# Patient Record
Sex: FEMALE | Race: OTHER | NOT HISPANIC OR LATINO
[De-identification: names, ages, dates, MRNs, and addresses within clinical notes are randomized per-mention and may not be internally consistent; named-entity substitution may affect disease eponyms.]

---

## 2018-03-13 PROBLEM — Z00.00 ENCOUNTER FOR PREVENTIVE HEALTH EXAMINATION: Status: ACTIVE | Noted: 2018-03-13

## 2018-03-22 ENCOUNTER — APPOINTMENT (OUTPATIENT)
Dept: PULMONOLOGY | Facility: CLINIC | Age: 33
End: 2018-03-22
Payer: COMMERCIAL

## 2018-03-22 ENCOUNTER — APPOINTMENT (OUTPATIENT)
Dept: HEART AND VASCULAR | Facility: CLINIC | Age: 33
End: 2018-03-22
Payer: COMMERCIAL

## 2018-03-22 VITALS — SYSTOLIC BLOOD PRESSURE: 158 MMHG | HEART RATE: 102 BPM | DIASTOLIC BLOOD PRESSURE: 100 MMHG

## 2018-03-22 VITALS
BODY MASS INDEX: 46.1 KG/M2 | HEIGHT: 64 IN | WEIGHT: 270 LBS | HEART RATE: 105 BPM | SYSTOLIC BLOOD PRESSURE: 160 MMHG | DIASTOLIC BLOOD PRESSURE: 100 MMHG

## 2018-03-22 DIAGNOSIS — N18.9 CHRONIC KIDNEY DISEASE, UNSPECIFIED: ICD-10-CM

## 2018-03-22 PROCEDURE — 94060 EVALUATION OF WHEEZING: CPT

## 2018-03-22 PROCEDURE — 99205 OFFICE O/P NEW HI 60 MIN: CPT | Mod: 25

## 2018-03-22 PROCEDURE — 93000 ELECTROCARDIOGRAM COMPLETE: CPT

## 2018-03-22 PROCEDURE — 99204 OFFICE O/P NEW MOD 45 MIN: CPT | Mod: 25

## 2018-03-22 PROCEDURE — 36415 COLL VENOUS BLD VENIPUNCTURE: CPT

## 2018-03-22 PROCEDURE — 94729 DIFFUSING CAPACITY: CPT

## 2018-03-22 PROCEDURE — 94727 GAS DIL/WSHOT DETER LNG VOL: CPT

## 2018-03-26 LAB
ALBUMIN SERPL ELPH-MCNC: 3.5 G/DL
ALP BLD-CCNC: 79 U/L
ALT SERPL-CCNC: 18 U/L
ANION GAP SERPL CALC-SCNC: 16 MMOL/L
APPEARANCE: CLEAR
AST SERPL-CCNC: 11 U/L
BACTERIA: NEGATIVE
BASOPHILS # BLD AUTO: 0.01 K/UL
BASOPHILS NFR BLD AUTO: 0.2 %
BILIRUB SERPL-MCNC: <0.2 MG/DL
BILIRUBIN URINE: NEGATIVE
BLOOD URINE: NEGATIVE
BUN SERPL-MCNC: 26 MG/DL
CALCIUM SERPL-MCNC: 8.6 MG/DL
CHLORIDE SERPL-SCNC: 105 MMOL/L
CO2 SERPL-SCNC: 18 MMOL/L
COLOR: YELLOW
CREAT SERPL-MCNC: 2.07 MG/DL
CREAT SPEC-SCNC: 80 MG/DL
CREAT SPEC-SCNC: 80 MG/DL
CREAT/PROT UR: 4.3 RATIO
EOSINOPHIL # BLD AUTO: 0.16 K/UL
EOSINOPHIL NFR BLD AUTO: 2.5 %
GLUCOSE QUALITATIVE U: NEGATIVE MG/DL
GLUCOSE SERPL-MCNC: 84 MG/DL
HCT VFR BLD CALC: 36.2 %
HGB BLD-MCNC: 12.4 G/DL
HYALINE CASTS: 0 /LPF
IMM GRANULOCYTES NFR BLD AUTO: 0.2 %
KETONES URINE: NEGATIVE
LEUKOCYTE ESTERASE URINE: NEGATIVE
LYMPHOCYTES # BLD AUTO: 1.16 K/UL
LYMPHOCYTES NFR BLD AUTO: 18.3 %
MAGNESIUM SERPL-MCNC: 1.9 MG/DL
MAN DIFF?: NORMAL
MCHC RBC-ENTMCNC: 29.3 PG
MCHC RBC-ENTMCNC: 34.3 GM/DL
MCV RBC AUTO: 85.6 FL
MICROALBUMIN 24H UR DL<=1MG/L-MCNC: 215.2 MG/DL
MICROALBUMIN/CREAT 24H UR-RTO: 2690 MG/G
MICROSCOPIC-UA: NORMAL
MONOCYTES # BLD AUTO: 0.63 K/UL
MONOCYTES NFR BLD AUTO: 9.9 %
NEUTROPHILS # BLD AUTO: 4.37 K/UL
NEUTROPHILS NFR BLD AUTO: 68.9 %
NITRITE URINE: NEGATIVE
PH URINE: 6
PLATELET # BLD AUTO: 279 K/UL
POTASSIUM SERPL-SCNC: 4.1 MMOL/L
PROT SERPL-MCNC: 6.6 G/DL
PROT UR-MCNC: 345 MG/DL
PROTEIN URINE: 300 MG/DL
RBC # BLD: 4.23 M/UL
RBC # FLD: 14.2 %
RED BLOOD CELLS URINE: 4 /HPF
SODIUM SERPL-SCNC: 139 MMOL/L
SPECIFIC GRAVITY URINE: 1.01
SQUAMOUS EPITHELIAL CELLS: 6 /HPF
T3FREE SERPL-MCNC: 3.15 PG/ML
T4 FREE SERPL-MCNC: 1.3 NG/DL
TSH SERPL-ACNC: 3.15 UIU/ML
UROBILINOGEN URINE: NEGATIVE MG/DL
WBC # FLD AUTO: 6.34 K/UL
WHITE BLOOD CELLS URINE: 10 /HPF

## 2018-03-27 ENCOUNTER — APPOINTMENT (OUTPATIENT)
Dept: NEPHROLOGY | Facility: CLINIC | Age: 33
End: 2018-03-27
Payer: COMMERCIAL

## 2018-03-27 ENCOUNTER — LABORATORY RESULT (OUTPATIENT)
Age: 33
End: 2018-03-27

## 2018-03-27 VITALS
DIASTOLIC BLOOD PRESSURE: 102 MMHG | OXYGEN SATURATION: 99 % | SYSTOLIC BLOOD PRESSURE: 150 MMHG | HEIGHT: 64 IN | BODY MASS INDEX: 46.1 KG/M2 | RESPIRATION RATE: 12 BRPM | WEIGHT: 270 LBS | HEART RATE: 90 BPM

## 2018-03-27 DIAGNOSIS — E03.9 HYPOTHYROIDISM, UNSPECIFIED: ICD-10-CM

## 2018-03-27 DIAGNOSIS — Z78.9 OTHER SPECIFIED HEALTH STATUS: ICD-10-CM

## 2018-03-27 DIAGNOSIS — Z82.49 FAMILY HISTORY OF ISCHEMIC HEART DISEASE AND OTHER DISEASES OF THE CIRCULATORY SYSTEM: ICD-10-CM

## 2018-03-27 PROCEDURE — 99244 OFF/OP CNSLTJ NEW/EST MOD 40: CPT

## 2018-03-28 LAB
25(OH)D3 SERPL-MCNC: 9 NG/ML
ALBUMIN SERPL ELPH-MCNC: 3.4 G/DL
ALP BLD-CCNC: 73 U/L
ALT SERPL-CCNC: 13 U/L
ANION GAP SERPL CALC-SCNC: 14 MMOL/L
APPEARANCE: CLEAR
APTT BLD: 26.2 SEC
AST SERPL-CCNC: 11 U/L
BACTERIA: NEGATIVE
BASOPHILS # BLD AUTO: 0.01 K/UL
BASOPHILS NFR BLD AUTO: 0.2 %
BILIRUB SERPL-MCNC: <0.2 MG/DL
BILIRUBIN URINE: NEGATIVE
BLOOD URINE: NEGATIVE
BUN SERPL-MCNC: 24 MG/DL
C3 SERPL-MCNC: 129 MG/DL
C4 SERPL-MCNC: 32 MG/DL
CALCIUM SERPL-MCNC: 8.9 MG/DL
CHLORIDE SERPL-SCNC: 106 MMOL/L
CO2 SERPL-SCNC: 19 MMOL/L
COLOR: YELLOW
CREAT SERPL-MCNC: 2.2 MG/DL
CREAT SPEC-SCNC: 74 MG/DL
CREAT/PROT UR: 4.2 RATIO
DSDNA AB SER-ACNC: <12 IU/ML
EOSINOPHIL # BLD AUTO: 0.17 K/UL
EOSINOPHIL NFR BLD AUTO: 2.8 %
GLUCOSE QUALITATIVE U: NEGATIVE MG/DL
GLUCOSE SERPL-MCNC: 87 MG/DL
HBV SURFACE AG SER QL: NONREACTIVE
HCT VFR BLD CALC: 34 %
HCV AB SER QL: NONREACTIVE
HCV S/CO RATIO: 0.06 S/CO
HGB BLD-MCNC: 11.2 G/DL
HYALINE CASTS: 2 /LPF
IGA 24H UR QL IFE: NORMAL
IMM GRANULOCYTES NFR BLD AUTO: 0.2 %
INR PPP: 0.92 RATIO
KETONES URINE: NEGATIVE
LEUKOCYTE ESTERASE URINE: NEGATIVE
LYMPHOCYTES # BLD AUTO: 1.53 K/UL
LYMPHOCYTES NFR BLD AUTO: 24.9 %
M PROTEIN SPEC IFE-MCNC: NORMAL
MAN DIFF?: NORMAL
MCHC RBC-ENTMCNC: 29 PG
MCHC RBC-ENTMCNC: 32.9 GM/DL
MCV RBC AUTO: 88.1 FL
MICROSCOPIC-UA: NORMAL
MONOCYTES # BLD AUTO: 0.4 K/UL
MONOCYTES NFR BLD AUTO: 6.5 %
MPO AB + PR3 PNL SER: NORMAL
NEUTROPHILS # BLD AUTO: 4.02 K/UL
NEUTROPHILS NFR BLD AUTO: 65.4 %
NITRITE URINE: NEGATIVE
PH URINE: 6
PHOSPHATE SERPL-MCNC: 3.2 MG/DL
PLATELET # BLD AUTO: 268 K/UL
POTASSIUM SERPL-SCNC: 4.1 MMOL/L
PROT SERPL-MCNC: 6 G/DL
PROT UR-MCNC: 313 MG/DL
PROTEIN URINE: 300 MG/DL
PT BLD: 10.4 SEC
RBC # BLD: 3.86 M/UL
RBC # FLD: 13.8 %
RED BLOOD CELLS URINE: 5 /HPF
SODIUM SERPL-SCNC: 139 MMOL/L
SPECIFIC GRAVITY URINE: 1.02
SQUAMOUS EPITHELIAL CELLS: 8 /HPF
URATE SERPL-MCNC: 5.8 MG/DL
UROBILINOGEN URINE: NEGATIVE MG/DL
WBC # FLD AUTO: 6.14 K/UL
WHITE BLOOD CELLS URINE: 14 /HPF

## 2018-03-30 LAB
ANA PAT FLD IF-IMP: ABNORMAL
ANA SER IF-ACNC: ABNORMAL

## 2018-04-04 ENCOUNTER — APPOINTMENT (OUTPATIENT)
Dept: NEPHROLOGY | Facility: CLINIC | Age: 33
End: 2018-04-04
Payer: COMMERCIAL

## 2018-04-04 ENCOUNTER — OUTPATIENT (OUTPATIENT)
Dept: OUTPATIENT SERVICES | Facility: HOSPITAL | Age: 33
LOS: 1 days | End: 2018-04-04
Payer: COMMERCIAL

## 2018-04-04 VITALS — SYSTOLIC BLOOD PRESSURE: 142 MMHG | DIASTOLIC BLOOD PRESSURE: 90 MMHG | HEART RATE: 88 BPM

## 2018-04-04 DIAGNOSIS — Z01.818 ENCOUNTER FOR OTHER PREPROCEDURAL EXAMINATION: ICD-10-CM

## 2018-04-04 LAB
BLD GP AB SCN SERPL QL: NEGATIVE — SIGNIFICANT CHANGE UP
PHOSPHOLIPASE A2 RECEPTOR ELISA: <2 RU/ML
PHOSPHOLIPASE A2 RECEPTOR IFA: NEGATIVE
RH IG SCN BLD-IMP: POSITIVE — SIGNIFICANT CHANGE UP

## 2018-04-04 PROCEDURE — 86900 BLOOD TYPING SEROLOGIC ABO: CPT

## 2018-04-04 PROCEDURE — 86901 BLOOD TYPING SEROLOGIC RH(D): CPT

## 2018-04-04 PROCEDURE — 86850 RBC ANTIBODY SCREEN: CPT

## 2018-04-04 PROCEDURE — 99215 OFFICE O/P EST HI 40 MIN: CPT

## 2018-04-05 ENCOUNTER — FORM ENCOUNTER (OUTPATIENT)
Age: 33
End: 2018-04-05

## 2018-04-06 ENCOUNTER — OUTPATIENT (OUTPATIENT)
Dept: OUTPATIENT SERVICES | Facility: HOSPITAL | Age: 33
LOS: 1 days | End: 2018-04-06
Payer: COMMERCIAL

## 2018-04-06 ENCOUNTER — TRANSCRIPTION ENCOUNTER (OUTPATIENT)
Age: 33
End: 2018-04-06

## 2018-04-06 ENCOUNTER — APPOINTMENT (OUTPATIENT)
Dept: ULTRASOUND IMAGING | Facility: HOSPITAL | Age: 33
End: 2018-04-06
Payer: COMMERCIAL

## 2018-04-06 PROCEDURE — 76770 US EXAM ABDO BACK WALL COMP: CPT

## 2018-04-06 PROCEDURE — 76770 US EXAM ABDO BACK WALL COMP: CPT | Mod: 26

## 2018-04-12 ENCOUNTER — RESULT REVIEW (OUTPATIENT)
Age: 33
End: 2018-04-12

## 2018-04-12 ENCOUNTER — OUTPATIENT (OUTPATIENT)
Dept: OUTPATIENT SERVICES | Facility: HOSPITAL | Age: 33
LOS: 1 days | Discharge: HOME | End: 2018-04-12
Payer: COMMERCIAL

## 2018-04-12 VITALS
WEIGHT: 260.15 LBS | DIASTOLIC BLOOD PRESSURE: 77 MMHG | HEART RATE: 102 BPM | TEMPERATURE: 97 F | SYSTOLIC BLOOD PRESSURE: 139 MMHG | RESPIRATION RATE: 16 BRPM | HEIGHT: 64 IN

## 2018-04-12 VITALS — SYSTOLIC BLOOD PRESSURE: 133 MMHG | HEART RATE: 72 BPM | DIASTOLIC BLOOD PRESSURE: 68 MMHG

## 2018-04-12 DIAGNOSIS — Z98.890 OTHER SPECIFIED POSTPROCEDURAL STATES: Chronic | ICD-10-CM

## 2018-04-12 DIAGNOSIS — Z33.2 ENCOUNTER FOR ELECTIVE TERMINATION OF PREGNANCY: Chronic | ICD-10-CM

## 2018-04-12 LAB
BLD GP AB SCN SERPL QL: NEGATIVE — SIGNIFICANT CHANGE UP
HCT VFR BLD CALC: 28.8 % — LOW (ref 34.5–45)
HGB BLD-MCNC: 9.5 G/DL — LOW (ref 11.5–15.5)
MCHC RBC-ENTMCNC: 28.3 PG — SIGNIFICANT CHANGE UP (ref 27–34)
MCHC RBC-ENTMCNC: 33 G/DL — SIGNIFICANT CHANGE UP (ref 32–36)
MCV RBC AUTO: 85.7 FL — SIGNIFICANT CHANGE UP (ref 80–100)
PLATELET # BLD AUTO: 229 K/UL — SIGNIFICANT CHANGE UP (ref 150–400)
RBC # BLD: 3.36 M/UL — LOW (ref 3.8–5.2)
RBC # FLD: 13.7 % — SIGNIFICANT CHANGE UP (ref 10.3–16.9)
RH IG SCN BLD-IMP: POSITIVE — SIGNIFICANT CHANGE UP
WBC # BLD: 11.7 K/UL — HIGH (ref 3.8–10.5)
WBC # FLD AUTO: 11.7 K/UL — HIGH (ref 3.8–10.5)

## 2018-04-12 PROCEDURE — 86850 RBC ANTIBODY SCREEN: CPT

## 2018-04-12 PROCEDURE — 59841 INDUCED ABORTION DILAT&EVAC: CPT

## 2018-04-12 PROCEDURE — 86900 BLOOD TYPING SEROLOGIC ABO: CPT

## 2018-04-12 PROCEDURE — 88305 TISSUE EXAM BY PATHOLOGIST: CPT

## 2018-04-12 PROCEDURE — 86901 BLOOD TYPING SEROLOGIC RH(D): CPT

## 2018-04-12 PROCEDURE — 85027 COMPLETE CBC AUTOMATED: CPT

## 2018-04-12 RX ORDER — OXYCODONE AND ACETAMINOPHEN 5; 325 MG/1; MG/1
2 TABLET ORAL EVERY 6 HOURS
Qty: 0 | Refills: 0 | Status: DISCONTINUED | OUTPATIENT
Start: 2018-04-12 | End: 2018-04-12

## 2018-04-12 RX ORDER — METOCLOPRAMIDE HCL 10 MG
10 TABLET ORAL EVERY 6 HOURS
Qty: 0 | Refills: 0 | Status: DISCONTINUED | OUTPATIENT
Start: 2018-04-12 | End: 2018-04-12

## 2018-04-12 RX ORDER — OXYCODONE AND ACETAMINOPHEN 5; 325 MG/1; MG/1
1 TABLET ORAL EVERY 4 HOURS
Qty: 0 | Refills: 0 | Status: DISCONTINUED | OUTPATIENT
Start: 2018-04-12 | End: 2018-04-12

## 2018-04-12 RX ORDER — ONDANSETRON 8 MG/1
4 TABLET, FILM COATED ORAL ONCE
Qty: 0 | Refills: 0 | Status: DISCONTINUED | OUTPATIENT
Start: 2018-04-12 | End: 2018-04-12

## 2018-04-12 RX ORDER — HYDROMORPHONE HYDROCHLORIDE 2 MG/ML
0.5 INJECTION INTRAMUSCULAR; INTRAVENOUS; SUBCUTANEOUS
Qty: 0 | Refills: 0 | Status: DISCONTINUED | OUTPATIENT
Start: 2018-04-12 | End: 2018-04-12

## 2018-04-12 RX ORDER — SODIUM CHLORIDE 9 MG/ML
1000 INJECTION, SOLUTION INTRAVENOUS
Qty: 0 | Refills: 0 | Status: DISCONTINUED | OUTPATIENT
Start: 2018-04-12 | End: 2018-04-12

## 2018-04-12 NOTE — H&P ADULT - HISTORY OF PRESENT ILLNESS
33 yo  at 13w presents for scheduled termination of pregnancy for maternal medical complications. Patient was newly diagnosed with kidney failure at the time she learned she was pregnant. Patient was referred to nephrology and will continue work up of renal disease after termination, which was recommended by nephrologist.     Obhx: VTOP D&C  C/s in 2016 for chronic HTN, failure to dilate at 37 wks  Gynhx: denies  PMH: HTN, asthma, newly diagnosed renal disease  PSH: C/S, cholecystectomy  Meds: Labetalol 300 BID, synthroid  NKDA  Social: no T/A/D

## 2018-04-12 NOTE — H&P ADULT - ASSESSMENT
33 yo  at 13w by LMP presents for scheduled termination of pregnancy.  -NPO  -IV hydration  -Analgesia prn  -Plan to discharge patient from PACU

## 2018-04-12 NOTE — H&P ADULT - NSHPPHYSICALEXAM_GEN_ALL_CORE
Vital Signs Last 24 Hrs  T(C): 36.2 (12 Apr 2018 09:21), Max: 36.2 (12 Apr 2018 09:21)  T(F): 97.2 (12 Apr 2018 09:21), Max: 97.2 (12 Apr 2018 09:21)  HR: 102 (12 Apr 2018 09:21) (102 - 102)  BP: 139/77 (12 Apr 2018 09:21) (139/77 - 139/77)  BP(mean): --  RR: 16 (12 Apr 2018 09:21) (16 - 16)  SpO2: --    Gen: NAD, AOx3  Chest no distress  Extremities: WWP, no edema

## 2018-04-12 NOTE — PACU DISCHARGE NOTE - COMMENTS
Discharge instructions given to patient who verbalized understanding. Denies pain, nausea and vomiting, scant bleeding noted on peripad after patient ambulated for 5 minutes. VSS, cleared by Anesthesiologist for discharge home.

## 2018-04-18 LAB — SURGICAL PATHOLOGY STUDY: SIGNIFICANT CHANGE UP

## 2018-04-19 ENCOUNTER — APPOINTMENT (OUTPATIENT)
Dept: NEPHROLOGY | Facility: CLINIC | Age: 33
End: 2018-04-19
Payer: COMMERCIAL

## 2018-04-19 ENCOUNTER — APPOINTMENT (OUTPATIENT)
Dept: PULMONOLOGY | Facility: CLINIC | Age: 33
End: 2018-04-19

## 2018-04-19 VITALS
WEIGHT: 260 LBS | SYSTOLIC BLOOD PRESSURE: 138 MMHG | DIASTOLIC BLOOD PRESSURE: 92 MMHG | HEART RATE: 84 BPM | BODY MASS INDEX: 44.63 KG/M2

## 2018-04-19 DIAGNOSIS — O16.9 UNSPECIFIED MATERNAL HYPERTENSION, UNSPECIFIED TRIMESTER: ICD-10-CM

## 2018-04-19 PROBLEM — I10 ESSENTIAL (PRIMARY) HYPERTENSION: Chronic | Status: ACTIVE | Noted: 2018-04-11

## 2018-04-19 PROCEDURE — 99214 OFFICE O/P EST MOD 30 MIN: CPT

## 2018-04-23 LAB
25(OH)D3 SERPL-MCNC: 8.9 NG/ML
ALBUMIN SERPL ELPH-MCNC: 2.9 G/DL
ALBUMIN SERPL ELPH-MCNC: 3.6 G/DL
ALP BLD-CCNC: 66 U/L
ALP BLD-CCNC: 71 U/L
ALT SERPL-CCNC: 15 U/L
ALT SERPL-CCNC: 20 U/L
ANION GAP SERPL CALC-SCNC: 13 MMOL/L
ANION GAP SERPL CALC-SCNC: 13 MMOL/L
APPEARANCE: ABNORMAL
APPEARANCE: CLEAR
APTT BLD: 27 SEC
AST SERPL-CCNC: 11 U/L
AST SERPL-CCNC: 14 U/L
BACTERIA: ABNORMAL
BACTERIA: NEGATIVE
BASOPHILS # BLD AUTO: 0.01 K/UL
BASOPHILS NFR BLD AUTO: 0.2 %
BILIRUB SERPL-MCNC: 0.2 MG/DL
BILIRUB SERPL-MCNC: <0.2 MG/DL
BILIRUBIN URINE: NEGATIVE
BILIRUBIN URINE: NEGATIVE
BLOOD URINE: ABNORMAL
BLOOD URINE: NEGATIVE
BUN SERPL-MCNC: 21 MG/DL
BUN SERPL-MCNC: 26 MG/DL
CALCIUM SERPL-MCNC: 8.5 MG/DL
CALCIUM SERPL-MCNC: 8.9 MG/DL
CHLORIDE SERPL-SCNC: 106 MMOL/L
CHLORIDE SERPL-SCNC: 106 MMOL/L
CO2 SERPL-SCNC: 20 MMOL/L
CO2 SERPL-SCNC: 21 MMOL/L
COLOR: YELLOW
COLOR: YELLOW
CREAT SERPL-MCNC: 1.86 MG/DL
CREAT SERPL-MCNC: 2.36 MG/DL
CREAT SPEC-SCNC: 77 MG/DL
CREAT SPEC-SCNC: 82 MG/DL
CREAT/PROT UR: 2.6 RATIO
CREAT/PROT UR: 3.4 RATIO
EOSINOPHIL # BLD AUTO: 0.36 K/UL
EOSINOPHIL NFR BLD AUTO: 6.8 %
GLUCOSE QUALITATIVE U: NEGATIVE MG/DL
GLUCOSE QUALITATIVE U: NEGATIVE MG/DL
GLUCOSE SERPL-MCNC: 81 MG/DL
GLUCOSE SERPL-MCNC: 95 MG/DL
HCT VFR BLD CALC: 27.8 %
HGB BLD-MCNC: 9.1 G/DL
HYALINE CASTS: 0 /LPF
HYALINE CASTS: 0 /LPF
IMM GRANULOCYTES NFR BLD AUTO: 0.2 %
INR PPP: 0.92 RATIO
KETONES URINE: NEGATIVE
KETONES URINE: NEGATIVE
LEUKOCYTE ESTERASE URINE: ABNORMAL
LEUKOCYTE ESTERASE URINE: NEGATIVE
LYMPHOCYTES # BLD AUTO: 1.07 K/UL
LYMPHOCYTES NFR BLD AUTO: 20.2 %
MAN DIFF?: NORMAL
MCHC RBC-ENTMCNC: 28.9 PG
MCHC RBC-ENTMCNC: 32.7 GM/DL
MCV RBC AUTO: 88.3 FL
MICROSCOPIC-UA: NORMAL
MICROSCOPIC-UA: NORMAL
MONOCYTES # BLD AUTO: 0.34 K/UL
MONOCYTES NFR BLD AUTO: 6.4 %
NEUTROPHILS # BLD AUTO: 3.52 K/UL
NEUTROPHILS NFR BLD AUTO: 66.2 %
NITRITE URINE: NEGATIVE
NITRITE URINE: NEGATIVE
PH URINE: 6
PH URINE: 6.5
PHOSPHATE SERPL-MCNC: 3.1 MG/DL
PLATELET # BLD AUTO: 301 K/UL
POTASSIUM SERPL-SCNC: 4.3 MMOL/L
POTASSIUM SERPL-SCNC: 4.9 MMOL/L
PROT SERPL-MCNC: 5.7 G/DL
PROT SERPL-MCNC: 6.1 G/DL
PROT UR-MCNC: 202 MG/DL
PROT UR-MCNC: 280 MG/DL
PROTEIN URINE: 300 MG/DL
PROTEIN URINE: 300 MG/DL
PT BLD: 10.4 SEC
RBC # BLD: 3.15 M/UL
RBC # FLD: 15.1 %
RED BLOOD CELLS URINE: 10 /HPF
RED BLOOD CELLS URINE: 2 /HPF
SODIUM SERPL-SCNC: 139 MMOL/L
SODIUM SERPL-SCNC: 140 MMOL/L
SPECIFIC GRAVITY URINE: 1.01
SPECIFIC GRAVITY URINE: 1.01
SQUAMOUS EPITHELIAL CELLS: 10 /HPF
SQUAMOUS EPITHELIAL CELLS: 7 /HPF
URATE SERPL-MCNC: 5.7 MG/DL
UROBILINOGEN URINE: NEGATIVE MG/DL
UROBILINOGEN URINE: NEGATIVE MG/DL
WBC # FLD AUTO: 5.31 K/UL
WHITE BLOOD CELLS URINE: 107 /HPF
WHITE BLOOD CELLS URINE: 14 /HPF

## 2018-04-24 ENCOUNTER — OTHER (OUTPATIENT)
Age: 33
End: 2018-04-24

## 2018-04-30 ENCOUNTER — FORM ENCOUNTER (OUTPATIENT)
Age: 33
End: 2018-04-30

## 2018-05-01 ENCOUNTER — OUTPATIENT (OUTPATIENT)
Dept: OUTPATIENT SERVICES | Facility: HOSPITAL | Age: 33
LOS: 1 days | End: 2018-05-01
Payer: COMMERCIAL

## 2018-05-01 ENCOUNTER — RESULT REVIEW (OUTPATIENT)
Age: 33
End: 2018-05-01

## 2018-05-01 ENCOUNTER — APPOINTMENT (OUTPATIENT)
Dept: CT IMAGING | Facility: HOSPITAL | Age: 33
End: 2018-05-01
Payer: COMMERCIAL

## 2018-05-01 ENCOUNTER — APPOINTMENT (OUTPATIENT)
Dept: INTERVENTIONAL RADIOLOGY/VASCULAR | Facility: HOSPITAL | Age: 33
End: 2018-05-01
Payer: COMMERCIAL

## 2018-05-01 DIAGNOSIS — Z98.890 OTHER SPECIFIED POSTPROCEDURAL STATES: Chronic | ICD-10-CM

## 2018-05-01 DIAGNOSIS — Z33.2 ENCOUNTER FOR ELECTIVE TERMINATION OF PREGNANCY: Chronic | ICD-10-CM

## 2018-05-01 DIAGNOSIS — D30.02 BENIGN NEOPLASM OF LEFT KIDNEY: ICD-10-CM

## 2018-05-01 DIAGNOSIS — N28.9 DISORDER OF KIDNEY AND URETER, UNSPECIFIED: ICD-10-CM

## 2018-05-01 LAB
ALBUMIN SERPL ELPH-MCNC: 4 G/DL
ALP BLD-CCNC: 82 U/L
ALT SERPL-CCNC: 25 U/L
ANION GAP SERPL CALC-SCNC: 15 MMOL/L
APPEARANCE: ABNORMAL
AST SERPL-CCNC: 16 U/L
BACTERIA: ABNORMAL
BILIRUB SERPL-MCNC: 0.2 MG/DL
BILIRUBIN URINE: NEGATIVE
BLOOD URINE: NEGATIVE
BUN SERPL-MCNC: 22 MG/DL
CALCIUM SERPL-MCNC: 8.7 MG/DL
CHLORIDE SERPL-SCNC: 108 MMOL/L
CO2 SERPL-SCNC: 19 MMOL/L
COLOR: YELLOW
CREAT SERPL-MCNC: 2.57 MG/DL
CREAT SPEC-SCNC: 127 MG/DL
CREAT/PROT UR: 2.2 RATIO
GLUCOSE QUALITATIVE U: NEGATIVE MG/DL
GLUCOSE SERPL-MCNC: 89 MG/DL
HYALINE CASTS: 5 /LPF
KETONES URINE: NEGATIVE
LEUKOCYTE ESTERASE URINE: ABNORMAL
MICROSCOPIC-UA: NORMAL
NITRITE URINE: NEGATIVE
PH URINE: 5.5
POTASSIUM SERPL-SCNC: 4.3 MMOL/L
PROT SERPL-MCNC: 6.6 G/DL
PROT UR-MCNC: 280 MG/DL
PROTEIN URINE: 300 MG/DL
RED BLOOD CELLS URINE: 0 /HPF
SODIUM SERPL-SCNC: 142 MMOL/L
SPECIFIC GRAVITY URINE: 1.01
SQUAMOUS EPITHELIAL CELLS: 6 /HPF
UROBILINOGEN URINE: NEGATIVE MG/DL
WHITE BLOOD CELLS URINE: 72 /HPF

## 2018-05-01 PROCEDURE — 88313 SPECIAL STAINS GROUP 2: CPT

## 2018-05-01 PROCEDURE — 88348 ELECTRON MICROSCOPY DX: CPT

## 2018-05-01 PROCEDURE — 76942 ECHO GUIDE FOR BIOPSY: CPT | Mod: 26

## 2018-05-01 PROCEDURE — 88346 IMFLUOR 1ST 1ANTB STAIN PX: CPT | Mod: 26

## 2018-05-01 PROCEDURE — 99153 MOD SED SAME PHYS/QHP EA: CPT

## 2018-05-01 PROCEDURE — 99152 MOD SED SAME PHYS/QHP 5/>YRS: CPT

## 2018-05-01 PROCEDURE — 76942 ECHO GUIDE FOR BIOPSY: CPT

## 2018-05-01 PROCEDURE — 88346 IMFLUOR 1ST 1ANTB STAIN PX: CPT

## 2018-05-01 PROCEDURE — 88312 SPECIAL STAINS GROUP 1: CPT | Mod: 26

## 2018-05-01 PROCEDURE — 88350 IMFLUOR EA ADDL 1ANTB STN PX: CPT | Mod: 26

## 2018-05-01 PROCEDURE — 50200 RENAL BIOPSY PERQ: CPT | Mod: LT

## 2018-05-01 PROCEDURE — 88305 TISSUE EXAM BY PATHOLOGIST: CPT | Mod: 26

## 2018-05-01 PROCEDURE — 50200 RENAL BIOPSY PERQ: CPT

## 2018-05-01 PROCEDURE — 88313 SPECIAL STAINS GROUP 2: CPT | Mod: 26

## 2018-05-01 PROCEDURE — 88350 IMFLUOR EA ADDL 1ANTB STN PX: CPT

## 2018-05-01 PROCEDURE — 88305 TISSUE EXAM BY PATHOLOGIST: CPT

## 2018-05-01 PROCEDURE — 88312 SPECIAL STAINS GROUP 1: CPT

## 2018-05-01 PROCEDURE — 88348 ELECTRON MICROSCOPY DX: CPT | Mod: 26

## 2018-05-18 ENCOUNTER — APPOINTMENT (OUTPATIENT)
Dept: NEPHROLOGY | Facility: CLINIC | Age: 33
End: 2018-05-18
Payer: COMMERCIAL

## 2018-05-18 VITALS
WEIGHT: 260 LBS | BODY MASS INDEX: 44.63 KG/M2 | HEART RATE: 84 BPM | SYSTOLIC BLOOD PRESSURE: 134 MMHG | DIASTOLIC BLOOD PRESSURE: 78 MMHG

## 2018-05-18 DIAGNOSIS — O10.919 UNSPECIFIED PRE-EXISTING HYPERTENSION COMPLICATING PREGNANCY, UNSPECIFIED TRIMESTER: ICD-10-CM

## 2018-05-18 PROCEDURE — 99215 OFFICE O/P EST HI 40 MIN: CPT

## 2018-05-18 RX ORDER — LABETALOL HYDROCHLORIDE 200 MG/1
200 TABLET, FILM COATED ORAL
Qty: 180 | Refills: 3 | Status: DISCONTINUED | COMMUNITY
Start: 2018-03-22 | End: 2018-05-18

## 2018-06-25 ENCOUNTER — APPOINTMENT (OUTPATIENT)
Dept: NEPHROLOGY | Facility: CLINIC | Age: 33
End: 2018-06-25

## 2018-10-17 ENCOUNTER — APPOINTMENT (OUTPATIENT)
Dept: BARIATRICS | Facility: CLINIC | Age: 33
End: 2018-10-17
Payer: COMMERCIAL

## 2018-10-17 VITALS
SYSTOLIC BLOOD PRESSURE: 142 MMHG | WEIGHT: 253 LBS | DIASTOLIC BLOOD PRESSURE: 85 MMHG | BODY MASS INDEX: 43.19 KG/M2 | TEMPERATURE: 98.3 F | HEART RATE: 96 BPM | HEIGHT: 64 IN | OXYGEN SATURATION: 97 %

## 2018-10-17 DIAGNOSIS — J45.909 UNSPECIFIED ASTHMA, UNCOMPLICATED: ICD-10-CM

## 2018-10-17 PROCEDURE — 99203 OFFICE O/P NEW LOW 30 MIN: CPT

## 2018-10-24 ENCOUNTER — APPOINTMENT (OUTPATIENT)
Dept: BARIATRICS | Facility: CLINIC | Age: 33
End: 2018-10-24

## 2018-10-29 ENCOUNTER — APPOINTMENT (OUTPATIENT)
Dept: BARIATRICS | Facility: CLINIC | Age: 33
End: 2018-10-29

## 2019-01-08 ENCOUNTER — APPOINTMENT (OUTPATIENT)
Dept: NEPHROLOGY | Facility: CLINIC | Age: 34
End: 2019-01-08
Payer: COMMERCIAL

## 2019-01-08 VITALS
WEIGHT: 259 LBS | SYSTOLIC BLOOD PRESSURE: 140 MMHG | DIASTOLIC BLOOD PRESSURE: 108 MMHG | HEART RATE: 92 BPM | BODY MASS INDEX: 44.46 KG/M2

## 2019-01-08 LAB
APPEARANCE: CLEAR
BACTERIA: NEGATIVE
BASOPHILS # BLD AUTO: 0.03 K/UL
BASOPHILS NFR BLD AUTO: 0.5 %
BILIRUBIN URINE: NEGATIVE
BLOOD URINE: NEGATIVE
COLOR: YELLOW
EOSINOPHIL # BLD AUTO: 0.57 K/UL
EOSINOPHIL NFR BLD AUTO: 10.1 %
GLUCOSE QUALITATIVE U: NEGATIVE MG/DL
HCT VFR BLD CALC: 38 %
HGB BLD-MCNC: 12.3 G/DL
HYALINE CASTS: 1 /LPF
IMM GRANULOCYTES NFR BLD AUTO: 0 %
KETONES URINE: NEGATIVE
LEUKOCYTE ESTERASE URINE: NEGATIVE
LYMPHOCYTES # BLD AUTO: 1.45 K/UL
LYMPHOCYTES NFR BLD AUTO: 25.7 %
MAN DIFF?: NORMAL
MCHC RBC-ENTMCNC: 28.8 PG
MCHC RBC-ENTMCNC: 32.4 GM/DL
MCV RBC AUTO: 89 FL
MICROSCOPIC-UA: NORMAL
MONOCYTES # BLD AUTO: 0.37 K/UL
MONOCYTES NFR BLD AUTO: 6.6 %
NEUTROPHILS # BLD AUTO: 3.22 K/UL
NEUTROPHILS NFR BLD AUTO: 57.1 %
NITRITE URINE: NEGATIVE
PH URINE: 6
PLATELET # BLD AUTO: 299 K/UL
PROTEIN URINE: 100 MG/DL
RBC # BLD: 4.27 M/UL
RBC # FLD: 14.1 %
RED BLOOD CELLS URINE: 3 /HPF
SPECIFIC GRAVITY URINE: 1.02
SQUAMOUS EPITHELIAL CELLS: 5 /HPF
UROBILINOGEN URINE: NEGATIVE MG/DL
WBC # FLD AUTO: 5.64 K/UL
WHITE BLOOD CELLS URINE: 3 /HPF

## 2019-01-08 PROCEDURE — 99214 OFFICE O/P EST MOD 30 MIN: CPT

## 2019-01-09 NOTE — PHYSICAL EXAM
[General Appearance - Alert] : alert [General Appearance - In No Acute Distress] : in no acute distress [Sclera] : the sclera and conjunctiva were normal [Jugular Venous Distention Increased] : there was no jugular-venous distention [Auscultation Breath Sounds / Voice Sounds] : lungs were clear to auscultation bilaterally [Heart Rate And Rhythm] : heart rate was normal and rhythm regular [Heart Sounds Gallop] : no gallops [Murmurs] : no murmurs [Heart Sounds Pericardial Friction Rub] : no pericardial rub [Edema] : there was no peripheral edema [Abdomen Soft] : soft [Abdomen Tenderness] : non-tender [No CVA Tenderness] : no ~M costovertebral angle tenderness [Involuntary Movements] : no involuntary movements were seen [] : no rash [No Focal Deficits] : no focal deficits [Oriented To Time, Place, And Person] : oriented to person, place, and time [Affect] : the affect was normal [FreeTextEntry1] : obese

## 2019-01-09 NOTE — ASSESSMENT
[FreeTextEntry1] : CKD 4  with severe obesity, HTN and secondary FSGS-- stable renal fxn as of Oct \par lytes acceptable \par BP still seems high though reports much better at home\par obesity - evaluated for bariatric surgery\par will check labs today -- eso chem, phos, PTH, vit D, urine protein/cr\par advised follow home BP -- and will touch base - consider add ca channel blocker - or increase ACEI if proteinuria increased and K and cr stable\par refill vit D based on level\par weight loss encouraged - aree with plan for bariatric sx \par f/u 1-2 mos-- emphasized importance close f/u \par \par

## 2019-01-09 NOTE — HISTORY OF PRESENT ILLNESS
[FreeTextEntry1] : f/u CKD, FSGS, HTN\par has been well \par had recent episode gross hematuria with some dysuria (few days ago) -- hasn’t recurred but has had frequency for a while\par BP at home has been good when chekcs -- 120/70s\par taking meds \par no labs since\par got evaluation for bariatric surgery\par PCP Dr West - at Dr Burrell

## 2019-01-10 ENCOUNTER — RX RENEWAL (OUTPATIENT)
Age: 34
End: 2019-01-10

## 2019-01-21 LAB
25(OH)D3 SERPL-MCNC: 22.2 NG/ML
ALBUMIN SERPL ELPH-MCNC: 4 G/DL
ALP BLD-CCNC: 78 U/L
ALT SERPL-CCNC: 20 U/L
ANION GAP SERPL CALC-SCNC: 11 MMOL/L
AST SERPL-CCNC: 13 U/L
BACTERIA UR CULT: NORMAL
BILIRUB SERPL-MCNC: 0.2 MG/DL
BUN SERPL-MCNC: 29 MG/DL
CALCIUM SERPL-MCNC: 9 MG/DL
CALCIUM SERPL-MCNC: 9 MG/DL
CHLORIDE SERPL-SCNC: 110 MMOL/L
CO2 SERPL-SCNC: 20 MMOL/L
CREAT SERPL-MCNC: 2.16 MG/DL
CREAT SPEC-SCNC: 93 MG/DL
CREAT/PROT UR: 1 RATIO
GLUCOSE SERPL-MCNC: 98 MG/DL
MAGNESIUM SERPL-MCNC: 2 MG/DL
PARATHYROID HORMONE INTACT: 185 PG/ML
PHOSPHATE SERPL-MCNC: 3.6 MG/DL
POTASSIUM SERPL-SCNC: 4.7 MMOL/L
PROT SERPL-MCNC: 6.9 G/DL
PROT UR-MCNC: 89 MG/DL
SODIUM SERPL-SCNC: 141 MMOL/L
URATE SERPL-MCNC: 7.3 MG/DL

## 2019-03-29 VITALS — WEIGHT: 251.25 LBS | BODY MASS INDEX: 42.89 KG/M2 | HEIGHT: 64 IN

## 2019-04-10 ENCOUNTER — RX RENEWAL (OUTPATIENT)
Age: 34
End: 2019-04-10

## 2019-04-11 ENCOUNTER — TRANSCRIPTION ENCOUNTER (OUTPATIENT)
Age: 34
End: 2019-04-11

## 2019-05-01 VITALS — HEIGHT: 64 IN | WEIGHT: 256 LBS | BODY MASS INDEX: 43.71 KG/M2

## 2019-05-23 VITALS — HEIGHT: 64 IN | WEIGHT: 256.5 LBS | BODY MASS INDEX: 43.79 KG/M2

## 2019-06-04 ENCOUNTER — APPOINTMENT (OUTPATIENT)
Dept: NEPHROLOGY | Facility: CLINIC | Age: 34
End: 2019-06-04

## 2019-06-27 VITALS — WEIGHT: 251.25 LBS | HEIGHT: 64 IN | BODY MASS INDEX: 42.89 KG/M2

## 2019-09-13 ENCOUNTER — APPOINTMENT (OUTPATIENT)
Dept: NEPHROLOGY | Facility: CLINIC | Age: 34
End: 2019-09-13
Payer: COMMERCIAL

## 2019-09-13 VITALS
SYSTOLIC BLOOD PRESSURE: 144 MMHG | BODY MASS INDEX: 44.97 KG/M2 | WEIGHT: 262 LBS | DIASTOLIC BLOOD PRESSURE: 98 MMHG | HEART RATE: 96 BPM

## 2019-09-13 DIAGNOSIS — E66.9 OBESITY, UNSPECIFIED: ICD-10-CM

## 2019-09-13 DIAGNOSIS — N17.9 ACUTE KIDNEY FAILURE, UNSPECIFIED: ICD-10-CM

## 2019-09-13 PROCEDURE — 99214 OFFICE O/P EST MOD 30 MIN: CPT

## 2019-09-13 RX ORDER — ERGOCALCIFEROL 1.25 MG/1
1.25 MG CAPSULE, LIQUID FILLED ORAL
Qty: 4 | Refills: 3 | Status: DISCONTINUED | COMMUNITY
Start: 2018-04-19 | End: 2019-09-13

## 2019-09-13 RX ORDER — PNV/FERROUS SULFATE/FOLIC ACID 27-<0.5MG
TABLET ORAL
Refills: 0 | Status: DISCONTINUED | COMMUNITY
End: 2019-09-13

## 2019-09-13 RX ORDER — BUDESONIDE AND FORMOTEROL FUMARATE DIHYDRATE 80; 4.5 UG/1; UG/1
80-4.5 AEROSOL RESPIRATORY (INHALATION)
Refills: 0 | Status: ACTIVE | COMMUNITY
Start: 2019-09-13

## 2019-09-13 RX ORDER — LABETALOL HYDROCHLORIDE 300 MG/1
300 TABLET, FILM COATED ORAL
Qty: 60 | Refills: 5 | Status: DISCONTINUED | COMMUNITY
Start: 2018-04-04 | End: 2019-09-13

## 2019-09-13 RX ORDER — LEVOTHYROXINE SODIUM 0.03 MG/1
25 TABLET ORAL DAILY
Refills: 0 | Status: DISCONTINUED | COMMUNITY
Start: 2018-03-16 | End: 2019-09-13

## 2019-09-13 NOTE — HISTORY OF PRESENT ILLNESS
[FreeTextEntry1] : f/u CKD, HTN, FSGS\par last here 1/2019\par BP meds changed by PCP since then because BP was high -- didn’t seem to help much but now much better BP since started new job! previous job was very stressful\par BP was abt 110 systolic at last visit with PCP she thinks  but generally about 130-140 systolic \par feels well - energy good\par no swelling\par scheduled for bariatric surgery this month (9/23) with Dr Childress-- sleeve gastrectomy \par initially lost weight but gained it back \par PCP Dr Hayes \par

## 2019-09-13 NOTE — PHYSICAL EXAM
[General Appearance - Alert] : alert [General Appearance - In No Acute Distress] : in no acute distress [Sclera] : the sclera and conjunctiva were normal [Jugular Venous Distention Increased] : there was no jugular-venous distention [Tachycardic ___] : the heart rate was tachycardic at [unfilled] bpm [Heart Sounds Gallop] : no gallops [Regular] : the rhythm was regular [Edema] : there was no peripheral edema [Abdomen Soft] : soft [Abdomen Tenderness] : non-tender [No CVA Tenderness] : no ~M costovertebral angle tenderness [Involuntary Movements] : no involuntary movements were seen [] : no rash [No Focal Deficits] : no focal deficits [Oriented To Time, Place, And Person] : oriented to person, place, and time [FreeTextEntry1] : obese

## 2019-09-13 NOTE — ASSESSMENT
[FreeTextEntry1] : CKD 3/4 with FSGS -- likely obesity and /or HTN related\par creat slightly up from baseline \par BP seems may be suboptimally controlled still - though reports has been better generally , tachycardia\par morbid obesity\par \par will try restart beta blocker - says tolerated labetalol well previously but willl use toprol XL with tachycardia and also less likely to interact with asthma\par check labs today - consider lower ACEI if creat higher (or hyperkalemia) \par overall if f/u chem stable seems stable from renal viewpoint for OR \par hopefully, significant weight loss will aid BP and renal parameters \par

## 2019-09-17 ENCOUNTER — FORM ENCOUNTER (OUTPATIENT)
Age: 34
End: 2019-09-17

## 2019-09-17 LAB
25(OH)D3 SERPL-MCNC: 14 NG/ML
ALBUMIN SERPL ELPH-MCNC: 4 G/DL
ALP BLD-CCNC: 81 U/L
ALT SERPL-CCNC: 18 U/L
ANION GAP SERPL CALC-SCNC: 11 MMOL/L
APPEARANCE: CLEAR
AST SERPL-CCNC: 15 U/L
BACTERIA: NEGATIVE
BILIRUB SERPL-MCNC: <0.2 MG/DL
BILIRUBIN URINE: NEGATIVE
BLOOD URINE: NEGATIVE
BUN SERPL-MCNC: 26 MG/DL
CALCIUM SERPL-MCNC: 9.1 MG/DL
CALCIUM SERPL-MCNC: 9.1 MG/DL
CHLORIDE SERPL-SCNC: 111 MMOL/L
CO2 SERPL-SCNC: 22 MMOL/L
COLOR: NORMAL
CREAT SERPL-MCNC: 2.98 MG/DL
CREAT SPEC-SCNC: 81 MG/DL
CREAT/PROT UR: 0.9 RATIO
GLUCOSE QUALITATIVE U: NEGATIVE
GLUCOSE SERPL-MCNC: 87 MG/DL
HYALINE CASTS: 0 /LPF
KETONES URINE: NEGATIVE
LEUKOCYTE ESTERASE URINE: NEGATIVE
MAGNESIUM SERPL-MCNC: 2 MG/DL
MICROSCOPIC-UA: NORMAL
NITRITE URINE: NEGATIVE
PARATHYROID HORMONE INTACT: 221 PG/ML
PH URINE: 6.5
PHOSPHATE SERPL-MCNC: 3 MG/DL
POTASSIUM SERPL-SCNC: 5.4 MMOL/L
PROT SERPL-MCNC: 6.6 G/DL
PROT UR-MCNC: 73 MG/DL
PROTEIN URINE: ABNORMAL
RED BLOOD CELLS URINE: 2 /HPF
SODIUM SERPL-SCNC: 143 MMOL/L
SPECIFIC GRAVITY URINE: 1.01
SQUAMOUS EPITHELIAL CELLS: 2 /HPF
URATE SERPL-MCNC: 7.4 MG/DL
UROBILINOGEN URINE: NORMAL
WHITE BLOOD CELLS URINE: 1 /HPF

## 2019-09-18 ENCOUNTER — APPOINTMENT (OUTPATIENT)
Dept: BARIATRICS | Facility: CLINIC | Age: 34
End: 2019-09-18
Payer: COMMERCIAL

## 2019-09-18 ENCOUNTER — APPOINTMENT (OUTPATIENT)
Dept: RADIOLOGY | Facility: HOSPITAL | Age: 34
End: 2019-09-18
Payer: COMMERCIAL

## 2019-09-18 ENCOUNTER — OUTPATIENT (OUTPATIENT)
Dept: OUTPATIENT SERVICES | Facility: HOSPITAL | Age: 34
LOS: 1 days | End: 2019-09-18
Payer: COMMERCIAL

## 2019-09-18 VITALS
BODY MASS INDEX: 44.41 KG/M2 | SYSTOLIC BLOOD PRESSURE: 139 MMHG | DIASTOLIC BLOOD PRESSURE: 95 MMHG | TEMPERATURE: 98.6 F | WEIGHT: 260.13 LBS | HEIGHT: 64 IN | HEART RATE: 109 BPM | OXYGEN SATURATION: 97 %

## 2019-09-18 DIAGNOSIS — Z33.2 ENCOUNTER FOR ELECTIVE TERMINATION OF PREGNANCY: Chronic | ICD-10-CM

## 2019-09-18 DIAGNOSIS — Z98.890 OTHER SPECIFIED POSTPROCEDURAL STATES: Chronic | ICD-10-CM

## 2019-09-18 PROCEDURE — 74241: CPT | Mod: 26

## 2019-09-18 PROCEDURE — 74241: CPT

## 2019-09-18 PROCEDURE — 99024 POSTOP FOLLOW-UP VISIT: CPT

## 2019-09-20 ENCOUNTER — OTHER (OUTPATIENT)
Age: 34
End: 2019-09-20

## 2019-09-20 VITALS
OXYGEN SATURATION: 98 % | RESPIRATION RATE: 20 BRPM | DIASTOLIC BLOOD PRESSURE: 86 MMHG | SYSTOLIC BLOOD PRESSURE: 152 MMHG | HEIGHT: 64 IN | TEMPERATURE: 99 F | WEIGHT: 262.13 LBS | HEART RATE: 98 BPM

## 2019-09-20 RX ORDER — INFLUENZA VIRUS VACCINE 15; 15; 15; 15 UG/.5ML; UG/.5ML; UG/.5ML; UG/.5ML
0.5 SUSPENSION INTRAMUSCULAR ONCE
Refills: 0 | Status: DISCONTINUED | OUTPATIENT
Start: 2019-09-23 | End: 2019-09-25

## 2019-09-20 RX ORDER — AMLODIPINE BESYLATE 2.5 MG/1
1 TABLET ORAL
Qty: 0 | Refills: 0 | DISCHARGE

## 2019-09-20 RX ORDER — LABETALOL HCL 100 MG
1 TABLET ORAL
Qty: 0 | Refills: 0 | DISCHARGE

## 2019-09-20 RX ORDER — OMEPRAZOLE 10 MG/1
1 CAPSULE, DELAYED RELEASE ORAL
Qty: 0 | Refills: 0 | DISCHARGE

## 2019-09-23 ENCOUNTER — RESULT REVIEW (OUTPATIENT)
Age: 34
End: 2019-09-23

## 2019-09-23 ENCOUNTER — INPATIENT (INPATIENT)
Facility: HOSPITAL | Age: 34
LOS: 1 days | Discharge: HOME | DRG: 620 | End: 2019-09-25
Attending: SURGERY | Admitting: SURGERY
Payer: COMMERCIAL

## 2019-09-23 ENCOUNTER — APPOINTMENT (OUTPATIENT)
Dept: BARIATRICS | Facility: HOSPITAL | Age: 34
End: 2019-09-23

## 2019-09-23 DIAGNOSIS — Z98.890 OTHER SPECIFIED POSTPROCEDURAL STATES: Chronic | ICD-10-CM

## 2019-09-23 DIAGNOSIS — Z98.891 HISTORY OF UTERINE SCAR FROM PREVIOUS SURGERY: Chronic | ICD-10-CM

## 2019-09-23 PROCEDURE — 43775 LAP SLEEVE GASTRECTOMY: CPT | Mod: GC

## 2019-09-23 RX ORDER — ONDANSETRON 8 MG/1
4 TABLET, FILM COATED ORAL ONCE
Refills: 0 | Status: DISCONTINUED | OUTPATIENT
Start: 2019-09-23 | End: 2019-09-25

## 2019-09-23 RX ORDER — ERGOCALCIFEROL 1.25 MG/1
1 CAPSULE ORAL
Qty: 0 | Refills: 0 | DISCHARGE

## 2019-09-23 RX ORDER — HYDROMORPHONE HYDROCHLORIDE 2 MG/ML
0.5 INJECTION INTRAMUSCULAR; INTRAVENOUS; SUBCUTANEOUS EVERY 6 HOURS
Refills: 0 | Status: DISCONTINUED | OUTPATIENT
Start: 2019-09-23 | End: 2019-09-24

## 2019-09-23 RX ORDER — ALBUTEROL 90 UG/1
2 AEROSOL, METERED ORAL
Refills: 0 | Status: DISCONTINUED | OUTPATIENT
Start: 2019-09-23 | End: 2019-09-25

## 2019-09-23 RX ORDER — PANTOPRAZOLE SODIUM 20 MG/1
40 TABLET, DELAYED RELEASE ORAL DAILY
Refills: 0 | Status: DISCONTINUED | OUTPATIENT
Start: 2019-09-23 | End: 2019-09-25

## 2019-09-23 RX ORDER — BUDESONIDE AND FORMOTEROL FUMARATE DIHYDRATE 160; 4.5 UG/1; UG/1
2 AEROSOL RESPIRATORY (INHALATION)
Refills: 0 | Status: DISCONTINUED | OUTPATIENT
Start: 2019-09-23 | End: 2019-09-25

## 2019-09-23 RX ORDER — HEPARIN SODIUM 5000 [USP'U]/ML
5000 INJECTION INTRAVENOUS; SUBCUTANEOUS ONCE
Refills: 0 | Status: COMPLETED | OUTPATIENT
Start: 2019-09-23 | End: 2019-09-23

## 2019-09-23 RX ORDER — ACETAMINOPHEN 500 MG
1000 TABLET ORAL ONCE
Refills: 0 | Status: COMPLETED | OUTPATIENT
Start: 2019-09-23 | End: 2019-09-23

## 2019-09-23 RX ORDER — GABAPENTIN 400 MG/1
300 CAPSULE ORAL ONCE
Refills: 0 | Status: COMPLETED | OUTPATIENT
Start: 2019-09-23 | End: 2019-09-23

## 2019-09-23 RX ORDER — HEPARIN SODIUM 5000 [USP'U]/ML
7500 INJECTION INTRAVENOUS; SUBCUTANEOUS EVERY 8 HOURS
Refills: 0 | Status: DISCONTINUED | OUTPATIENT
Start: 2019-09-24 | End: 2019-09-25

## 2019-09-23 RX ORDER — SCOPALAMINE 1 MG/3D
1 PATCH, EXTENDED RELEASE TRANSDERMAL ONCE
Refills: 0 | Status: COMPLETED | OUTPATIENT
Start: 2019-09-23 | End: 2019-09-23

## 2019-09-23 RX ORDER — HYDROMORPHONE HYDROCHLORIDE 2 MG/ML
0.5 INJECTION INTRAMUSCULAR; INTRAVENOUS; SUBCUTANEOUS ONCE
Refills: 0 | Status: DISCONTINUED | OUTPATIENT
Start: 2019-09-23 | End: 2019-09-24

## 2019-09-23 RX ORDER — AMLODIPINE BESYLATE 2.5 MG/1
1 TABLET ORAL
Qty: 0 | Refills: 0 | DISCHARGE

## 2019-09-23 RX ORDER — METOPROLOL TARTRATE 50 MG
25 TABLET ORAL DAILY
Refills: 0 | Status: DISCONTINUED | OUTPATIENT
Start: 2019-09-24 | End: 2019-09-24

## 2019-09-23 RX ORDER — APREPITANT 80 MG/1
40 CAPSULE ORAL ONCE
Refills: 0 | Status: COMPLETED | OUTPATIENT
Start: 2019-09-23 | End: 2019-09-23

## 2019-09-23 RX ORDER — SODIUM CHLORIDE 9 MG/ML
1000 INJECTION, SOLUTION INTRAVENOUS
Refills: 0 | Status: DISCONTINUED | OUTPATIENT
Start: 2019-09-23 | End: 2019-09-24

## 2019-09-23 RX ORDER — ACETAMINOPHEN 500 MG
975 TABLET ORAL ONCE
Refills: 0 | Status: DISCONTINUED | OUTPATIENT
Start: 2019-09-23 | End: 2019-09-23

## 2019-09-23 RX ADMIN — HYDROMORPHONE HYDROCHLORIDE 0.5 MILLIGRAM(S): 2 INJECTION INTRAMUSCULAR; INTRAVENOUS; SUBCUTANEOUS at 21:16

## 2019-09-23 RX ADMIN — Medication 1000 MILLIGRAM(S): at 13:34

## 2019-09-23 RX ADMIN — HYDROMORPHONE HYDROCHLORIDE 0.5 MILLIGRAM(S): 2 INJECTION INTRAMUSCULAR; INTRAVENOUS; SUBCUTANEOUS at 21:55

## 2019-09-23 RX ADMIN — PANTOPRAZOLE SODIUM 40 MILLIGRAM(S): 20 TABLET, DELAYED RELEASE ORAL at 21:09

## 2019-09-23 RX ADMIN — GABAPENTIN 300 MILLIGRAM(S): 400 CAPSULE ORAL at 13:35

## 2019-09-23 RX ADMIN — SCOPALAMINE 1 PATCH: 1 PATCH, EXTENDED RELEASE TRANSDERMAL at 21:25

## 2019-09-23 RX ADMIN — SCOPALAMINE 1 PATCH: 1 PATCH, EXTENDED RELEASE TRANSDERMAL at 13:35

## 2019-09-23 RX ADMIN — APREPITANT 40 MILLIGRAM(S): 80 CAPSULE ORAL at 13:38

## 2019-09-23 RX ADMIN — HEPARIN SODIUM 5000 UNIT(S): 5000 INJECTION INTRAVENOUS; SUBCUTANEOUS at 13:35

## 2019-09-23 RX ADMIN — SODIUM CHLORIDE 150 MILLILITER(S): 9 INJECTION, SOLUTION INTRAVENOUS at 21:24

## 2019-09-23 NOTE — H&P ADULT - ASSESSMENT
Pt is a 34 y/o F w/ PMH of HTN, asthma, stage 4 kidney disease, HTN nephropathy (Cr. 2.92) presents today for robotic assisted lap sleeve gastrectomy.   - OR  - admit to team 2

## 2019-09-23 NOTE — BRIEF OPERATIVE NOTE - OPERATION/FINDINGS
Intrabadominal access via Veress needle. Trochars placed under direct visualization. Omentectomy, Stomach divided along 38Fr Bougie edge using 6 staple loads (45mm x 1, 60mm x 5). Omentopexy. Hemostasis achieved. Fascia at 12mm post site closed. Port sites x 4 closed w/ 4-0 Monocryl sutures & surgical glue.

## 2019-09-23 NOTE — H&P ADULT - HISTORY OF PRESENT ILLNESS
Pt is a 34 y/o F w/ PMH of HTN, asthma, stage 4 kidney disease, HTN nephropathy (Cr. 2.92) presents today for robotic assisted lap sleeve gastrectomy.

## 2019-09-23 NOTE — H&P ADULT - NSHPPHYSICALEXAM_GEN_ALL_CORE
Neuro: Alert and Oriented X 4  Respiratory: CTA b/l. no respiratory distress  Cardiovascular: NSR, RRR  Gastrointestinal: soft, NT/ND  Extremities: (-) edema b/l

## 2019-09-23 NOTE — PROGRESS NOTE ADULT - SUBJECTIVE AND OBJECTIVE BOX
POST-OPERATIVE NOTE    Procedure: Robot-assisted sleeve gastrectomy     Diagnosis/Indication: morbid obesity    Surgeon: Dr. Childress    S: Pt has no complaints. Denies CP, SOB, PIMENTEL, calf tenderness. Pain controlled with medication. Denies nausea, vomiting.    O:  T(C): 37 (09-23-19 @ 20:31), Max: 37 (09-23-19 @ 20:31)  T(F): 98.6 (09-23-19 @ 20:31), Max: 98.6 (09-23-19 @ 20:31)  HR: 100 (09-23-19 @ 22:46) (90 - 100)  BP: 153/81 (09-23-19 @ 22:46) (132/79 - 155/81)  RR: 18 (09-23-19 @ 22:46) (14 - 21)  SpO2: 98% (09-23-19 @ 22:46) (97% - 100%)  Wt(kg): --    Gen: NAD, resting comfortably in bed  C/V: NSR  Pulm: Nonlabored breathing, no respiratory distress  Abd: soft, NT/ND, incisional areas are clean, dry and intact  Extrem: WWP, no calf edema or tenderness, SCDs in place    A/P: 34y Female s/p above procedure  Diet: BCLD  IVF: LR @ 150cc/hr  Pain/nausea control  SQH/SCDs/OOBA/IS  Dispo pending pain control, PO tolerance, clinical improvement

## 2019-09-24 PROBLEM — E66.01 MORBID (SEVERE) OBESITY DUE TO EXCESS CALORIES: Chronic | Status: ACTIVE | Noted: 2019-09-20

## 2019-09-24 PROBLEM — N28.9 DISORDER OF KIDNEY AND URETER, UNSPECIFIED: Chronic | Status: ACTIVE | Noted: 2019-09-20

## 2019-09-24 LAB
ANION GAP SERPL CALC-SCNC: 13 MMOL/L — SIGNIFICANT CHANGE UP (ref 5–17)
BUN SERPL-MCNC: 32 MG/DL — HIGH (ref 7–23)
CALCIUM SERPL-MCNC: 9.2 MG/DL — SIGNIFICANT CHANGE UP (ref 8.4–10.5)
CHLORIDE SERPL-SCNC: 110 MMOL/L — HIGH (ref 96–108)
CO2 SERPL-SCNC: 17 MMOL/L — LOW (ref 22–31)
CREAT SERPL-MCNC: 3.13 MG/DL — HIGH (ref 0.5–1.3)
GLUCOSE SERPL-MCNC: 120 MG/DL — HIGH (ref 70–99)
HCT VFR BLD CALC: 34.7 % — SIGNIFICANT CHANGE UP (ref 34.5–45)
HCT VFR BLD CALC: 36.5 % — SIGNIFICANT CHANGE UP (ref 34.5–45)
HGB BLD-MCNC: 11.4 G/DL — LOW (ref 11.5–15.5)
HGB BLD-MCNC: 11.8 G/DL — SIGNIFICANT CHANGE UP (ref 11.5–15.5)
MAGNESIUM SERPL-MCNC: 1.8 MG/DL — SIGNIFICANT CHANGE UP (ref 1.6–2.6)
MCHC RBC-ENTMCNC: 29.4 PG — SIGNIFICANT CHANGE UP (ref 27–34)
MCHC RBC-ENTMCNC: 29.5 PG — SIGNIFICANT CHANGE UP (ref 27–34)
MCHC RBC-ENTMCNC: 32.3 GM/DL — SIGNIFICANT CHANGE UP (ref 32–36)
MCHC RBC-ENTMCNC: 32.9 GM/DL — SIGNIFICANT CHANGE UP (ref 32–36)
MCV RBC AUTO: 89.7 FL — SIGNIFICANT CHANGE UP (ref 80–100)
MCV RBC AUTO: 90.8 FL — SIGNIFICANT CHANGE UP (ref 80–100)
NRBC # BLD: 0 /100 WBCS — SIGNIFICANT CHANGE UP (ref 0–0)
NRBC # BLD: 0 /100 WBCS — SIGNIFICANT CHANGE UP (ref 0–0)
PHOSPHATE SERPL-MCNC: 3.7 MG/DL — SIGNIFICANT CHANGE UP (ref 2.5–4.5)
PLATELET # BLD AUTO: 225 K/UL — SIGNIFICANT CHANGE UP (ref 150–400)
PLATELET # BLD AUTO: 242 K/UL — SIGNIFICANT CHANGE UP (ref 150–400)
POTASSIUM SERPL-MCNC: 4.9 MMOL/L — SIGNIFICANT CHANGE UP (ref 3.5–5.3)
POTASSIUM SERPL-SCNC: 4.9 MMOL/L — SIGNIFICANT CHANGE UP (ref 3.5–5.3)
RBC # BLD: 3.87 M/UL — SIGNIFICANT CHANGE UP (ref 3.8–5.2)
RBC # BLD: 4.02 M/UL — SIGNIFICANT CHANGE UP (ref 3.8–5.2)
RBC # FLD: 13.2 % — SIGNIFICANT CHANGE UP (ref 10.3–14.5)
RBC # FLD: 13.2 % — SIGNIFICANT CHANGE UP (ref 10.3–14.5)
SODIUM SERPL-SCNC: 140 MMOL/L — SIGNIFICANT CHANGE UP (ref 135–145)
WBC # BLD: 7.84 K/UL — SIGNIFICANT CHANGE UP (ref 3.8–10.5)
WBC # BLD: 7.99 K/UL — SIGNIFICANT CHANGE UP (ref 3.8–10.5)
WBC # FLD AUTO: 7.84 K/UL — SIGNIFICANT CHANGE UP (ref 3.8–10.5)
WBC # FLD AUTO: 7.99 K/UL — SIGNIFICANT CHANGE UP (ref 3.8–10.5)

## 2019-09-24 PROCEDURE — 99222 1ST HOSP IP/OBS MODERATE 55: CPT

## 2019-09-24 RX ORDER — SODIUM CHLORIDE 9 MG/ML
1000 INJECTION, SOLUTION INTRAVENOUS
Refills: 0 | Status: DISCONTINUED | OUTPATIENT
Start: 2019-09-24 | End: 2019-09-24

## 2019-09-24 RX ORDER — AMLODIPINE BESYLATE 2.5 MG/1
10 TABLET ORAL DAILY
Refills: 0 | Status: DISCONTINUED | OUTPATIENT
Start: 2019-09-24 | End: 2019-09-25

## 2019-09-24 RX ORDER — SODIUM CHLORIDE 9 MG/ML
1000 INJECTION, SOLUTION INTRAVENOUS
Refills: 0 | Status: DISCONTINUED | OUTPATIENT
Start: 2019-09-24 | End: 2019-09-25

## 2019-09-24 RX ORDER — METOPROLOL TARTRATE 50 MG
25 TABLET ORAL DAILY
Refills: 0 | Status: DISCONTINUED | OUTPATIENT
Start: 2019-09-24 | End: 2019-09-25

## 2019-09-24 RX ORDER — ACETAMINOPHEN 500 MG
1000 TABLET ORAL ONCE
Refills: 0 | Status: COMPLETED | OUTPATIENT
Start: 2019-09-24 | End: 2019-09-24

## 2019-09-24 RX ADMIN — Medication 400 MILLIGRAM(S): at 11:50

## 2019-09-24 RX ADMIN — HYDROMORPHONE HYDROCHLORIDE 0.5 MILLIGRAM(S): 2 INJECTION INTRAMUSCULAR; INTRAVENOUS; SUBCUTANEOUS at 05:35

## 2019-09-24 RX ADMIN — Medication 1000 MILLIGRAM(S): at 12:35

## 2019-09-24 RX ADMIN — HEPARIN SODIUM 7500 UNIT(S): 5000 INJECTION INTRAVENOUS; SUBCUTANEOUS at 05:13

## 2019-09-24 RX ADMIN — SCOPALAMINE 1 PATCH: 1 PATCH, EXTENDED RELEASE TRANSDERMAL at 06:26

## 2019-09-24 RX ADMIN — BUDESONIDE AND FORMOTEROL FUMARATE DIHYDRATE 2 PUFF(S): 160; 4.5 AEROSOL RESPIRATORY (INHALATION) at 07:20

## 2019-09-24 RX ADMIN — HYDROMORPHONE HYDROCHLORIDE 0.5 MILLIGRAM(S): 2 INJECTION INTRAMUSCULAR; INTRAVENOUS; SUBCUTANEOUS at 01:05

## 2019-09-24 RX ADMIN — HEPARIN SODIUM 7500 UNIT(S): 5000 INJECTION INTRAVENOUS; SUBCUTANEOUS at 20:42

## 2019-09-24 RX ADMIN — BUDESONIDE AND FORMOTEROL FUMARATE DIHYDRATE 2 PUFF(S): 160; 4.5 AEROSOL RESPIRATORY (INHALATION) at 17:32

## 2019-09-24 RX ADMIN — Medication 25 MILLIGRAM(S): at 07:20

## 2019-09-24 RX ADMIN — HEPARIN SODIUM 7500 UNIT(S): 5000 INJECTION INTRAVENOUS; SUBCUTANEOUS at 13:22

## 2019-09-24 RX ADMIN — SCOPALAMINE 1 PATCH: 1 PATCH, EXTENDED RELEASE TRANSDERMAL at 17:33

## 2019-09-24 RX ADMIN — PANTOPRAZOLE SODIUM 40 MILLIGRAM(S): 20 TABLET, DELAYED RELEASE ORAL at 11:50

## 2019-09-24 RX ADMIN — SODIUM CHLORIDE 100 MILLILITER(S): 9 INJECTION, SOLUTION INTRAVENOUS at 08:55

## 2019-09-24 RX ADMIN — AMLODIPINE BESYLATE 10 MILLIGRAM(S): 2.5 TABLET ORAL at 15:49

## 2019-09-24 RX ADMIN — SODIUM CHLORIDE 50 MILLILITER(S): 9 INJECTION, SOLUTION INTRAVENOUS at 17:32

## 2019-09-24 RX ADMIN — HYDROMORPHONE HYDROCHLORIDE 0.5 MILLIGRAM(S): 2 INJECTION INTRAMUSCULAR; INTRAVENOUS; SUBCUTANEOUS at 05:14

## 2019-09-24 NOTE — PROGRESS NOTE ADULT - SUBJECTIVE AND OBJECTIVE BOX
SUBJECTIVE: Patient seen & examined at bedside by chief resident. Patient is doing well this morning. Pain is minimal and under control with current regimen. Tolerating bCLD with no N/V. No flatus, no BM. Denies experiencing any palpitations, chest pain, dyspnea or light-headedness. Has not been out of bed but will try to ambulate this AM. Voiding w/o issues.    Vital Signs Last 24 Hrs  T(C): 36.9 (24 Sep 2019 05:02), Max: 37.3 (24 Sep 2019 01:56)  T(F): 98.4 (24 Sep 2019 05:02), Max: 99.2 (24 Sep 2019 01:56)  HR: 115 (24 Sep 2019 05:02) (90 - 115)  BP: 145/93 (24 Sep 2019 05:02) (132/79 - 156/77)  BP(mean): 113 (24 Sep 2019 00:46) (105 - 117)  RR: 17 (24 Sep 2019 05:02) (14 - 21)  SpO2: 94% (24 Sep 2019 05:02) (94% - 100%)    Gen: NAD  C/V: NSR  Pulm: Nonlabored breathing, no respiratory distress  Abd: soft, ATTP, ND, incision C/D/I.   Extrem: Nonedematous, SCD in place    I&O's Detail    23 Sep 2019 07:01  -  24 Sep 2019 07:00  --------------------------------------------------------  IN:    lactated ringers.: 750 mL  Total IN: 750 mL    OUT:    Voided: 350 mL  Total OUT: 350 mL    Total NET: 400 mL

## 2019-09-24 NOTE — CONSULT NOTE ADULT - SUBJECTIVE AND OBJECTIVE BOX
Patient is a 34y Female hx obesity, HTN, CKD due to secondary FSGS , baseline creatinine mid 2s-- recently increase to high 2s and ACEI lowered. now d#1 post op sleeve gastrectomy. No complaints except mild abd pain post op    PAST MEDICAL & SURGICAL HISTORY:  Nephropathy  Morbid obesity  HTN (hypertension)  Asthma  H/O:  section  History of cholecystectomy      MEDICATIONS  (STANDING):  buDESOnide  80 MICROgram(s)/formoterol 4.5 MICROgram(s) Inhaler 2 Puff(s) Inhalation two times a day  heparin  Injectable 7500 Unit(s) SubCutaneous every 8 hours  influenza   Vaccine 0.5 milliLiter(s) IntraMuscular once  lactated ringers. 1000 milliLiter(s) (100 mL/Hr) IV Continuous <Continuous>  metoprolol succinate ER 25 milliGRAM(s) Oral daily  pantoprazole  Injectable 40 milliGRAM(s) IV Push daily    MEDICATIONS  (PRN):  ALBUTerol    90 MICROgram(s) HFA Inhaler 2 Puff(s) Inhalation four times a day PRN Wheezing  HYDROmorphone  Injectable 0.5 milliGRAM(s) IV Push every 6 hours PRN Severe Pain (7 - 10)  ondansetron Injectable 4 milliGRAM(s) IV Push once PRN Nausea and/or Vomiting      Allergies    No Known Allergies    Intolerances        SOCIAL HISTORY: no tobacco, no drugs    FAMILY HISTORY: NC      T(C): , Max: 37.3 (19 @ 01:56)  T(F): , Max: 99.2 (19 @ 01:56)  HR: 102 (19 @ 08:53)  BP: 138/91 (19 @ 08:53)  BP(mean): 113 (19 @ 00:46)  RR: 18 (19 @ 08:53)  SpO2: 99% (19 @ 08:53)  Wt(kg): --     @ 07:01  -   @ 07:00  --------------------------------------------------------  IN: 750 mL / OUT: 350 mL / NET: 400 mL    09-24 @ 07: @ 12:50  --------------------------------------------------------  IN: 360 mL / OUT: 200 mL / NET: 160 mL            PHYSICAL EXAM:  Constitutional: Well appearing.  No acute distress  ENMT: Moist mucous membrane.  No cyanosis.  Neck: No JVD.  Respiratory: Clear to auscultation   Cardiovascular: S1, S2.  Regular rate and rhythm.    Gastrointestinal: soft, mild tenderness  Extremities: Warm.  No lower extremity edema.    Neurological: No focal deficits.  Skin: Warm. Dry.  .    Psychiatric: Normal affect.    ACCESS:       LABS:                        11.8   7.99  )-----------( 242      ( 24 Sep 2019 08:38 )             36.5         140  |  110<H>  |  32<H>  ----------------------------<  120<H>  4.9   |  17<L>  |  3.13<H>    Ca    9.2      24 Sep 2019 08:38  Phos  3.7       Mg     1.8                       RADIOLOGY & ADDITIONAL STUDIES:

## 2019-09-24 NOTE — DIETITIAN INITIAL EVALUATION ADULT. - OTHER INFO
34 F PMH of HTN, asthma, CKD Stage 4 not on HD (baseline Cr 2.92), PSH lap cholecystectomy, now s/p elective robotic assisted lap sleeve gastrectomy on 9/23. Pt seen resting in bed. Currently on a BARICLLIQ diet and tolerating. Pt denies N/V. +pain at incisional site. Pt prepared with protein shakes and vitamin supplements. RD provided in depth diet education on diet advancement and specific nutrient needs s/p LSG. Pt appeared receptive and expressed understanding. NKFA or dietary restrictions. Skin: Archie score 18. Pt endorses burping, but no BM or flatus since surgery. RD to monitor and f/u per protocol.

## 2019-09-24 NOTE — CONSULT NOTE ADULT - ASSESSMENT
CKD due to FSGS (secondary to obesity)-- appears to have had some progresssion recently  would dc ACEI   cont toprol CKD due to FSGS (secondary to obesity)-- appears to have had some progresssion recently  would dc ACEI   cont toprol   should also be on amlodipine 10 mg/d  can dc IVF when eating   follow chem daily

## 2019-09-24 NOTE — DIETITIAN INITIAL EVALUATION ADULT. - ENERGY NEEDS
Ht (9/23): 162.6cm, Wt (9/23): 118.9kg, IBW: 54.5kg +/-10%, %IBW: 218%, BMI: 45   IBW used to calculate energy needs due to pt's current body weight exceeding 120% of IBW.  Above energy needs calculated for wt maintenance (20-25kcal/kg).   Weeks 1-2 estimated needs: 545-654 kcal/day (10-12kcal/kg), 65-82 g pro/day (1.2-1.5g/kg), >/=64oz clear fluids.

## 2019-09-25 ENCOUNTER — TRANSCRIPTION ENCOUNTER (OUTPATIENT)
Age: 34
End: 2019-09-25

## 2019-09-25 VITALS
HEART RATE: 76 BPM | RESPIRATION RATE: 18 BRPM | SYSTOLIC BLOOD PRESSURE: 132 MMHG | TEMPERATURE: 98 F | DIASTOLIC BLOOD PRESSURE: 82 MMHG | OXYGEN SATURATION: 99 %

## 2019-09-25 LAB
ANION GAP SERPL CALC-SCNC: 12 MMOL/L — SIGNIFICANT CHANGE UP (ref 5–17)
BUN SERPL-MCNC: 29 MG/DL — HIGH (ref 7–23)
CALCIUM SERPL-MCNC: 8.6 MG/DL — SIGNIFICANT CHANGE UP (ref 8.4–10.5)
CHLORIDE SERPL-SCNC: 109 MMOL/L — HIGH (ref 96–108)
CO2 SERPL-SCNC: 18 MMOL/L — LOW (ref 22–31)
CREAT SERPL-MCNC: 3.11 MG/DL — HIGH (ref 0.5–1.3)
GLUCOSE SERPL-MCNC: 84 MG/DL — SIGNIFICANT CHANGE UP (ref 70–99)
HCT VFR BLD CALC: 31.8 % — LOW (ref 34.5–45)
HGB BLD-MCNC: 10.3 G/DL — LOW (ref 11.5–15.5)
MAGNESIUM SERPL-MCNC: 1.7 MG/DL — SIGNIFICANT CHANGE UP (ref 1.6–2.6)
MCHC RBC-ENTMCNC: 29.5 PG — SIGNIFICANT CHANGE UP (ref 27–34)
MCHC RBC-ENTMCNC: 32.4 GM/DL — SIGNIFICANT CHANGE UP (ref 32–36)
MCV RBC AUTO: 91.1 FL — SIGNIFICANT CHANGE UP (ref 80–100)
NRBC # BLD: 0 /100 WBCS — SIGNIFICANT CHANGE UP (ref 0–0)
PHOSPHATE SERPL-MCNC: 3.1 MG/DL — SIGNIFICANT CHANGE UP (ref 2.5–4.5)
PLATELET # BLD AUTO: 193 K/UL — SIGNIFICANT CHANGE UP (ref 150–400)
POTASSIUM SERPL-MCNC: 4 MMOL/L — SIGNIFICANT CHANGE UP (ref 3.5–5.3)
POTASSIUM SERPL-SCNC: 4 MMOL/L — SIGNIFICANT CHANGE UP (ref 3.5–5.3)
RBC # BLD: 3.49 M/UL — LOW (ref 3.8–5.2)
RBC # FLD: 13.2 % — SIGNIFICANT CHANGE UP (ref 10.3–14.5)
SODIUM SERPL-SCNC: 139 MMOL/L — SIGNIFICANT CHANGE UP (ref 135–145)
WBC # BLD: 9.08 K/UL — SIGNIFICANT CHANGE UP (ref 3.8–10.5)
WBC # FLD AUTO: 9.08 K/UL — SIGNIFICANT CHANGE UP (ref 3.8–10.5)

## 2019-09-25 PROCEDURE — 99231 SBSQ HOSP IP/OBS SF/LOW 25: CPT | Mod: GC

## 2019-09-25 RX ORDER — OXYCODONE AND ACETAMINOPHEN 5; 325 MG/1; MG/1
1 TABLET ORAL
Qty: 16 | Refills: 0
Start: 2019-09-25 | End: 2019-09-28

## 2019-09-25 RX ORDER — HYDROMORPHONE HYDROCHLORIDE 2 MG/ML
0.5 INJECTION INTRAMUSCULAR; INTRAVENOUS; SUBCUTANEOUS EVERY 6 HOURS
Refills: 0 | Status: DISCONTINUED | OUTPATIENT
Start: 2019-09-25 | End: 2019-09-25

## 2019-09-25 RX ORDER — MAGNESIUM SULFATE 500 MG/ML
2 VIAL (ML) INJECTION ONCE
Refills: 0 | Status: COMPLETED | OUTPATIENT
Start: 2019-09-25 | End: 2019-09-25

## 2019-09-25 RX ORDER — OMEPRAZOLE 10 MG/1
1 CAPSULE, DELAYED RELEASE ORAL
Qty: 30 | Refills: 0
Start: 2019-09-25 | End: 2019-10-24

## 2019-09-25 RX ORDER — LISINOPRIL 2.5 MG/1
1 TABLET ORAL
Qty: 0 | Refills: 0 | DISCHARGE

## 2019-09-25 RX ORDER — ACETAMINOPHEN 500 MG
650 TABLET ORAL EVERY 6 HOURS
Refills: 0 | Status: DISCONTINUED | OUTPATIENT
Start: 2019-09-25 | End: 2019-09-25

## 2019-09-25 RX ADMIN — BUDESONIDE AND FORMOTEROL FUMARATE DIHYDRATE 2 PUFF(S): 160; 4.5 AEROSOL RESPIRATORY (INHALATION) at 05:17

## 2019-09-25 RX ADMIN — Medication 25 MILLIGRAM(S): at 05:17

## 2019-09-25 RX ADMIN — HEPARIN SODIUM 7500 UNIT(S): 5000 INJECTION INTRAVENOUS; SUBCUTANEOUS at 14:37

## 2019-09-25 RX ADMIN — SCOPALAMINE 1 PATCH: 1 PATCH, EXTENDED RELEASE TRANSDERMAL at 06:30

## 2019-09-25 RX ADMIN — PANTOPRAZOLE SODIUM 40 MILLIGRAM(S): 20 TABLET, DELAYED RELEASE ORAL at 14:38

## 2019-09-25 RX ADMIN — Medication 650 MILLIGRAM(S): at 06:39

## 2019-09-25 RX ADMIN — Medication 650 MILLIGRAM(S): at 05:42

## 2019-09-25 RX ADMIN — Medication 50 GRAM(S): at 09:57

## 2019-09-25 RX ADMIN — AMLODIPINE BESYLATE 10 MILLIGRAM(S): 2.5 TABLET ORAL at 05:17

## 2019-09-25 RX ADMIN — HEPARIN SODIUM 7500 UNIT(S): 5000 INJECTION INTRAVENOUS; SUBCUTANEOUS at 05:17

## 2019-09-25 NOTE — DISCHARGE NOTE PROVIDER - CARE PROVIDER_API CALL
Israel Childress (MD)  Surgery  [address]  [place], [place] [address]  Phone: [phone]  Fax: [phone]  Follow Up Time: 1 week

## 2019-09-25 NOTE — DISCHARGE NOTE PROVIDER - NSDCFUSCHEDAPPT_GEN_ALL_CORE_FT
OCTAVIA VALDIVIA ; 10/03/2019 ; [facility]PP Surg Bariatric [address]  OCTAVIA VALDIVIA ; 12/09/2019 ; NPP Nephro [address]

## 2019-09-25 NOTE — PROGRESS NOTE ADULT - SUBJECTIVE AND OBJECTIVE BOX
OVERNIGHT EVENTS:  pain controlled, tolerating  BCLD, encouraged pt to ambulate and use IS, VSS   9/24: Dr. Neves called Cr 3.13 rec restart Norvasc 10 mg and Toprol xl , do not restart ace inhibitor lisinopril when tolerating BCLD, minimal PO intake , TACHY 102, UO     STATUS POST:  Robotic assisted laparoscopic sleeve gastrectomy     POST OPERATIVE DAY #: 2    SUBJECTIVE:  Patient examined bedside with chief resident. Patient reports pain improved and tolerating diet. Patient denies nausea, vomiting, chest pain and SOB. Patient making adequate urine output.      amLODIPine   Tablet 10 milliGRAM(s) Oral daily  heparin  Injectable 7500 Unit(s) SubCutaneous every 8 hours  metoprolol succinate ER 25 milliGRAM(s) Oral daily      Vital Signs Last 24 Hrs  T(C): 36.9 (25 Sep 2019 08:48), Max: 36.9 (24 Sep 2019 17:29)  T(F): 98.5 (25 Sep 2019 08:48), Max: 98.5 (25 Sep 2019 08:48)  HR: 88 (25 Sep 2019 08:48) (66 - 89)  BP: 129/86 (25 Sep 2019 08:48) (129/86 - 148/104)  BP(mean): --  RR: 15 (25 Sep 2019 08:48) (15 - 17)  SpO2: 96% (25 Sep 2019 08:48) (96% - 100%)  I&O's Detail    24 Sep 2019 07:01  -  25 Sep 2019 07:00  --------------------------------------------------------  IN:    lactated ringers.: 800 mL    lactated ringers.: 650 mL    Oral Fluid: 1320 mL  Total IN: 2770 mL    OUT:    Voided: 2450 mL  Total OUT: 2450 mL    Total NET: 320 mL          General: NAD, resting comfortably in bed  C/V: NSR  Pulm: Nonlabored breathing, no respiratory distress  Abd: Soft , non-distended , TTP around incision site , incision clean dry and intact   Extrem: WWP, no edema, SCDs in place        LABS:                        10.3   9.08  )-----------( 193      ( 25 Sep 2019 06:52 )             31.8     09-25    139  |  109<H>  |  29<H>  ----------------------------<  84  4.0   |  18<L>  |  3.11<H>    Ca    8.6      25 Sep 2019 06:52  Phos  3.1     09-25  Mg     1.7     09-25            RADIOLOGY & ADDITIONAL STUDIES:

## 2019-09-25 NOTE — PROGRESS NOTE ADULT - ASSESSMENT
34 F PMH of HTN, asthma, CKD Stage 4 not on HD (baseline Cr 2.92), PSH lap cholecystectomy s/p elective robotic assisted lap sleeve gastrectomy (9/23)    Pain/nausea control  BCLD, IVF  Protonix  HSQ DVT ppx  SCDs, OOBA, IS  AM labs
34 F PMH of HTN, asthma, CKD Stage 4 not on HD (baseline Cr 2.92), PSH lap cholecystectomy s/p elective robotic assisted lap sleeve gastrectomy POD1    Pain/nausea control  BCLD, IVF  Protonix  SQH  SLY Stone, IS  AM labs  Nutritional consult in AM
CKD seems may have progressed but stable past week  BP acceptable control  keep off lisinopril for now   ok to dc from renal viewpoint with outpt f/u of chem

## 2019-09-25 NOTE — DISCHARGE NOTE NURSING/CASE MANAGEMENT/SOCIAL WORK - PATIENT PORTAL LINK FT
You can access the FollowMyHealth Patient Portal offered by [facility] [place] [facility] by registering at the following website: [email or web address]. By joining [employer]’s FollowMyHealth portal, you will also be able to view your health information using other applications (apps) compatible with our system.

## 2019-09-25 NOTE — DISCHARGE NOTE PROVIDER - HOSPITAL COURSE
34 F PMH of HTN, asthma, CKD Stage 4 not on HD (baseline Cr 2.92), PSH lap paloma and , presents today elective bariatric diet. : robotic assisted lap sleeve gastrectomy. Pt tolerated the procedure well. Patient outpatient nephrologist consulted for Cr. At time of discharge, pt was tolerating a bariatric clear liquid diet, and pt's pain was controlled. Plan is to follow up with Dr. Childress in the office.

## 2019-09-25 NOTE — DISCHARGE NOTE PROVIDER - NSDCCPGOAL_GEN_ALL_CORE_FT
To get better and follow your care plan as instructed.    1) Please take Percocet 1 to 2 tabs by mouth every 4 to 6 hours as needed for severe pain control.  2) Please take Tylenol 650 mg every 4 to 6 hours by mouth for moderate pain control.   3) Please take Omeprazole 40 mg once a day by mouth.  4) Please make an appointment to follow up with Nephrologist .  5) Please continue all hypertensive medications accept  Ace Inhibitor Lisinopril .

## 2019-09-25 NOTE — DISCHARGE NOTE PROVIDER - NSDCCPCAREPLAN_GEN_ALL_CORE_FT
PRINCIPAL DISCHARGE DIAGNOSIS  Diagnosis: Morbid obesity  Assessment and Plan of Treatment: 34 F PMH of HTN, asthma, CKD Stage 4 not on HD (baseline Cr 2.92), PSH lap paloma and , presents today elective bariatric diet. : robotic assisted lap sleeve gastrectomy. Pt tolerated the procedure well.  1) Please take Percocet 1 to 2 tabs by mouth every 4 to 6 hours as needed for severe pain control.  2) Please take Tylenol 650 mg every 4 to 6 hours by mouth for moderate pain control.   3) Please take Omeprazole 40 mg once a day by mouth.

## 2019-09-25 NOTE — DISCHARGE NOTE PROVIDER - NSDCACTIVITY_GEN_ALL_CORE
Walking - Indoors allowed/Showering allowed/No heavy lifting/straining/Walking - Outdoors allowed/Stairs allowed

## 2019-09-25 NOTE — DISCHARGE NOTE PROVIDER - NSDCFUADDINST_GEN_ALL_CORE_FT
1) Please take Percocet 1 to 2 tabs by mouth every 4 to 6 hours as needed for severe pain control.  2) Please take Tylenol 650 mg every 4 to 6 hours by mouth for moderate pain control.   3) Please take Omeprazole 40 mg once a day by mouth.  4) Please make an appointment to follow up with Nephrologist .  5) Please continue all hypertensive medications accept  Ace Inhibitor Lisinopril .    Follow up with  in 1 week. Call the office at  to schedule your appointment.  You may shower; soap and water over incision sites. Do not scrub. Pat dry when done. No tub bathing or swimming until cleared. Keep incision sites out of the sun as scars will darken. No heavy lifting (>10lbs) or strenuous exercise. Diet: Bariatric Full Fluids. 60 grams protein daily.  64 fluid ounces water daily. Drink small sips throughout the day. Continue diet as outlined by paperwork received as a pre-operative patient. You should be urinating at least 3-4x per day. Call the office if you experience increasing abdominal pain, nausea, vomiting, or temperature >100.4F.  NO ASPIRIN OR NSAIDs until approved by Dr. Childress. Avoid alcoholic beverages until cleared by Dr. Childress.

## 2019-09-25 NOTE — PROGRESS NOTE ADULT - SUBJECTIVE AND OBJECTIVE BOX
Patient seen and examined at bedside.   no complaints  tolerating PO     acetaminophen   Tablet .. 650 milliGRAM(s) every 6 hours PRN  ALBUTerol    90 MICROgram(s) HFA Inhaler 2 Puff(s) four times a day PRN  amLODIPine   Tablet 10 milliGRAM(s) daily  buDESOnide  80 MICROgram(s)/formoterol 4.5 MICROgram(s) Inhaler 2 Puff(s) two times a day  heparin  Injectable 7500 Unit(s) every 8 hours  HYDROmorphone  Injectable 0.5 milliGRAM(s) every 6 hours PRN  influenza   Vaccine 0.5 milliLiter(s) once  magnesium sulfate  IVPB 2 Gram(s) once  metoprolol succinate ER 25 milliGRAM(s) daily  ondansetron Injectable 4 milliGRAM(s) once PRN  pantoprazole  Injectable 40 milliGRAM(s) daily      Allergies    No Known Allergies    Intolerances        T(C): , Max: 36.9 (09-24-19 @ 17:29)  T(F): , Max: 98.5 (09-25-19 @ 08:48)  HR: 88 (09-25-19 @ 08:48)  BP: 129/86 (09-25-19 @ 08:48)  BP(mean): --  RR: 15 (09-25-19 @ 08:48)  SpO2: 96% (09-25-19 @ 08:48)  Wt(kg): --    09-24 @ 07:01  -  09-25 @ 07:00  --------------------------------------------------------  IN:    lactated ringers.: 800 mL    lactated ringers.: 650 mL    Oral Fluid: 1320 mL  Total IN: 2770 mL    OUT:    Voided: 2450 mL  Total OUT: 2450 mL    Total NET: 320 mL              PHYSICAL EXAM:  Constitutional: Well appearing.  No acute distress  ENMT: Moist mucous membrane.  No cyanosis.  Neck: Supple. No JVD.  Respiratory: Clear to auscultation   Cardiovascular: S1, S2.  Regular rate and rhythm.    Gastrointestinal: soft, non-tender  Extremities: Warm.  No lower extremity edema.    Neurological: No focal deficits.  Skin: Warm. Dry.     Psychiatric: Normal affect.    ACCESS:       LABS:                        10.3   9.08  )-----------( 193      ( 25 Sep 2019 06:52 )             31.8     09-25    139  |  109<H>  |  29<H>  ----------------------------<  84  4.0   |  18<L>  |  3.11<H>    Ca    8.6      25 Sep 2019 06:52  Phos  3.1     09-25  Mg     1.7     09-25                  RADIOLOGY & ADDITIONAL STUDIES:

## 2019-09-26 ENCOUNTER — OTHER (OUTPATIENT)
Age: 34
End: 2019-09-26

## 2019-09-27 LAB — SURGICAL PATHOLOGY STUDY: SIGNIFICANT CHANGE UP

## 2019-09-28 DIAGNOSIS — R79.89 OTHER SPECIFIED ABNORMAL FINDINGS OF BLOOD CHEMISTRY: ICD-10-CM

## 2019-09-28 DIAGNOSIS — I12.9 HYPERTENSIVE CHRONIC KIDNEY DISEASE WITH STAGE 1 THROUGH STAGE 4 CHRONIC KIDNEY DISEASE, OR UNSPECIFIED CHRONIC KIDNEY DISEASE: ICD-10-CM

## 2019-09-28 DIAGNOSIS — Z90.49 ACQUIRED ABSENCE OF OTHER SPECIFIED PARTS OF DIGESTIVE TRACT: ICD-10-CM

## 2019-09-28 DIAGNOSIS — K21.9 GASTRO-ESOPHAGEAL REFLUX DISEASE WITHOUT ESOPHAGITIS: ICD-10-CM

## 2019-09-28 DIAGNOSIS — E66.01 MORBID (SEVERE) OBESITY DUE TO EXCESS CALORIES: ICD-10-CM

## 2019-09-28 DIAGNOSIS — J45.909 UNSPECIFIED ASTHMA, UNCOMPLICATED: ICD-10-CM

## 2019-09-28 DIAGNOSIS — N18.4 CHRONIC KIDNEY DISEASE, STAGE 4 (SEVERE): ICD-10-CM

## 2019-09-28 DIAGNOSIS — Z98.890 OTHER SPECIFIED POSTPROCEDURAL STATES: ICD-10-CM

## 2019-10-02 PROCEDURE — 85027 COMPLETE CBC AUTOMATED: CPT

## 2019-10-02 PROCEDURE — 86901 BLOOD TYPING SEROLOGIC RH(D): CPT

## 2019-10-02 PROCEDURE — 84100 ASSAY OF PHOSPHORUS: CPT

## 2019-10-02 PROCEDURE — 88341 IMHCHEM/IMCYTCHM EA ADD ANTB: CPT

## 2019-10-02 PROCEDURE — 86850 RBC ANTIBODY SCREEN: CPT

## 2019-10-02 PROCEDURE — 80048 BASIC METABOLIC PNL TOTAL CA: CPT

## 2019-10-02 PROCEDURE — 94640 AIRWAY INHALATION TREATMENT: CPT

## 2019-10-02 PROCEDURE — 86900 BLOOD TYPING SEROLOGIC ABO: CPT

## 2019-10-02 PROCEDURE — C1889: CPT

## 2019-10-02 PROCEDURE — S2900: CPT

## 2019-10-02 PROCEDURE — 36415 COLL VENOUS BLD VENIPUNCTURE: CPT

## 2019-10-02 PROCEDURE — 88307 TISSUE EXAM BY PATHOLOGIST: CPT

## 2019-10-02 PROCEDURE — 83735 ASSAY OF MAGNESIUM: CPT

## 2019-10-03 ENCOUNTER — APPOINTMENT (OUTPATIENT)
Dept: BARIATRICS | Facility: CLINIC | Age: 34
End: 2019-10-03
Payer: COMMERCIAL

## 2019-10-03 VITALS
OXYGEN SATURATION: 100 % | HEIGHT: 64 IN | WEIGHT: 247 LBS | BODY MASS INDEX: 42.17 KG/M2 | HEART RATE: 137 BPM | TEMPERATURE: 99.6 F | SYSTOLIC BLOOD PRESSURE: 137 MMHG | DIASTOLIC BLOOD PRESSURE: 97 MMHG

## 2019-10-03 VITALS — HEART RATE: 209 BPM

## 2019-10-03 DIAGNOSIS — N18.3 CHRONIC KIDNEY DISEASE, STAGE 3 (MODERATE): ICD-10-CM

## 2019-10-03 DIAGNOSIS — Z09 ENCOUNTER FOR FOLLOW-UP EXAMINATION AFTER COMPLETED TREATMENT FOR CONDITIONS OTHER THAN MALIGNANT NEOPLASM: ICD-10-CM

## 2019-10-03 DIAGNOSIS — R00.0 TACHYCARDIA, UNSPECIFIED: ICD-10-CM

## 2019-10-03 PROCEDURE — 99024 POSTOP FOLLOW-UP VISIT: CPT

## 2019-10-04 ENCOUNTER — OTHER (OUTPATIENT)
Age: 34
End: 2019-10-04

## 2019-10-04 LAB
ALBUMIN SERPL ELPH-MCNC: 4.4 G/DL
ALP BLD-CCNC: 79 U/L
ALT SERPL-CCNC: 19 U/L
ANION GAP SERPL CALC-SCNC: 16 MMOL/L
AST SERPL-CCNC: 16 U/L
BASOPHILS # BLD AUTO: 0.07 K/UL
BASOPHILS NFR BLD AUTO: 0.8 %
BILIRUB SERPL-MCNC: 0.2 MG/DL
BUN SERPL-MCNC: 27 MG/DL
CALCIUM SERPL-MCNC: 9.3 MG/DL
CHLORIDE SERPL-SCNC: 105 MMOL/L
CO2 SERPL-SCNC: 16 MMOL/L
CREAT SERPL-MCNC: 3.09 MG/DL
EOSINOPHIL # BLD AUTO: 0.22 K/UL
EOSINOPHIL NFR BLD AUTO: 2.4 %
GLUCOSE SERPL-MCNC: 82 MG/DL
HCT VFR BLD CALC: 40.6 %
HGB BLD-MCNC: 13.1 G/DL
IMM GRANULOCYTES NFR BLD AUTO: 0.2 %
LYMPHOCYTES # BLD AUTO: 1.1 K/UL
LYMPHOCYTES NFR BLD AUTO: 12 %
MAGNESIUM SERPL-MCNC: 2.2 MG/DL
MAN DIFF?: NORMAL
MCHC RBC-ENTMCNC: 29.4 PG
MCHC RBC-ENTMCNC: 32.3 GM/DL
MCV RBC AUTO: 91 FL
MONOCYTES # BLD AUTO: 0.63 K/UL
MONOCYTES NFR BLD AUTO: 6.9 %
NEUTROPHILS # BLD AUTO: 7.13 K/UL
NEUTROPHILS NFR BLD AUTO: 77.7 %
PHOSPHATE SERPL-MCNC: 3 MG/DL
PLATELET # BLD AUTO: 330 K/UL
POTASSIUM SERPL-SCNC: 4.9 MMOL/L
PROT SERPL-MCNC: 7.2 G/DL
RBC # BLD: 4.46 M/UL
RBC # FLD: 13.6 %
SODIUM SERPL-SCNC: 137 MMOL/L
WBC # FLD AUTO: 9.17 K/UL

## 2019-11-20 ENCOUNTER — APPOINTMENT (OUTPATIENT)
Dept: BARIATRICS | Facility: CLINIC | Age: 34
End: 2019-11-20
Payer: COMMERCIAL

## 2019-11-20 VITALS
WEIGHT: 235.13 LBS | OXYGEN SATURATION: 99 % | SYSTOLIC BLOOD PRESSURE: 141 MMHG | DIASTOLIC BLOOD PRESSURE: 89 MMHG | BODY MASS INDEX: 40.14 KG/M2 | HEART RATE: 83 BPM | TEMPERATURE: 98.4 F | HEIGHT: 64 IN

## 2019-11-20 DIAGNOSIS — E66.01 MORBID (SEVERE) OBESITY DUE TO EXCESS CALORIES: ICD-10-CM

## 2019-11-20 DIAGNOSIS — E46 UNSPECIFIED PROTEIN-CALORIE MALNUTRITION: ICD-10-CM

## 2019-11-20 PROCEDURE — 99024 POSTOP FOLLOW-UP VISIT: CPT

## 2019-12-09 ENCOUNTER — APPOINTMENT (OUTPATIENT)
Dept: NEPHROLOGY | Facility: CLINIC | Age: 34
End: 2019-12-09
Payer: COMMERCIAL

## 2019-12-09 VITALS
WEIGHT: 228 LBS | HEART RATE: 88 BPM | BODY MASS INDEX: 39.14 KG/M2 | DIASTOLIC BLOOD PRESSURE: 100 MMHG | SYSTOLIC BLOOD PRESSURE: 142 MMHG

## 2019-12-09 PROCEDURE — 99214 OFFICE O/P EST MOD 30 MIN: CPT

## 2019-12-09 RX ORDER — ELECTROLYTES/DEXTROSE
SOLUTION, ORAL ORAL DAILY
Refills: 0 | Status: ACTIVE | COMMUNITY
Start: 2019-12-09

## 2019-12-09 RX ORDER — CHLORHEXIDINE GLUCONATE 4 %
1000 LIQUID (ML) TOPICAL DAILY
Qty: 30 | Refills: 2 | Status: ACTIVE | COMMUNITY
Start: 2019-12-09

## 2019-12-09 RX ORDER — LISINOPRIL 20 MG/1
20 TABLET ORAL DAILY
Refills: 0 | Status: DISCONTINUED | COMMUNITY
Start: 2018-04-19 | End: 2019-12-09

## 2019-12-09 NOTE — HISTORY OF PRESENT ILLNESS
[FreeTextEntry1] : f/u CKD, HTN \par s/p gstric sleeve surgery Sept 23 -- has lost about 33 lbs since and still losing \par has been off  lisinopril since surgery (unsure why) \par BP at home still running 140s systolic generally -- past few days 130s \par feeling well \par PCP Dr West

## 2019-12-09 NOTE — ASSESSMENT
[FreeTextEntry1] : CKD 4 -- HTN, obesity and secondary FSGS\par BP remains high despite weight loss\par off ACEI --possibly due to worsened renal fxn and hyperkalemia perioperatively \par will check labs today \par inclined to restart ACEI if renal fxn better nathaly if proteinuria \par consider another agent if fxn no better or K high -- with high HR may consider increase beta blocker (though has asthma hx) or add guanfacine?\par f/u 2-3 mos\par

## 2019-12-09 NOTE — PHYSICAL EXAM
[General Appearance - Alert] : alert [General Appearance - In No Acute Distress] : in no acute distress [Jugular Venous Distention Increased] : there was no jugular-venous distention [Sclera] : the sclera and conjunctiva were normal [Auscultation Breath Sounds / Voice Sounds] : lungs were clear to auscultation bilaterally [Heart Sounds Gallop] : no gallops [Heart Sounds Pericardial Friction Rub] : no pericardial rub [Murmurs] : no murmurs [Tachycardic ___] : the heart rate was tachycardic at [unfilled] bpm [Regular] : the rhythm was regular [Edema] : there was no peripheral edema [Abdomen Soft] : soft [Abdomen Tenderness] : non-tender [No CVA Tenderness] : no ~M costovertebral angle tenderness [Involuntary Movements] : no involuntary movements were seen [] : no rash [No Focal Deficits] : no focal deficits [Affect] : the affect was normal [Oriented To Time, Place, And Person] : oriented to person, place, and time

## 2019-12-10 LAB
APPEARANCE: CLEAR
BACTERIA: NEGATIVE
BILIRUBIN URINE: NEGATIVE
BLOOD URINE: NEGATIVE
COLOR: NORMAL
CREAT SPEC-SCNC: 137 MG/DL
CREAT/PROT UR: 0.8 RATIO
GLUCOSE QUALITATIVE U: NEGATIVE
HYALINE CASTS: 1 /LPF
KETONES URINE: NEGATIVE
LEUKOCYTE ESTERASE URINE: NEGATIVE
MAGNESIUM SERPL-MCNC: 2.1 MG/DL
MICROSCOPIC-UA: NORMAL
NITRITE URINE: NEGATIVE
PH URINE: 6
PHOSPHATE SERPL-MCNC: 2.8 MG/DL
PROT UR-MCNC: 112 MG/DL
PROTEIN URINE: ABNORMAL
RED BLOOD CELLS URINE: 3 /HPF
SPECIFIC GRAVITY URINE: 1.01
SQUAMOUS EPITHELIAL CELLS: 1 /HPF
URATE SERPL-MCNC: 6.1 MG/DL
UROBILINOGEN URINE: NORMAL
WHITE BLOOD CELLS URINE: 2 /HPF

## 2019-12-11 ENCOUNTER — OTHER (OUTPATIENT)
Age: 34
End: 2019-12-11

## 2019-12-13 ENCOUNTER — OTHER (OUTPATIENT)
Age: 34
End: 2019-12-13

## 2019-12-13 LAB
25(OH)D3 SERPL-MCNC: 33.9 NG/ML
ALBUMIN SERPL ELPH-MCNC: 4 G/DL
ALP BLD-CCNC: 98 U/L
ALT SERPL-CCNC: 16 U/L
ANION GAP SERPL CALC-SCNC: 12 MMOL/L
AST SERPL-CCNC: 17 U/L
BASOPHILS # BLD AUTO: 0.04 K/UL
BASOPHILS NFR BLD AUTO: 0.9 %
BILIRUB SERPL-MCNC: 0.2 MG/DL
BUN SERPL-MCNC: 20 MG/DL
CALCIUM SERPL-MCNC: 9.2 MG/DL
CALCIUM SERPL-MCNC: 9.2 MG/DL
CHLORIDE SERPL-SCNC: 110 MMOL/L
CHOLEST SERPL-MCNC: 121 MG/DL
CHOLEST/HDLC SERPL: 2 RATIO
CO2 SERPL-SCNC: 20 MMOL/L
CREAT SERPL-MCNC: 2.46 MG/DL
EOSINOPHIL # BLD AUTO: 0.32 K/UL
EOSINOPHIL NFR BLD AUTO: 7.4 %
ESTIMATED AVERAGE GLUCOSE: 100 MG/DL
FOLATE SERPL-MCNC: 2.6 NG/ML
GLUCOSE SERPL-MCNC: 82 MG/DL
HBA1C MFR BLD HPLC: 5.1 %
HCT VFR BLD CALC: 39.5 %
HDLC SERPL-MCNC: 60 MG/DL
HGB BLD-MCNC: 12.3 G/DL
IMM GRANULOCYTES NFR BLD AUTO: 0 %
IRON SATN MFR SERPL: 20 %
IRON SERPL-MCNC: 62 UG/DL
LDLC SERPL CALC-MCNC: 47 MG/DL
LYMPHOCYTES # BLD AUTO: 1.18 K/UL
LYMPHOCYTES NFR BLD AUTO: 27.3 %
MAN DIFF?: NORMAL
MCHC RBC-ENTMCNC: 28.2 PG
MCHC RBC-ENTMCNC: 31.1 GM/DL
MCV RBC AUTO: 90.6 FL
MONOCYTES # BLD AUTO: 0.34 K/UL
MONOCYTES NFR BLD AUTO: 7.9 %
NEUTROPHILS # BLD AUTO: 2.44 K/UL
NEUTROPHILS NFR BLD AUTO: 56.5 %
PARATHYROID HORMONE INTACT: 101 PG/ML
PLATELET # BLD AUTO: 298 K/UL
POTASSIUM SERPL-SCNC: 4.5 MMOL/L
PREALB SERPL NEPH-MCNC: 22 MG/DL
PROT SERPL-MCNC: 6.7 G/DL
RBC # BLD: 4.36 M/UL
RBC # FLD: 14.4 %
SODIUM SERPL-SCNC: 142 MMOL/L
TIBC SERPL-MCNC: 315 UG/DL
TRIGL SERPL-MCNC: 71 MG/DL
TSH SERPL-ACNC: 3.12 UIU/ML
UIBC SERPL-MCNC: 253 UG/DL
VIT A SERPL-MCNC: 45.6 UG/DL
VIT B12 SERPL-MCNC: 848 PG/ML
WBC # FLD AUTO: 4.32 K/UL
ZINC SERPL-MCNC: 88 UG/DL

## 2019-12-16 LAB
A-TOCOPHEROL VIT E SERPL-MCNC: 7.8 MG/L
BETA+GAMMA TOCOPHEROL SERPL-MCNC: 0.9 MG/L
VIT B1 SERPL-MCNC: 90.3 NMOL/L

## 2020-01-08 ENCOUNTER — APPOINTMENT (OUTPATIENT)
Dept: BARIATRICS | Facility: CLINIC | Age: 35
End: 2020-01-08
Payer: COMMERCIAL

## 2020-01-08 VITALS
DIASTOLIC BLOOD PRESSURE: 87 MMHG | SYSTOLIC BLOOD PRESSURE: 127 MMHG | HEIGHT: 64 IN | BODY MASS INDEX: 37.56 KG/M2 | TEMPERATURE: 97.7 F | HEART RATE: 94 BPM | WEIGHT: 220 LBS | OXYGEN SATURATION: 99 %

## 2020-01-08 DIAGNOSIS — Z98.84 BARIATRIC SURGERY STATUS: ICD-10-CM

## 2020-01-08 PROCEDURE — 99213 OFFICE O/P EST LOW 20 MIN: CPT

## 2020-02-04 ENCOUNTER — APPOINTMENT (OUTPATIENT)
Dept: NEPHROLOGY | Facility: CLINIC | Age: 35
End: 2020-02-04

## 2020-03-04 PROBLEM — Z98.84 S/P BARIATRIC SURGERY: Status: ACTIVE | Noted: 2019-10-03

## 2020-03-04 NOTE — HISTORY OF PRESENT ILLNESS
[de-identified] : Patient is doing well and has no complaints. She is eating a proper diet and taking her vitamins. She is not exercising very much.

## 2020-04-08 ENCOUNTER — APPOINTMENT (OUTPATIENT)
Dept: BARIATRICS | Facility: CLINIC | Age: 35
End: 2020-04-08

## 2020-05-20 LAB
25(OH)D3 SERPL-MCNC: 31.7 NG/ML
ALBUMIN SERPL ELPH-MCNC: 4.2 G/DL
ALP BLD-CCNC: 95 U/L
ALT SERPL-CCNC: 20 U/L
ANION GAP SERPL CALC-SCNC: 15 MMOL/L
APPEARANCE: CLEAR
AST SERPL-CCNC: 17 U/L
BACTERIA: ABNORMAL
BASOPHILS # BLD AUTO: 0.04 K/UL
BASOPHILS NFR BLD AUTO: 0.9 %
BILIRUB SERPL-MCNC: 0.2 MG/DL
BILIRUBIN URINE: NEGATIVE
BLOOD URINE: NEGATIVE
BUN SERPL-MCNC: 16 MG/DL
CALCIUM SERPL-MCNC: 9.1 MG/DL
CALCIUM SERPL-MCNC: 9.1 MG/DL
CHLORIDE SERPL-SCNC: 111 MMOL/L
CHOLEST SERPL-MCNC: 146 MG/DL
CHOLEST/HDLC SERPL: 1.8 RATIO
CO2 SERPL-SCNC: 17 MMOL/L
COLOR: NORMAL
CREAT SERPL-MCNC: 2.32 MG/DL
CREAT SPEC-SCNC: 162 MG/DL
CREAT/PROT UR: 0.5 RATIO
EOSINOPHIL # BLD AUTO: 0.16 K/UL
EOSINOPHIL NFR BLD AUTO: 3.8 %
GLUCOSE QUALITATIVE U: NEGATIVE
GLUCOSE SERPL-MCNC: 86 MG/DL
HCT VFR BLD CALC: 38 %
HDLC SERPL-MCNC: 82 MG/DL
HGB BLD-MCNC: 12.4 G/DL
HYALINE CASTS: 1 /LPF
IMM GRANULOCYTES NFR BLD AUTO: 0 %
KETONES URINE: NEGATIVE
LDLC SERPL CALC-MCNC: 51 MG/DL
LEUKOCYTE ESTERASE URINE: NEGATIVE
LYMPHOCYTES # BLD AUTO: 1.66 K/UL
LYMPHOCYTES NFR BLD AUTO: 39.1 %
MAGNESIUM SERPL-MCNC: 2.3 MG/DL
MAN DIFF?: NORMAL
MCHC RBC-ENTMCNC: 30.2 PG
MCHC RBC-ENTMCNC: 32.6 GM/DL
MCV RBC AUTO: 92.7 FL
MICROSCOPIC-UA: NORMAL
MONOCYTES # BLD AUTO: 0.33 K/UL
MONOCYTES NFR BLD AUTO: 7.8 %
NEUTROPHILS # BLD AUTO: 2.06 K/UL
NEUTROPHILS NFR BLD AUTO: 48.4 %
NITRITE URINE: NEGATIVE
PARATHYROID HORMONE INTACT: 94 PG/ML
PH URINE: 6
PHOSPHATE SERPL-MCNC: 2 MG/DL
PLATELET # BLD AUTO: 246 K/UL
POTASSIUM SERPL-SCNC: 4.5 MMOL/L
PROT SERPL-MCNC: 6.9 G/DL
PROT UR-MCNC: 80 MG/DL
PROTEIN URINE: ABNORMAL
RBC # BLD: 4.1 M/UL
RBC # FLD: 14.6 %
RED BLOOD CELLS URINE: 4 /HPF
SODIUM SERPL-SCNC: 144 MMOL/L
SPECIFIC GRAVITY URINE: 1.01
SQUAMOUS EPITHELIAL CELLS: 2 /HPF
TRIGL SERPL-MCNC: 65 MG/DL
URATE SERPL-MCNC: 6.4 MG/DL
UROBILINOGEN URINE: NORMAL
WBC # FLD AUTO: 4.25 K/UL
WHITE BLOOD CELLS URINE: 3 /HPF

## 2020-05-22 ENCOUNTER — APPOINTMENT (OUTPATIENT)
Dept: NEPHROLOGY | Facility: CLINIC | Age: 35
End: 2020-05-22
Payer: COMMERCIAL

## 2020-05-22 DIAGNOSIS — R42 DIZZINESS AND GIDDINESS: ICD-10-CM

## 2020-05-22 PROCEDURE — 99214 OFFICE O/P EST MOD 30 MIN: CPT | Mod: 95

## 2020-05-22 NOTE — ASSESSMENT
[FreeTextEntry1] : CKD 3/4 due to FSGS stable (likely secondary FSGS) \par proteinuria controlled with ACEI low dose.\par will try take lisinopril hs to separate out meds for more even control -- see if helps symptoms. might increase back to 10 mg if tolerates\par can change vit D to OTC 1000 IU/d - out of ergocalciferol\par nahco3 650 qd for metabolic acidosis\par advised cont follow BP at home -- call if sx or if remains uncontrolled \par f/u 4 mos -- recheck labs\par \par

## 2020-05-22 NOTE — REASON FOR VISIT
[Home] : at home, [unfilled] , at the time of the visit. [Medical Office: ([facility])___] : at the medical office located in  [Follow-Up] : a follow-up visit [Verbal consent obtained from patient] : the patient, [unfilled]

## 2020-05-22 NOTE — REVIEW OF SYSTEMS
[As Noted in HPI] : as noted in HPI [Dizziness] : dizziness [Negative] : Heme/Lymph [Difficulty Walking] : no difficulty walking [Fainting] : no fainting

## 2020-05-22 NOTE — HISTORY OF PRESENT ILLNESS
[FreeTextEntry1] : Discussed with patient: You have chosen to receive care through the use of tele-media. It enables health care providers at different locations to provide safe, effective, and convenient care through the use of technology. Please note this is a billable encounter. As with any health care service, there are risks associated with the use of tele-media, including  issues. You understand that I cannot physically examine you and that you may need to come to the office to complete the assessment. \par -Patient agreed verbally and understands the risks and benefits of tele-media as explained. All questions regarding tele-media encounters were answered\par \par conducted visit via video--\par pt generally has been well though gets lightheaded when takes all meds -- improved on 1/2 dose lisinopril compared with full pill.  Even better when doesn’t take lisinopril at all but BP very high off if it. Now BP is abt 150/100 before am meds and goes to 130s/70-80s after meds but gets lighheaded. \par labs reviewed with pt -- renal fxn stable

## 2020-09-29 ENCOUNTER — APPOINTMENT (OUTPATIENT)
Dept: NEPHROLOGY | Facility: CLINIC | Age: 35
End: 2020-09-29
Payer: COMMERCIAL

## 2020-09-29 VITALS
HEART RATE: 76 BPM | DIASTOLIC BLOOD PRESSURE: 87 MMHG | SYSTOLIC BLOOD PRESSURE: 128 MMHG | BODY MASS INDEX: 33.64 KG/M2 | WEIGHT: 196 LBS

## 2020-09-29 DIAGNOSIS — R80.9 PROTEINURIA, UNSPECIFIED: ICD-10-CM

## 2020-09-29 PROCEDURE — 99214 OFFICE O/P EST MOD 30 MIN: CPT

## 2020-09-30 NOTE — ASSESSMENT
[FreeTextEntry1] : CKD 4 stable \par BP improved and may acceptable on higher dose lisinopril \par proteinuria likely controlled (by UA) \par hco3 low\par low vit D again off vit D\par vertigo sx-- don’t seem BP related and to see ENT\par \par will cont lisinopril at 10 mg/d\par increase nahco3 to BID\par restart vit D \par follow BP \par cont weight loss \par f/u 3 mos \par \par

## 2020-09-30 NOTE — HISTORY OF PRESENT ILLNESS
[FreeTextEntry1] : f/u CKD \par has been on lisinopril 5 hs -- still with intermittent vertigo type sx - can be random and not orthostatic in nature. to see ENT\par PCP increased lisinopril to 10 mg (back to am) and doesn’t seem to to affect sx (increased Sept 9)\par BP at home 130-140/90 range \par still losing weight post bariatric surgery -more slowly \par has been getting int back pain and leg cramping\par stopped vit D after last visit\par PCP Dr Vicky West\par \par

## 2021-01-14 LAB
25(OH)D3 SERPL-MCNC: 21.9 NG/ML
ALBUMIN SERPL ELPH-MCNC: 4.6 G/DL
ALP BLD-CCNC: 89 U/L
ALT SERPL-CCNC: 17 U/L
ANION GAP SERPL CALC-SCNC: 14 MMOL/L
APPEARANCE: ABNORMAL
AST SERPL-CCNC: 15 U/L
BACTERIA: ABNORMAL
BASOPHILS # BLD AUTO: 0.04 K/UL
BASOPHILS NFR BLD AUTO: 1 %
BILIRUB SERPL-MCNC: 0.3 MG/DL
BILIRUBIN URINE: NEGATIVE
BLOOD URINE: NORMAL
BUN SERPL-MCNC: 32 MG/DL
CALCIUM SERPL-MCNC: 9.3 MG/DL
CALCIUM SERPL-MCNC: 9.3 MG/DL
CHLORIDE SERPL-SCNC: 107 MMOL/L
CO2 SERPL-SCNC: 18 MMOL/L
COLOR: YELLOW
CREAT SERPL-MCNC: 2.52 MG/DL
CREAT SPEC-SCNC: 122 MG/DL
CREAT/PROT UR: 0.3 RATIO
EOSINOPHIL # BLD AUTO: 0.38 K/UL
EOSINOPHIL NFR BLD AUTO: 9.1 %
GLUCOSE QUALITATIVE U: NEGATIVE
GLUCOSE SERPL-MCNC: 87 MG/DL
HCT VFR BLD CALC: 40.2 %
HGB BLD-MCNC: 13.1 G/DL
HYALINE CASTS: 5 /LPF
IMM GRANULOCYTES NFR BLD AUTO: 0 %
KETONES URINE: NEGATIVE
LEUKOCYTE ESTERASE URINE: NEGATIVE
LYMPHOCYTES # BLD AUTO: 1.5 K/UL
LYMPHOCYTES NFR BLD AUTO: 35.8 %
MAGNESIUM SERPL-MCNC: 2.5 MG/DL
MAN DIFF?: NORMAL
MCHC RBC-ENTMCNC: 30.8 PG
MCHC RBC-ENTMCNC: 32.6 GM/DL
MCV RBC AUTO: 94.6 FL
MICROSCOPIC-UA: NORMAL
MONOCYTES # BLD AUTO: 0.32 K/UL
MONOCYTES NFR BLD AUTO: 7.6 %
NEUTROPHILS # BLD AUTO: 1.95 K/UL
NEUTROPHILS NFR BLD AUTO: 46.5 %
NITRITE URINE: NEGATIVE
PARATHYROID HORMONE INTACT: 209 PG/ML
PH URINE: 6.5
PHOSPHATE SERPL-MCNC: 3.6 MG/DL
PLATELET # BLD AUTO: 282 K/UL
POTASSIUM SERPL-SCNC: 4.8 MMOL/L
PROT SERPL-MCNC: 7 G/DL
PROT UR-MCNC: 31 MG/DL
PROTEIN URINE: ABNORMAL
RBC # BLD: 4.25 M/UL
RBC # FLD: 13.4 %
RED BLOOD CELLS URINE: 4 /HPF
SODIUM SERPL-SCNC: 139 MMOL/L
SPECIFIC GRAVITY URINE: 1.01
SQUAMOUS EPITHELIAL CELLS: 9 /HPF
URATE SERPL-MCNC: 7 MG/DL
UROBILINOGEN URINE: NORMAL
WBC # FLD AUTO: 4.19 K/UL
WHITE BLOOD CELLS URINE: 9 /HPF

## 2021-01-19 ENCOUNTER — APPOINTMENT (OUTPATIENT)
Dept: NEPHROLOGY | Facility: CLINIC | Age: 36
End: 2021-01-19
Payer: COMMERCIAL

## 2021-01-19 VITALS
BODY MASS INDEX: 33.64 KG/M2 | SYSTOLIC BLOOD PRESSURE: 132 MMHG | HEART RATE: 87 BPM | DIASTOLIC BLOOD PRESSURE: 88 MMHG | WEIGHT: 196 LBS

## 2021-01-19 PROCEDURE — 99214 OFFICE O/P EST MOD 30 MIN: CPT

## 2021-01-19 PROCEDURE — 99072 ADDL SUPL MATRL&STAF TM PHE: CPT

## 2021-01-19 RX ORDER — ALBUTEROL SULFATE 90 UG/1
108 (90 BASE) INHALANT RESPIRATORY (INHALATION)
Qty: 18 | Refills: 0 | Status: ACTIVE | COMMUNITY
Start: 2020-12-17

## 2021-01-19 RX ORDER — ERGOCALCIFEROL 1.25 MG/1
1.25 MG CAPSULE ORAL
Qty: 4 | Refills: 5 | Status: DISCONTINUED | COMMUNITY
Start: 2019-09-16 | End: 2021-01-19

## 2021-01-19 RX ORDER — COLD-HOT PACK
125 MCG EACH MISCELLANEOUS
Qty: 30 | Refills: 5 | Status: ACTIVE | COMMUNITY
Start: 2021-01-19

## 2021-01-19 NOTE — PHYSICAL EXAM
[General Appearance - Alert] : alert [General Appearance - In No Acute Distress] : in no acute distress [Sclera] : the sclera and conjunctiva were normal [Jugular Venous Distention Increased] : there was no jugular-venous distention [Auscultation Breath Sounds / Voice Sounds] : lungs were clear to auscultation bilaterally [Heart Rate And Rhythm] : heart rate was normal and rhythm regular [Heart Sounds Gallop] : no gallops [Murmurs] : no murmurs [Heart Sounds Pericardial Friction Rub] : no pericardial rub [Edema] : there was no peripheral edema [Abdomen Soft] : soft [Abdomen Tenderness] : non-tender [No CVA Tenderness] : no ~M costovertebral angle tenderness [Abnormal Walk] : normal gait [Involuntary Movements] : no involuntary movements were seen [] : no rash [No Focal Deficits] : no focal deficits [Oriented To Time, Place, And Person] : oriented to person, place, and time [Affect] : the affect was normal

## 2021-01-19 NOTE — HISTORY OF PRESENT ILLNESS
[FreeTextEntry1] : f/u CKD \par on higher nahco3 since last visit \par taking lisinopril 10 mg /d still \par not taking vit D -- except as multivit\par weight stable\par meds and labs reviewed \par BP at home running 120s-130 systolic generally - unsure abt DBP \par PCP Dr Coats

## 2021-01-19 NOTE — ASSESSMENT
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 We discussed having a regular exercise regimen and the importance of a healthy diet including fruits and vegetables and stressed weight loss. We discussed a low Na diet, and especially the importance of limiting processed foods which tend to have large amount of sodium.-- is determined to lose more weight\par follow davina eBP and document #s --f/u 3 mos to review and check labs \par \par \par \par \par \par \par \par

## 2021-05-06 LAB
25(OH)D3 SERPL-MCNC: 27.6 NG/ML
ALBUMIN SERPL ELPH-MCNC: 4.3 G/DL
ALP BLD-CCNC: 98 U/L
ALT SERPL-CCNC: 13 U/L
ANION GAP SERPL CALC-SCNC: 15 MMOL/L
APPEARANCE: CLEAR
AST SERPL-CCNC: 13 U/L
BACTERIA: ABNORMAL
BASOPHILS # BLD AUTO: 0.04 K/UL
BASOPHILS NFR BLD AUTO: 0.9 %
BILIRUB SERPL-MCNC: 0.3 MG/DL
BILIRUBIN URINE: NEGATIVE
BLOOD URINE: NEGATIVE
BUN SERPL-MCNC: 27 MG/DL
CALCIUM SERPL-MCNC: 9.5 MG/DL
CALCIUM SERPL-MCNC: 9.5 MG/DL
CHLORIDE SERPL-SCNC: 110 MMOL/L
CHOLEST SERPL-MCNC: 152 MG/DL
CO2 SERPL-SCNC: 18 MMOL/L
COLOR: NORMAL
CREAT SERPL-MCNC: 2.32 MG/DL
CREAT SPEC-SCNC: 158 MG/DL
CREAT/PROT UR: 0.2 RATIO
EOSINOPHIL # BLD AUTO: 0.2 K/UL
EOSINOPHIL NFR BLD AUTO: 4.5 %
GLUCOSE QUALITATIVE U: NEGATIVE
GLUCOSE SERPL-MCNC: 89 MG/DL
HCT VFR BLD CALC: 39.9 %
HDLC SERPL-MCNC: 86 MG/DL
HGB BLD-MCNC: 13.1 G/DL
HYALINE CASTS: 2 /LPF
IMM GRANULOCYTES NFR BLD AUTO: 0.2 %
KETONES URINE: NEGATIVE
LDLC SERPL CALC-MCNC: 48 MG/DL
LEUKOCYTE ESTERASE URINE: NEGATIVE
LYMPHOCYTES # BLD AUTO: 1.62 K/UL
LYMPHOCYTES NFR BLD AUTO: 36.1 %
MAGNESIUM SERPL-MCNC: 2.1 MG/DL
MAN DIFF?: NORMAL
MCHC RBC-ENTMCNC: 30.8 PG
MCHC RBC-ENTMCNC: 32.8 GM/DL
MCV RBC AUTO: 93.7 FL
MICROSCOPIC-UA: NORMAL
MONOCYTES # BLD AUTO: 0.42 K/UL
MONOCYTES NFR BLD AUTO: 9.4 %
NEUTROPHILS # BLD AUTO: 2.2 K/UL
NEUTROPHILS NFR BLD AUTO: 48.9 %
NITRITE URINE: NEGATIVE
NONHDLC SERPL-MCNC: 66 MG/DL
PARATHYROID HORMONE INTACT: 198 PG/ML
PH URINE: 6
PHOSPHATE SERPL-MCNC: 3 MG/DL
PLATELET # BLD AUTO: 260 K/UL
POTASSIUM SERPL-SCNC: 4.8 MMOL/L
PROT SERPL-MCNC: 7.1 G/DL
PROT UR-MCNC: 34 MG/DL
PROTEIN URINE: ABNORMAL
RBC # BLD: 4.26 M/UL
RBC # FLD: 13.9 %
RED BLOOD CELLS URINE: 5 /HPF
SODIUM SERPL-SCNC: 143 MMOL/L
SPECIFIC GRAVITY URINE: 1.01
SQUAMOUS EPITHELIAL CELLS: 7 /HPF
TRIGL SERPL-MCNC: 93 MG/DL
URATE SERPL-MCNC: 7.4 MG/DL
UROBILINOGEN URINE: NORMAL
WBC # FLD AUTO: 4.49 K/UL
WHITE BLOOD CELLS URINE: 4 /HPF

## 2021-05-10 ENCOUNTER — APPOINTMENT (OUTPATIENT)
Dept: NEPHROLOGY | Facility: CLINIC | Age: 36
End: 2021-05-10

## 2021-08-12 ENCOUNTER — APPOINTMENT (OUTPATIENT)
Dept: NEPHROLOGY | Facility: CLINIC | Age: 36
End: 2021-08-12
Payer: COMMERCIAL

## 2021-08-12 LAB
BASOPHILS # BLD AUTO: 0.04 K/UL
BASOPHILS NFR BLD AUTO: 0.8 %
EOSINOPHIL # BLD AUTO: 0.15 K/UL
EOSINOPHIL NFR BLD AUTO: 2.9 %
HCT VFR BLD CALC: 37.6 %
HGB BLD-MCNC: 12.7 G/DL
IMM GRANULOCYTES NFR BLD AUTO: 0.2 %
LYMPHOCYTES # BLD AUTO: 1.34 K/UL
LYMPHOCYTES NFR BLD AUTO: 26.1 %
MAN DIFF?: NORMAL
MCHC RBC-ENTMCNC: 30.8 PG
MCHC RBC-ENTMCNC: 33.8 GM/DL
MCV RBC AUTO: 91.3 FL
MONOCYTES # BLD AUTO: 0.41 K/UL
MONOCYTES NFR BLD AUTO: 8 %
NEUTROPHILS # BLD AUTO: 3.18 K/UL
NEUTROPHILS NFR BLD AUTO: 62 %
PLATELET # BLD AUTO: 259 K/UL
RBC # BLD: 4.12 M/UL
RBC # FLD: 13.7 %
WBC # FLD AUTO: 5.13 K/UL

## 2021-08-12 PROCEDURE — 99214 OFFICE O/P EST MOD 30 MIN: CPT

## 2021-08-13 VITALS — DIASTOLIC BLOOD PRESSURE: 90 MMHG | SYSTOLIC BLOOD PRESSURE: 144 MMHG | HEART RATE: 82 BPM

## 2021-08-13 NOTE — HISTORY OF PRESENT ILLNESS
[FreeTextEntry1] : f/u CKD \par no recent labs as missed last appt --last in may\par taking lisinopril 10 mg/d still \par back on vit D \par BP at home running 120/70s (has machine and phone bro) \par no complaints \par meds reviewed with pt and list updated\par PCP Dr Coats \par

## 2021-08-13 NOTE — ASSESSMENT
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- likely secondary to obesity / HTN\par proteinuria has been recently  with ACEI , BP control, weight loss\par HTN BP improved overall but still seems borderline - though much better at home, perhaps element of WCH \par last visit low HCO3 and low vit D with high PTH -- has had trouble with weekly dosing so started on vit D3 5000 IU/d (instead of weekly dosing ) -- follow vit D and PTH (may need calcitriol); and increased nahco3 to TID\par cont BP regimen \par We reviewed lifestyle modification for HTN.\par follow home BP and document #s --plan check 24 hour ABPM\par will check labs today and discuss these and 24  hour ABPM when done \par f/u 3-4 mos if stable

## 2021-08-13 NOTE — REVIEW OF SYSTEMS
[Lower Ext Edema] : lower extremity edema [Fever] : no fever [Chills] : no chills [Chest Pain] : no chest pain [Shortness Of Breath] : no shortness of breath [Cough] : no cough [Abdominal Pain] : no abdominal pain [Vomiting] : no vomiting [Constipation] : no constipation [Diarrhea] : no diarrhea [Dizziness] : no dizziness [Fainting] : no fainting [Anxiety] : no anxiety [Depression] : no depression

## 2021-08-13 NOTE — PHYSICAL EXAM
[General Appearance - Alert] : alert [General Appearance - In No Acute Distress] : in no acute distress [General Appearance - Well Nourished] : well nourished [General Appearance - Well Developed] : well developed [Jugular Venous Distention Increased] : there was no jugular-venous distention [] : no respiratory distress [Exaggerated Use Of Accessory Muscles For Inspiration] : no accessory muscle use [Auscultation Breath Sounds / Voice Sounds] : lungs were clear to auscultation bilaterally [Heart Rate And Rhythm] : heart rate was normal and rhythm regular [Heart Sounds] : normal S1 and S2 [Murmurs] : no murmurs [Edema] : there was no peripheral edema [Abdomen Soft] : soft [Abdomen Tenderness] : non-tender [No CVA Tenderness] : no ~M costovertebral angle tenderness [No Focal Deficits] : no focal deficits [Oriented To Time, Place, And Person] : oriented to person, place, and time [Impaired Insight] : insight and judgment were intact [Affect] : the affect was normal [Mood] : the mood was normal [Abnormal Walk] : normal gait [Involuntary Movements] : no involuntary movements were seen

## 2021-08-16 ENCOUNTER — APPOINTMENT (OUTPATIENT)
Dept: NEPHROLOGY | Facility: CLINIC | Age: 36
End: 2021-08-16
Payer: COMMERCIAL

## 2021-08-16 VITALS — SYSTOLIC BLOOD PRESSURE: 135 MMHG | HEART RATE: 95 BPM | DIASTOLIC BLOOD PRESSURE: 84 MMHG

## 2021-08-16 PROCEDURE — 93784 AMBL BP MNTR W/SOFTWARE: CPT

## 2021-08-16 NOTE — PROCEDURE
[FreeTextEntry1] : Placed ABPM on left upper arm. Gave instructions for device function and proper fit.\par approx sleep period: 11p-7a\par current BP meds: amlodipine 10mg, lisinopril 10mg daily, metorprolol succinate 25mg daily

## 2021-08-17 LAB
25(OH)D3 SERPL-MCNC: 30.9 NG/ML
ALBUMIN SERPL ELPH-MCNC: 4.4 G/DL
ALP BLD-CCNC: 89 U/L
ALT SERPL-CCNC: 13 U/L
ANION GAP SERPL CALC-SCNC: 11 MMOL/L
APPEARANCE: ABNORMAL
AST SERPL-CCNC: 16 U/L
BACTERIA: ABNORMAL
BILIRUB SERPL-MCNC: 0.3 MG/DL
BILIRUBIN URINE: NEGATIVE
BLOOD URINE: NEGATIVE
BUN SERPL-MCNC: 30 MG/DL
CALCIUM SERPL-MCNC: 9.4 MG/DL
CALCIUM SERPL-MCNC: 9.4 MG/DL
CHLORIDE SERPL-SCNC: 108 MMOL/L
CO2 SERPL-SCNC: 20 MMOL/L
COLOR: YELLOW
CREAT SERPL-MCNC: 2.38 MG/DL
CREAT SPEC-SCNC: 92 MG/DL
CREAT/PROT UR: 0.3 RATIO
GLUCOSE QUALITATIVE U: NEGATIVE
GLUCOSE SERPL-MCNC: 91 MG/DL
HYALINE CASTS: 0 /LPF
KETONES URINE: NEGATIVE
LEUKOCYTE ESTERASE URINE: NEGATIVE
MAGNESIUM SERPL-MCNC: 2.1 MG/DL
MICROSCOPIC-UA: NORMAL
NITRITE URINE: NEGATIVE
PARATHYROID HORMONE INTACT: 151 PG/ML
PH URINE: 6
PHOSPHATE SERPL-MCNC: 2.8 MG/DL
POTASSIUM SERPL-SCNC: 5.2 MMOL/L
PROT SERPL-MCNC: 6.9 G/DL
PROT UR-MCNC: 27 MG/DL
PROTEIN URINE: ABNORMAL
RED BLOOD CELLS URINE: 0 /HPF
SODIUM SERPL-SCNC: 139 MMOL/L
SPECIFIC GRAVITY URINE: 1.01
SQUAMOUS EPITHELIAL CELLS: >27 /HPF
URATE SERPL-MCNC: 7.8 MG/DL
URINE COMMENTS: NORMAL
UROBILINOGEN URINE: NORMAL
WHITE BLOOD CELLS URINE: 2 /HPF

## 2021-11-15 ENCOUNTER — APPOINTMENT (OUTPATIENT)
Dept: NEPHROLOGY | Facility: CLINIC | Age: 36
End: 2021-11-15

## 2022-02-02 ENCOUNTER — TRANSCRIPTION ENCOUNTER (OUTPATIENT)
Age: 37
End: 2022-02-02

## 2022-03-01 ENCOUNTER — APPOINTMENT (OUTPATIENT)
Dept: NEPHROLOGY | Facility: CLINIC | Age: 37
End: 2022-03-01

## 2022-05-20 ENCOUNTER — LABORATORY RESULT (OUTPATIENT)
Age: 37
End: 2022-05-20

## 2022-05-24 ENCOUNTER — APPOINTMENT (OUTPATIENT)
Dept: NEPHROLOGY | Facility: CLINIC | Age: 37
End: 2022-05-24
Payer: COMMERCIAL

## 2022-05-24 VITALS
BODY MASS INDEX: 36.9 KG/M2 | SYSTOLIC BLOOD PRESSURE: 144 MMHG | HEART RATE: 97 BPM | WEIGHT: 215 LBS | DIASTOLIC BLOOD PRESSURE: 104 MMHG

## 2022-05-24 DIAGNOSIS — E87.5 HYPERKALEMIA: ICD-10-CM

## 2022-05-24 DIAGNOSIS — N18.9 ACUTE KIDNEY FAILURE, UNSPECIFIED: ICD-10-CM

## 2022-05-24 DIAGNOSIS — N05.1 UNSPECIFIED NEPHRITIC SYNDROME WITH FOCAL AND SEGMENTAL GLOMERULAR LESIONS: ICD-10-CM

## 2022-05-24 DIAGNOSIS — N17.9 ACUTE KIDNEY FAILURE, UNSPECIFIED: ICD-10-CM

## 2022-05-24 PROCEDURE — 99215 OFFICE O/P EST HI 40 MIN: CPT

## 2022-05-24 RX ORDER — METOPROLOL SUCCINATE 25 MG/1
25 TABLET, EXTENDED RELEASE ORAL DAILY
Qty: 90 | Refills: 1 | Status: DISCONTINUED | COMMUNITY
Start: 2019-09-13 | End: 2022-05-24

## 2022-05-24 RX ORDER — ERGOCALCIFEROL 1.25 MG/1
1.25 MG CAPSULE ORAL
Qty: 12 | Refills: 3 | Status: ACTIVE | COMMUNITY
Start: 2022-05-24 | End: 1900-01-01

## 2022-05-24 RX ORDER — AMLODIPINE BESYLATE 10 MG/1
10 TABLET ORAL
Qty: 90 | Refills: 3 | Status: ACTIVE | COMMUNITY
Start: 2019-09-13 | End: 1900-01-01

## 2022-05-24 NOTE — ASSESSMENT
[FreeTextEntry1] : ARF on CKD-- appears to be progressed CKD -- already had advanced scarring on bx 2018 (with secondary FSGS) \par increased PTH and low vitamin D despite daily vit D supplement\par K up \par proteinuria increased \par suboptimal BP \par obesity\par \par will change metoprol to coreg 25 BID for HTN-- tolerated labetalol well in past despite asthma hx\par add ergocalciferol weekly -- follow PTh and may add calcitriol\par emphasized weight loss \par discussed k restriciton \par dicussed apparent progression of CKD-- advised txp eval and discussed need to prepare for RRT and options \par f/u 6 weeks with labs \par \par

## 2022-05-24 NOTE — HISTORY OF PRESENT ILLNESS
[FreeTextEntry1] : f/u CKD, HTN \par last here 8/2021\par no complaints \par gained weight -- but losing again \par home BP running 130-140 systolic range-- avg high 130s probably. doesn’t remember diastolic\par meds reviewed with pt --no changes. on vit D 5000 IU/d \par labs done and reviewed - see below\par PCP Dr Coats\par

## 2022-07-18 LAB
25(OH)D3 SERPL-MCNC: 28 NG/ML
ALBUMIN SERPL ELPH-MCNC: 4 G/DL
ALP BLD-CCNC: 90 U/L
ALT SERPL-CCNC: 14 U/L
ANION GAP SERPL CALC-SCNC: 13 MMOL/L
AST SERPL-CCNC: 10 U/L
BASOPHILS # BLD AUTO: 0.03 K/UL
BASOPHILS NFR BLD AUTO: 0.6 %
BILIRUB SERPL-MCNC: 0.2 MG/DL
BUN SERPL-MCNC: 55 MG/DL
CALCIUM SERPL-MCNC: 8.1 MG/DL
CALCIUM SERPL-MCNC: 8.1 MG/DL
CHLORIDE SERPL-SCNC: 114 MMOL/L
CO2 SERPL-SCNC: 14 MMOL/L
CREAT SERPL-MCNC: 6.22 MG/DL
CREAT SPEC-SCNC: 101 MG/DL
CREAT/PROT UR: 2.8 RATIO
EGFR: 8 ML/MIN/1.73M2
EOSINOPHIL # BLD AUTO: 0.26 K/UL
EOSINOPHIL NFR BLD AUTO: 5.4 %
GLUCOSE SERPL-MCNC: 92 MG/DL
HCT VFR BLD CALC: 32.8 %
HGB BLD-MCNC: 10.6 G/DL
IMM GRANULOCYTES NFR BLD AUTO: 0.2 %
LYMPHOCYTES # BLD AUTO: 1.53 K/UL
LYMPHOCYTES NFR BLD AUTO: 31.9 %
MAN DIFF?: NORMAL
MCHC RBC-ENTMCNC: 30.3 PG
MCHC RBC-ENTMCNC: 32.3 GM/DL
MCV RBC AUTO: 93.7 FL
MONOCYTES # BLD AUTO: 0.37 K/UL
MONOCYTES NFR BLD AUTO: 7.7 %
NEUTROPHILS # BLD AUTO: 2.59 K/UL
NEUTROPHILS NFR BLD AUTO: 54.2 %
PARATHYROID HORMONE INTACT: 583 PG/ML
PHOSPHATE SERPL-MCNC: 4.5 MG/DL
PLATELET # BLD AUTO: 239 K/UL
POTASSIUM SERPL-SCNC: 5.2 MMOL/L
PROT SERPL-MCNC: 6.3 G/DL
PROT UR-MCNC: 277 MG/DL
RBC # BLD: 3.5 M/UL
RBC # FLD: 13.9 %
SODIUM SERPL-SCNC: 140 MMOL/L
URATE SERPL-MCNC: 7.8 MG/DL
WBC # FLD AUTO: 4.79 K/UL

## 2022-07-19 ENCOUNTER — APPOINTMENT (OUTPATIENT)
Dept: NEPHROLOGY | Facility: CLINIC | Age: 37
End: 2022-07-19

## 2022-07-19 VITALS
OXYGEN SATURATION: 100 % | HEIGHT: 64 IN | BODY MASS INDEX: 36.7 KG/M2 | WEIGHT: 215 LBS | SYSTOLIC BLOOD PRESSURE: 115 MMHG | DIASTOLIC BLOOD PRESSURE: 78 MMHG | HEART RATE: 84 BPM

## 2022-07-19 VITALS — DIASTOLIC BLOOD PRESSURE: 81 MMHG | SYSTOLIC BLOOD PRESSURE: 119 MMHG

## 2022-07-19 DIAGNOSIS — I10 ESSENTIAL (PRIMARY) HYPERTENSION: ICD-10-CM

## 2022-07-19 DIAGNOSIS — E55.9 VITAMIN D DEFICIENCY, UNSPECIFIED: ICD-10-CM

## 2022-07-19 DIAGNOSIS — N18.4 CHRONIC KIDNEY DISEASE, STAGE 4 (SEVERE): ICD-10-CM

## 2022-07-19 DIAGNOSIS — N25.81 SECONDARY HYPERPARATHYROIDISM OF RENAL ORIGIN: ICD-10-CM

## 2022-07-19 DIAGNOSIS — E87.2 ACIDOSIS: ICD-10-CM

## 2022-07-19 PROCEDURE — 99215 OFFICE O/P EST HI 40 MIN: CPT

## 2022-07-19 RX ORDER — ALBUTEROL SULFATE 90 UG/1
108 (90 BASE) INHALANT RESPIRATORY (INHALATION) EVERY 6 HOURS
Qty: 1 | Refills: 0 | Status: ACTIVE | COMMUNITY
Start: 2022-07-19 | End: 1900-01-01

## 2022-07-19 RX ORDER — BUDESONIDE 90 UG/1
90 AEROSOL, POWDER RESPIRATORY (INHALATION)
Qty: 2 | Refills: 0 | Status: DISCONTINUED | COMMUNITY
Start: 2018-03-22 | End: 2022-07-19

## 2022-07-19 RX ORDER — SODIUM BICARBONATE 650 MG/1
650 TABLET ORAL 3 TIMES DAILY
Qty: 540 | Refills: 3 | Status: ACTIVE | COMMUNITY
Start: 2020-05-22 | End: 1900-01-01

## 2022-07-19 RX ORDER — CALCITRIOL 0.25 UG/1
0.25 CAPSULE, LIQUID FILLED ORAL
Qty: 90 | Refills: 3 | Status: ACTIVE | COMMUNITY
Start: 2022-07-19 | End: 1900-01-01

## 2022-07-20 NOTE — ASSESSMENT
[FreeTextEntry1] : progressed CKD -- secondary FSGS clinically , incl bx \par BP now well controlled \par K better\par met acidosis\par no uremic sx \par still high PTh and low calcium despite mostly repleted vit D\par \par increase nahco3 to 1300 TID \par cont BP meds \par cont vit D--add calcitriol 0.25 mcg qd \par advised txp eval now (could have donors) and discussed options for dialysis -- but do txp eval ASAP in case can get preemptive txp \par \par

## 2022-07-20 NOTE — HISTORY OF PRESENT ILLNESS
[FreeTextEntry1] : f/u CKD, HTN \par on coreg since last visit and ergocalciferol and vit D 3 \par BP has been good with home monitor--110-130/80s geenrally \par feeling well -no dizziness, headaches \par meds reviewed with pt --no changes except as above \par labs done and reviewed - see below\par PCP Dr Coats\par \par

## 2022-08-03 RX ORDER — LISINOPRIL 10 MG/1
10 TABLET ORAL
Qty: 90 | Refills: 0 | Status: ACTIVE | COMMUNITY
Start: 2019-12-11 | End: 1900-01-01

## 2022-09-21 ENCOUNTER — APPOINTMENT (OUTPATIENT)
Dept: NEPHROLOGY | Facility: CLINIC | Age: 37
End: 2022-09-21

## 2022-12-30 RX ORDER — CARVEDILOL 12.5 MG/1
12.5 TABLET, FILM COATED ORAL TWICE DAILY
Qty: 60 | Refills: 3 | Status: ACTIVE | COMMUNITY
Start: 2022-05-24 | End: 1900-01-01

## 2023-02-10 NOTE — PATIENT PROFILE ADULT - FUNCTIONAL SCREEN CURRENT LEVEL: BATHING, MLM
Caller: TRA    Relationship to patient: WIFE    Best call back number: [phone]    Patient is needing: TO RETURN CALL TO [place] ABOUT MARSHAL.   0 = independent

## 2023-06-08 ENCOUNTER — INPATIENT (INPATIENT)
Facility: HOSPITAL | Age: 38
LOS: 5 days | Discharge: HOME | DRG: 291 | End: 2023-06-14
Attending: INTERNAL MEDICINE | Admitting: STUDENT IN AN ORGANIZED HEALTH CARE EDUCATION/TRAINING PROGRAM
Payer: COMMERCIAL

## 2023-06-08 VITALS
TEMPERATURE: 97 F | SYSTOLIC BLOOD PRESSURE: 137 MMHG | OXYGEN SATURATION: 100 % | HEIGHT: 64 IN | DIASTOLIC BLOOD PRESSURE: 90 MMHG | HEART RATE: 94 BPM | RESPIRATION RATE: 18 BRPM | WEIGHT: 216.93 LBS

## 2023-06-08 DIAGNOSIS — Z98.891 HISTORY OF UTERINE SCAR FROM PREVIOUS SURGERY: Chronic | ICD-10-CM

## 2023-06-08 DIAGNOSIS — Z98.890 OTHER SPECIFIED POSTPROCEDURAL STATES: Chronic | ICD-10-CM

## 2023-06-08 LAB
A1C WITH ESTIMATED AVERAGE GLUCOSE RESULT: 5.1 % — SIGNIFICANT CHANGE UP (ref 4–5.6)
ALBUMIN SERPL ELPH-MCNC: 3.4 G/DL — SIGNIFICANT CHANGE UP (ref 3.3–5)
ALBUMIN SERPL ELPH-MCNC: 3.4 G/DL — SIGNIFICANT CHANGE UP (ref 3.3–5)
ALP SERPL-CCNC: 51 U/L — SIGNIFICANT CHANGE UP (ref 40–120)
ALP SERPL-CCNC: 54 U/L — SIGNIFICANT CHANGE UP (ref 40–120)
ALT FLD-CCNC: 11 U/L — SIGNIFICANT CHANGE UP (ref 10–45)
ALT FLD-CCNC: 9 U/L — LOW (ref 10–45)
ANION GAP SERPL CALC-SCNC: 17 MMOL/L — SIGNIFICANT CHANGE UP (ref 5–17)
ANION GAP SERPL CALC-SCNC: 19 MMOL/L — HIGH (ref 5–17)
ANION GAP SERPL CALC-SCNC: 21 MMOL/L — HIGH (ref 5–17)
APPEARANCE UR: CLEAR — SIGNIFICANT CHANGE UP
APTT BLD: 26.6 SEC — LOW (ref 27.5–35.5)
AST SERPL-CCNC: 10 U/L — SIGNIFICANT CHANGE UP (ref 10–40)
AST SERPL-CCNC: 14 U/L — SIGNIFICANT CHANGE UP (ref 10–40)
BASOPHILS # BLD AUTO: 0.01 K/UL — SIGNIFICANT CHANGE UP (ref 0–0.2)
BASOPHILS # BLD AUTO: 0.01 K/UL — SIGNIFICANT CHANGE UP (ref 0–0.2)
BASOPHILS NFR BLD AUTO: 0.2 % — SIGNIFICANT CHANGE UP (ref 0–2)
BASOPHILS NFR BLD AUTO: 0.2 % — SIGNIFICANT CHANGE UP (ref 0–2)
BILIRUB DIRECT SERPL-MCNC: 0.2 MG/DL — SIGNIFICANT CHANGE UP (ref 0–0.3)
BILIRUB INDIRECT FLD-MCNC: 0 MG/DL — LOW (ref 0.2–1)
BILIRUB SERPL-MCNC: 0.2 MG/DL — SIGNIFICANT CHANGE UP (ref 0.2–1.2)
BILIRUB SERPL-MCNC: 0.2 MG/DL — SIGNIFICANT CHANGE UP (ref 0.2–1.2)
BILIRUB UR-MCNC: NEGATIVE — SIGNIFICANT CHANGE UP
BUN SERPL-MCNC: 83 MG/DL — HIGH (ref 7–23)
BUN SERPL-MCNC: 94 MG/DL — HIGH (ref 7–23)
BUN SERPL-MCNC: 94 MG/DL — HIGH (ref 7–23)
CALCIUM SERPL-MCNC: 4.8 MG/DL — CRITICAL LOW (ref 8.4–10.5)
CALCIUM SERPL-MCNC: 5.3 MG/DL — CRITICAL LOW (ref 8.4–10.5)
CALCIUM SERPL-MCNC: 5.5 MG/DL — CRITICAL LOW (ref 8.4–10.5)
CHLORIDE SERPL-SCNC: 105 MMOL/L — SIGNIFICANT CHANGE UP (ref 96–108)
CHLORIDE SERPL-SCNC: 105 MMOL/L — SIGNIFICANT CHANGE UP (ref 96–108)
CHLORIDE SERPL-SCNC: 108 MMOL/L — SIGNIFICANT CHANGE UP (ref 96–108)
CHOLEST SERPL-MCNC: 113 MG/DL — SIGNIFICANT CHANGE UP
CO2 SERPL-SCNC: 15 MMOL/L — LOW (ref 22–31)
CO2 SERPL-SCNC: 17 MMOL/L — LOW (ref 22–31)
CO2 SERPL-SCNC: 18 MMOL/L — LOW (ref 22–31)
COLOR SPEC: YELLOW — SIGNIFICANT CHANGE UP
CREAT SERPL-MCNC: 15.69 MG/DL — HIGH (ref 0.5–1.3)
CREAT SERPL-MCNC: 16.98 MG/DL — HIGH (ref 0.5–1.3)
CREAT SERPL-MCNC: 17.61 MG/DL — HIGH (ref 0.5–1.3)
DIFF PNL FLD: ABNORMAL
EGFR: 2 ML/MIN/1.73M2 — LOW
EGFR: 2 ML/MIN/1.73M2 — LOW
EGFR: 3 ML/MIN/1.73M2 — LOW
EOSINOPHIL # BLD AUTO: 0.14 K/UL — SIGNIFICANT CHANGE UP (ref 0–0.5)
EOSINOPHIL # BLD AUTO: 0.17 K/UL — SIGNIFICANT CHANGE UP (ref 0–0.5)
EOSINOPHIL NFR BLD AUTO: 2.9 % — SIGNIFICANT CHANGE UP (ref 0–6)
EOSINOPHIL NFR BLD AUTO: 3.3 % — SIGNIFICANT CHANGE UP (ref 0–6)
ESTIMATED AVERAGE GLUCOSE: 100 MG/DL — SIGNIFICANT CHANGE UP (ref 68–114)
FERRITIN SERPL-MCNC: 95 NG/ML — SIGNIFICANT CHANGE UP (ref 15–150)
GLUCOSE SERPL-MCNC: 105 MG/DL — HIGH (ref 70–99)
GLUCOSE SERPL-MCNC: 87 MG/DL — SIGNIFICANT CHANGE UP (ref 70–99)
GLUCOSE SERPL-MCNC: 91 MG/DL — SIGNIFICANT CHANGE UP (ref 70–99)
GLUCOSE UR QL: NEGATIVE — SIGNIFICANT CHANGE UP
HBV CORE IGM SER-ACNC: SIGNIFICANT CHANGE UP
HBV SURFACE AB SER-ACNC: SIGNIFICANT CHANGE UP
HBV SURFACE AG SER-ACNC: SIGNIFICANT CHANGE UP
HCG SERPL-ACNC: 0 MIU/ML — SIGNIFICANT CHANGE UP
HCT VFR BLD CALC: 21.2 % — LOW (ref 34.5–45)
HCT VFR BLD CALC: 24.3 % — LOW (ref 34.5–45)
HCV AB S/CO SERPL IA: 0.04 S/CO — SIGNIFICANT CHANGE UP
HCV AB SERPL-IMP: SIGNIFICANT CHANGE UP
HDLC SERPL-MCNC: 51 MG/DL — SIGNIFICANT CHANGE UP
HGB BLD-MCNC: 7 G/DL — CRITICAL LOW (ref 11.5–15.5)
HGB BLD-MCNC: 7.9 G/DL — LOW (ref 11.5–15.5)
IMM GRANULOCYTES NFR BLD AUTO: 0.4 % — SIGNIFICANT CHANGE UP (ref 0–0.9)
IMM GRANULOCYTES NFR BLD AUTO: 0.4 % — SIGNIFICANT CHANGE UP (ref 0–0.9)
INR BLD: 1.05 — SIGNIFICANT CHANGE UP (ref 0.88–1.16)
IRON SATN MFR SERPL: 24 % — SIGNIFICANT CHANGE UP (ref 14–50)
IRON SATN MFR SERPL: 55 UG/DL — SIGNIFICANT CHANGE UP (ref 30–160)
KETONES UR-MCNC: NEGATIVE — SIGNIFICANT CHANGE UP
LEUKOCYTE ESTERASE UR-ACNC: ABNORMAL
LIPID PNL WITH DIRECT LDL SERPL: 46 MG/DL — SIGNIFICANT CHANGE UP
LYMPHOCYTES # BLD AUTO: 0.88 K/UL — LOW (ref 1–3.3)
LYMPHOCYTES # BLD AUTO: 1.08 K/UL — SIGNIFICANT CHANGE UP (ref 1–3.3)
LYMPHOCYTES # BLD AUTO: 17 % — SIGNIFICANT CHANGE UP (ref 13–44)
LYMPHOCYTES # BLD AUTO: 22.6 % — SIGNIFICANT CHANGE UP (ref 13–44)
MAGNESIUM SERPL-MCNC: 1.9 MG/DL — SIGNIFICANT CHANGE UP (ref 1.6–2.6)
MAGNESIUM SERPL-MCNC: 2 MG/DL — SIGNIFICANT CHANGE UP (ref 1.6–2.6)
MCHC RBC-ENTMCNC: 28.6 PG — SIGNIFICANT CHANGE UP (ref 27–34)
MCHC RBC-ENTMCNC: 28.8 PG — SIGNIFICANT CHANGE UP (ref 27–34)
MCHC RBC-ENTMCNC: 32.5 GM/DL — SIGNIFICANT CHANGE UP (ref 32–36)
MCHC RBC-ENTMCNC: 33 GM/DL — SIGNIFICANT CHANGE UP (ref 32–36)
MCV RBC AUTO: 87.2 FL — SIGNIFICANT CHANGE UP (ref 80–100)
MCV RBC AUTO: 88 FL — SIGNIFICANT CHANGE UP (ref 80–100)
MONOCYTES # BLD AUTO: 0.45 K/UL — SIGNIFICANT CHANGE UP (ref 0–0.9)
MONOCYTES # BLD AUTO: 0.51 K/UL — SIGNIFICANT CHANGE UP (ref 0–0.9)
MONOCYTES NFR BLD AUTO: 10.7 % — SIGNIFICANT CHANGE UP (ref 2–14)
MONOCYTES NFR BLD AUTO: 8.7 % — SIGNIFICANT CHANGE UP (ref 2–14)
NEUTROPHILS # BLD AUTO: 3.01 K/UL — SIGNIFICANT CHANGE UP (ref 1.8–7.4)
NEUTROPHILS # BLD AUTO: 3.64 K/UL — SIGNIFICANT CHANGE UP (ref 1.8–7.4)
NEUTROPHILS NFR BLD AUTO: 63.2 % — SIGNIFICANT CHANGE UP (ref 43–77)
NEUTROPHILS NFR BLD AUTO: 70.4 % — SIGNIFICANT CHANGE UP (ref 43–77)
NITRITE UR-MCNC: NEGATIVE — SIGNIFICANT CHANGE UP
NON HDL CHOLESTEROL: 62 MG/DL — SIGNIFICANT CHANGE UP
NRBC # BLD: 0 /100 WBCS — SIGNIFICANT CHANGE UP (ref 0–0)
NRBC # BLD: 0 /100 WBCS — SIGNIFICANT CHANGE UP (ref 0–0)
PH UR: 6.5 — SIGNIFICANT CHANGE UP (ref 5–8)
PHOSPHATE SERPL-MCNC: 6.7 MG/DL — HIGH (ref 2.5–4.5)
PHOSPHATE SERPL-MCNC: 7.8 MG/DL — HIGH (ref 2.5–4.5)
PLATELET # BLD AUTO: 195 K/UL — SIGNIFICANT CHANGE UP (ref 150–400)
PLATELET # BLD AUTO: 209 K/UL — SIGNIFICANT CHANGE UP (ref 150–400)
POTASSIUM SERPL-MCNC: 3.8 MMOL/L — SIGNIFICANT CHANGE UP (ref 3.5–5.3)
POTASSIUM SERPL-MCNC: 3.9 MMOL/L — SIGNIFICANT CHANGE UP (ref 3.5–5.3)
POTASSIUM SERPL-MCNC: SIGNIFICANT CHANGE UP (ref 3.5–5.3)
POTASSIUM SERPL-SCNC: 3.8 MMOL/L — SIGNIFICANT CHANGE UP (ref 3.5–5.3)
POTASSIUM SERPL-SCNC: 3.9 MMOL/L — SIGNIFICANT CHANGE UP (ref 3.5–5.3)
POTASSIUM SERPL-SCNC: SIGNIFICANT CHANGE UP (ref 3.5–5.3)
PROT SERPL-MCNC: 5.1 G/DL — LOW (ref 6–8.3)
PROT SERPL-MCNC: 5.6 G/DL — LOW (ref 6–8.3)
PROT UR-MCNC: >=300 MG/DL
PROTHROM AB SERPL-ACNC: 12.5 SEC — SIGNIFICANT CHANGE UP (ref 10.5–13.4)
RBC # BLD: 2.43 M/UL — LOW (ref 3.8–5.2)
RBC # BLD: 2.76 M/UL — LOW (ref 3.8–5.2)
RBC # FLD: 12.9 % — SIGNIFICANT CHANGE UP (ref 10.3–14.5)
RBC # FLD: 13.2 % — SIGNIFICANT CHANGE UP (ref 10.3–14.5)
RETICS #: 71.4 K/UL — SIGNIFICANT CHANGE UP (ref 25–125)
RETICS/RBC NFR: 3 % — HIGH (ref 0.5–2.5)
SODIUM SERPL-SCNC: 141 MMOL/L — SIGNIFICANT CHANGE UP (ref 135–145)
SODIUM SERPL-SCNC: 141 MMOL/L — SIGNIFICANT CHANGE UP (ref 135–145)
SODIUM SERPL-SCNC: 143 MMOL/L — SIGNIFICANT CHANGE UP (ref 135–145)
SP GR SPEC: 1.02 — SIGNIFICANT CHANGE UP (ref 1–1.03)
TIBC SERPL-MCNC: 226 UG/DL — SIGNIFICANT CHANGE UP (ref 220–430)
TRANSFERRIN SERPL-MCNC: 181 MG/DL — LOW (ref 200–360)
TRIGL SERPL-MCNC: 79 MG/DL — SIGNIFICANT CHANGE UP
UIBC SERPL-MCNC: 171 UG/DL — SIGNIFICANT CHANGE UP (ref 110–370)
UROBILINOGEN FLD QL: 0.2 E.U./DL — SIGNIFICANT CHANGE UP
WBC # BLD: 4.77 K/UL — SIGNIFICANT CHANGE UP (ref 3.8–10.5)
WBC # BLD: 5.17 K/UL — SIGNIFICANT CHANGE UP (ref 3.8–10.5)
WBC # FLD AUTO: 4.77 K/UL — SIGNIFICANT CHANGE UP (ref 3.8–10.5)
WBC # FLD AUTO: 5.17 K/UL — SIGNIFICANT CHANGE UP (ref 3.8–10.5)

## 2023-06-08 PROCEDURE — 99222 1ST HOSP IP/OBS MODERATE 55: CPT

## 2023-06-08 PROCEDURE — 99291 CRITICAL CARE FIRST HOUR: CPT

## 2023-06-08 PROCEDURE — 99223 1ST HOSP IP/OBS HIGH 75: CPT | Mod: GC

## 2023-06-08 RX ORDER — LANOLIN ALCOHOL/MO/W.PET/CERES
3 CREAM (GRAM) TOPICAL AT BEDTIME
Refills: 0 | Status: DISCONTINUED | OUTPATIENT
Start: 2023-06-08 | End: 2023-06-14

## 2023-06-08 RX ORDER — CALCIUM GLUCONATE 100 MG/ML
1 VIAL (ML) INTRAVENOUS ONCE
Refills: 0 | Status: COMPLETED | OUTPATIENT
Start: 2023-06-08 | End: 2023-06-08

## 2023-06-08 RX ORDER — ACETAMINOPHEN 500 MG
650 TABLET ORAL EVERY 6 HOURS
Refills: 0 | Status: DISCONTINUED | OUTPATIENT
Start: 2023-06-08 | End: 2023-06-14

## 2023-06-08 RX ORDER — CARVEDILOL PHOSPHATE 80 MG/1
12.5 CAPSULE, EXTENDED RELEASE ORAL EVERY 12 HOURS
Refills: 0 | Status: DISCONTINUED | OUTPATIENT
Start: 2023-06-08 | End: 2023-06-14

## 2023-06-08 RX ORDER — ONDANSETRON 8 MG/1
4 TABLET, FILM COATED ORAL EVERY 8 HOURS
Refills: 0 | Status: DISCONTINUED | OUTPATIENT
Start: 2023-06-08 | End: 2023-06-14

## 2023-06-08 RX ORDER — CALCIUM ACETATE 667 MG
1334 TABLET ORAL
Refills: 0 | Status: DISCONTINUED | OUTPATIENT
Start: 2023-06-08 | End: 2023-06-14

## 2023-06-08 RX ORDER — AMLODIPINE BESYLATE 2.5 MG/1
10 TABLET ORAL DAILY
Refills: 0 | Status: DISCONTINUED | OUTPATIENT
Start: 2023-06-08 | End: 2023-06-14

## 2023-06-08 RX ORDER — CALCIUM GLUCONATE 100 MG/ML
2 VIAL (ML) INTRAVENOUS ONCE
Refills: 0 | Status: COMPLETED | OUTPATIENT
Start: 2023-06-08 | End: 2023-06-08

## 2023-06-08 RX ORDER — LISINOPRIL 2.5 MG/1
10 TABLET ORAL DAILY
Refills: 0 | Status: DISCONTINUED | OUTPATIENT
Start: 2023-06-08 | End: 2023-06-14

## 2023-06-08 RX ADMIN — CARVEDILOL PHOSPHATE 12.5 MILLIGRAM(S): 80 CAPSULE, EXTENDED RELEASE ORAL at 21:50

## 2023-06-08 RX ADMIN — Medication 100 GRAM(S): at 17:27

## 2023-06-08 RX ADMIN — Medication 100 GRAM(S): at 19:37

## 2023-06-08 NOTE — H&P ADULT - PROBLEM SELECTOR PLAN 8
F: tolerating PO, no IVF  E: replete K<4, Mg<1.8, Phos<3, Ca<8.5   N: renal diet, NPO after midnight    VTE Prophylaxis: hold for HD cath placement  GI: not needed  C: Full Code  D: 7Lach

## 2023-06-08 NOTE — ED ADULT NURSE NOTE - OBJECTIVE STATEMENT
36yo F with pmh of CKD stage 4, has not began HD, states she was told to come to the ER for abnormal kidney function. denies flank pain, urine discomfort, sob, chest pain

## 2023-06-08 NOTE — H&P ADULT - PROBLEM SELECTOR PLAN 7
Patient with 1 month history of diarrhea, associated with eating. H/o bariatric surgery. No significant changes today. No infectious symptoms.  - monitor BMs  - nutrition consult

## 2023-06-08 NOTE — H&P ADULT - PROBLEM SELECTOR PLAN 1
Patient found to have hypocalcemia to 5.3 on admission with EKG changes (prolonged QTc), admitted to telemetry. Likely 2/2 worsening renal failure.  - s/p 1g Ca x2  Plan:  - 2g calcium IV  - f/u repeat Ca in AM  - phoslo TID  - monitor EKGs Patient found to have hypocalcemia to 5.3 on admission with EKG changes (prolonged QTc), admitted to telemetry. Likely 2/2 worsening renal failure.  - s/p 1g Ca x2  Plan:  - 2g calcium IV  - f/u repeat Ca in AM  - phoslo TID  - monitor EKGs  - f/u PTH, vit D studies

## 2023-06-08 NOTE — CONSULT NOTE ADULT - ATTENDING COMMENTS
uremia, severe hypocalcemia with prolonged QTC, start on Ca drip, calcitriol, Ca supplements, NPO midnight for tunneled HD catheter placement to start dialysis

## 2023-06-08 NOTE — H&P ADULT - NSHPREVIEWOFSYSTEMS_GEN_ALL_CORE
Review of Systems:  GENERAL: No fever, night sweats, chills, changes in weight, +fatigue  HEENT: No headache, changes in vision, double vision  CARDIOVASCULAR: No chest pain, palpitations, +dyspnea on exertion, no syncope, no lower extremity edema  PULMONARY: No chest pain, shortness of breath, cough  SKIN/HAIR: No rash, ulcerations, itching, hair loss  GASTROINTESTINAL: +abdominal pain, +nausea, +vomiting, +diarrhea, no hematochezia/melena, hematenesis, anorexia  GENITOURINARY: No dysuria, urinary urgency, hematuria or bladder incontinence.  MUSCULOSKELETAL: No arthralgias, muscle pain  ENDOCRINOLOGIC: +cold intolerance, no weight change  NEUROLOGIC: No headache, + weakness, no dizziness

## 2023-06-08 NOTE — H&P ADULT - NSHPSOCIALHISTORY_GEN_ALL_CORE
Living:  Functional status:  Occupation:  Tobacco use:   EtOH use:  Illicit drug use:  Sexual History: Functional status: independent  Occupation: not currently working  Tobacco use: denies lifetime  EtOH use: denies lifetime  Illicit drug use: denies lifetime

## 2023-06-08 NOTE — ED PROVIDER NOTE - CARE PLAN
Principal Discharge DX:	Renal failure, acute on chronic  Secondary Diagnosis:	Hypocalcemia  Secondary Diagnosis:	Prolonged QT interval   1

## 2023-06-08 NOTE — H&P ADULT - PROBLEM SELECTOR PLAN 2
#New HD initiation  Patient presenting from outpatient PCP provider with elevated Cr and BUN for dialysis initiation. Nephrology consulted. On admission Cr elevated to 16.98 w/ BUN 94. Sxs of fatigue. 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 Neves. Home mediations: bicarb 650 TID, calcitriol 0.25 daily, vit D2 50K units weekly  - f/u nephro recs  - IR for permacath  - vascular consult in AM for vein mapping  - f/u quant   - c/w home bicarb and calcitriol  - hold vit D2 50K inpatient, restart as outpatient.  - renal diet  - social work consult for HD unit

## 2023-06-08 NOTE — H&P ADULT - PROBLEM SELECTOR PLAN 5
Home medications: lisinopril 10mg daily, coreg 12.5 BID, amlodipine 10mg daily. Per patient has not been taking lisinopril because ran out and was having difficulties with insurance.  - c/w home meds

## 2023-06-08 NOTE — ED ADULT NURSE NOTE - NSFALLUNIVINTERV_ED_ALL_ED
Bed/Stretcher in lowest position, wheels locked, appropriate side rails in place/Call bell, personal items and telephone in reach/Instruct patient to call for assistance before getting out of bed/chair/stretcher/Non-slip footwear applied when patient is off stretcher/[place] to call system/Physically safe environment - no spills, clutter or unnecessary equipment/Purposeful proactive rounding/Room/bathroom lighting operational, light cord in reach

## 2023-06-08 NOTE — H&P ADULT - ASSESSMENT
37F PMH of HTN, asthma, CKD Stage 4 not on HD (baseline Cr 2.92), PSH lap paloma, , bariatric sleeve gastrectomy (2019) presents from outpatient PCP for worsening renal function, admitted for HD initiation, on telemetry for hypocalcemia w/ EKG changes.

## 2023-06-08 NOTE — CONSULT NOTE ADULT - ASSESSMENT
37F with pmhx of Advanced CKD likely 2/2 secondary FSGS, HTN, Asthma who presents to the ER in setting of requiring new start HD.    Assessment/Plan:   #New Start HD  Pt with known advanced CKD, presenting to the ER in setting of uremic symptoms and sent in by her PMD. Renal failure presumably from secondary FSGS, followed by Dr. Neves  -Please consult IR early in morning for patient to get permacath, will start HD tomorrow  -K reviewed 3.9  -Renal diet  -Please obtain pre-op labs  -Please make NPO after midnight  -SW consult for HD unit placement    #HTN   BP at goal   continue with amlodipine 10mg Qday  c/w coreg 12.5mg BID  C/w lisinpril 10mg Qday    #access   to get permacath in AM with IR  Please consult vascular for vein mapping    #anemia  Hgb not at goal  check iron profile and ferritin    #renal bone disease   Ca ~4.8  -Please give 2g calcium gluconate STAT and repeat at 8PM.  -Please check Vitamin D levels and PTH  -Check phos.     Thank you for the opportunity to participate in the care of your patient. The nephrology service remains available to assist with any questions or concerns. Please feel free to reach us by paging the on-call nephrology fellow for urgent issues or as below.     Uche Glass D.O.  PGY-4, Nephrology Fellow    P: [phone]    37F with pmhx of Advanced CKD likely 2/2 secondary FSGS, HTN, Asthma who presents to the ER in setting of requiring new start HD.    Assessment/Plan:   #New Start HD  Pt with known advanced CKD, presenting to the ER in setting of uremic symptoms and sent in by her PMD. Renal failure presumably from secondary FSGS, followed by Dr. Neves  -Please consult IR early in morning for patient to get permacath, will start HD tomorrow  -K reviewed 3.9  -Renal diet  -Please obtain pre-op labs  -Please make NPO after midnight  -SW consult for HD unit placement    #HTN   BP at goal   continue with amlodipine 10mg Qday  c/w coreg 12.5mg BID  C/w lisinpril 10mg Qday    #access   to get permacath in AM with IR  Please consult vascular for vein mapping    #anemia  Hgb not at goal  check iron profile and ferritin    #renal bone disease   Ca ~4.8  -Please give 2g calcium gluconate STAT and repeat at 8PM.  -Tele bed  -Please check Vitamin D levels and PTH  -Check phos.     Thank you for the opportunity to participate in the care of your patient. The nephrology service remains available to assist with any questions or concerns. Please feel free to reach us by paging the on-call nephrology fellow for urgent issues or as below.     Uche Glass D.O.  PGY-5, Nephrology Fellow    P: [phone]

## 2023-06-08 NOTE — PATIENT PROFILE ADULT - FALL HARM RISK - HARM RISK INTERVENTIONS

## 2023-06-08 NOTE — CONSULT NOTE ADULT - ASSESSMENT
37y Female PMHx of CKD, HTN and anemia here with worsening renal function. ICU consulted for hypocalcemia with ECG changes.     #Hypocalcemia with ECG changes  #New Start HD  Hx of CKD, sent in by her PMD. followed by Dr. Neves, calcium 4.8 on admission, ECG with prolonged QTc to 510.   -Renal consulted by the ED planning for dialysis in the AM  -Recommending IR for permacath  -S/p 2g calcium gluconate in the ED  -Please repeat BMP at 8pm, give additional Ca as needed  -check Vitamin D levels and PTH  -Renal diet  -NPO after midnight    #HTN   BP at goal   continue with home amlodipine 10mg, coreg 12.5mg BID and lisinpril 10mg qd    #anemia  Hgb not at goal, denies any hematuria/ hematochezia  - iron profile and ferritin  - active T&S  - transfuse for hgb < 7    Dispo: 7L

## 2023-06-08 NOTE — H&P ADULT - NSHPPHYSICALEXAM_GEN_ALL_CORE
.  VITAL SIGNS:  T(C): 36.7 (06-08-23 @ 23:01), Max: 37.1 (06-08-23 @ 17:05)  T(F): 98.1 (06-08-23 @ 23:01), Max: 98.7 (06-08-23 @ 17:05)  HR: 84 (06-08-23 @ 20:55) (78 - 94)  BP: 131/78 (06-08-23 @ 20:55) (131/78 - 142/90)  BP(mean): --  RR: 18 (06-08-23 @ 20:55) (18 - 18)  SpO2: 100% (06-08-23 @ 20:55) (100% - 100%)  Wt(kg): --    PHYSICAL EXAM:    Constitutional: Resting comfortably in bed; NAD  Head: NC/AT  Eyes: EOMI, anicteric sclera  ENT: no nasal discharge; uvula midline, no oropharyngeal erythema or exudates; MMM  Neck: supple; no JVD or thyromegaly  Respiratory: clear to auscultation bilaterally; no wheezes, ronchi, increased work of breathing, retractions  Cardiac: RRR; normal S1/S2, no MRG, no LE edema  Gastrointestinal: +BSx4, abdomen soft, NT/ND; no rebound or guarding  Genitourinary: no suprapubic tenderness; araiza*; normal external genitalia  Extremities: WWP, no clubbing or cyanosis; no peripheral edema  Musculoskeletal: NROM x4; no joint swelling, tenderness or erythema  Vascular: 2+ radial, femoral, DP pulses bilaterally  Dermatologic: skin warm, dry and intact; no rashes, open wounds  Neurologic: AAOx3; CNII-XII grossly intact; no focal deficits  Psychiatric: affect and characteristics of appearance, verbalizations, behaviors are appropriate    Back: spine midline, no bony tenderness or step-offs; no CVAT B/L  Lymphatic: no submandibular or cervical LAD .  VITAL SIGNS:  T(C): 36.7 (06-08-23 @ 23:01), Max: 37.1 (06-08-23 @ 17:05)  T(F): 98.1 (06-08-23 @ 23:01), Max: 98.7 (06-08-23 @ 17:05)  HR: 84 (06-08-23 @ 20:55) (78 - 94)  BP: 131/78 (06-08-23 @ 20:55) (131/78 - 142/90)  BP(mean): --  RR: 18 (06-08-23 @ 20:55) (18 - 18)  SpO2: 100% (06-08-23 @ 20:55) (100% - 100%)  Wt(kg): --    PHYSICAL EXAM:    Constitutional: Resting comfortably in bed; NAD  Head: NC/AT  Eyes: EOMI, anicteric sclera  ENT: no nasal discharge; MMM  Neck: supple  Respiratory: clear to auscultation bilaterally; no wheezes, ronchi, increased work of breathing, retractions  Cardiac: RRR; normal S1/S2, no MRG, no LE edema  Gastrointestinal: +BSx4, abdomen soft, NT/ND; no rebound or guarding  Extremities: WWPx4; no peripheral edema  Musculoskeletal: NROM x4  Vascular: 2+ radial pulses bilaterally  Dermatologic: skin warm, dry and intact; no rashes, open wounds  Neurologic: AAOx3; CNII-XII grossly intact; no focal deficits

## 2023-06-08 NOTE — CONSULT NOTE ADULT - SUBJECTIVE AND OBJECTIVE BOX
HPI:   37y Female PMHx of CKD 2/2 FSGS, HTN, Asthma and anemia here with worsening renal function. Reports seeing pmd and having blood work done 2 weeks ago. Called today that it was abnormal and she should come to ER. Notes feeling weak/tired/nausea recently. Denies pain, sob, fever, chills. Taking meds as prescribed. Last saw her renal doctor about a year ago. ICU consulted for hypocalcemia with ECG changes.     Allergies  No Known Allergies    Home Medications:  metoprolol succinate 25 mg oral tablet, extended release: 1 tab(s) orally once a day (23 Sep 2019 12:33)  Norvasc 10 mg oral tablet: 1 tab(s) orally once a day (23 Sep 2019 12:33)  Symbicort 80 mcg-4.5 mcg/inh inhalation aerosol: 2 puff(s) inhaled 2 times a day (23 Sep 2019 12:33)  Ventolin HFA 90 mcg/inh inhalation aerosol: 2 puff(s) inhaled 4 times a day, As Needed (23 Sep 2019 12:33)  Vitamin D2 50,000 intl units (1.25 mg) oral capsule: 1 cap(s) orally once a week (23 Sep 2019 12:33)      PAST MEDICAL & SURGICAL HISTORY:  Asthma  HTN (hypertension)  Morbid obesity  Nephropathy  History of cholecystectomy  H/O:  section      FAMILY HISTORY:  No known cardiovascular or pulmonary family history     ROS:  See HPI     PHYSICAL EXAM    ICU Vital Signs Last 24 Hrs  T(C): 37.1 (2023 17:05), Max: 37.1 (2023 17:05)  T(F): 98.7 (2023 17:05), Max: 98.7 (2023 17:05)  HR: 78 (2023 17:05) (78 - 94)  BP: 142/90 (2023 17:05) (137/90 - 142/90)  BP(mean): --  ABP: --  ABP(mean): --  RR: 18 (2023 17:05) (18 - 18)  SpO2: 100% (2023 17:05) (100% - 100%)    O2 Parameters below as of 2023 17:05  Patient On (Oxygen Delivery Method): room air    General: Not in distress  HEENT:  KIMBERLI              Lymphatic system: No LN  Lungs: Bilateral BS  Cardiovascular: Regular  Gastrointestinal: Soft, Positive BS  Musculoskeletal: No clubbing.  Moves all extremities.    Skin: Warm.  Intact  Neurological: No motor or sensory deficit     LABS:                          7.9    5.17  )-----------( 209      ( 2023 14:52 )             24.3                                               06-08    141  |  105  |  83<H>  ----------------------------<  87  3.9   |  15<L>  |  15.69<H>    Ca    4.8<LL>      2023 16:22  Phos  7.8     06-08  Mg     2.0     06-08    TPro  5.6<L>  /  Alb  3.4  /  TBili  0.2  /  DBili  x   /  AST  10  /  ALT  9<L>  /  AlkPhos  54  06-08    Urinalysis Basic - ( 2023 15:23 )    Color: Yellow / Appearance: Clear / S.020 / pH: x  Gluc: x / Ketone: NEGATIVE  / Bili: Negative / Urobili: 0.2 E.U./dL   Blood: x / Protein: >=300 mg/dL / Nitrite: NEGATIVE   Leuk Esterase: Trace / RBC: 5-10 /HPF / WBC > 10 /HPF   Sq Epi: x / Non Sq Epi: x / Bacteria: Present /HPF    LIVER FUNCTIONS - ( 2023 16:22 )  Alb: 3.4 g/dL / Pro: 5.6 g/dL / ALK PHOS: 54 U/L / ALT: 9 U/L / AST: 10 U/L / GGT: x                                                  Urinalysis with Rflx Culture (collected 2023 15:23)                                                                                               CXR:    ECHO:    MEDICATIONS  (STANDING):    MEDICATIONS  (PRN):

## 2023-06-08 NOTE — H&P ADULT - PROBLEM SELECTOR PLAN 6
Home medications: symbicort 160/4.5 2 puffs BID, levalbuterol tartrate HFA 45mcg PRN  - c/w home meds

## 2023-06-08 NOTE — H&P ADULT - NSHPLABSRESULTS_GEN_ALL_CORE
LABS:                         7.0    4.77  )-----------( 195      ( 2023 20:55 )             21.2     06-08    143  |  108  |  94<H>  ----------------------------<  105<H>  3.8   |  18<L>  |  17.61<H>    Ca    5.5<LL>      2023 20:52  Phos  6.7     06-08  Mg     1.9     06-08    TPro  5.1<L>  /  Alb  3.4  /  TBili  0.2  /  DBili  0.2  /  AST  14  /  ALT  11  /  AlkPhos  51  06-08    PT/INR - ( 2023 20:55 )   PT: 12.5 sec;   INR: 1.05          PTT - ( 2023 20:55 )  PTT:26.6 sec  Urinalysis Basic - ( 2023 15:23 )    Color: Yellow / Appearance: Clear / S.020 / pH: x  Gluc: x / Ketone: NEGATIVE  / Bili: Negative / Urobili: 0.2 E.U./dL   Blood: x / Protein: >=300 mg/dL / Nitrite: NEGATIVE   Leuk Esterase: Trace / RBC: 5-10 /HPF / WBC > 10 /HPF   Sq Epi: x / Non Sq Epi: x / Bacteria: Present /HPF                RADIOLOGY, EKG & ADDITIONAL TESTS: Reviewed.

## 2023-06-08 NOTE — H&P ADULT - PROBLEM SELECTOR PLAN 4
Elevated BNP    - f/u TTE Elevated BNP to 42K. Patient with renal failure which may elevate BNP, however will r/o heart failure.  - f/u TTE

## 2023-06-08 NOTE — H&P ADULT - PROBLEM SELECTOR PLAN 3
Patient with Hb of 7.9 on admission. Symptomatic with worsening fatigue and PIMENTEL for 4 months. No known history of anemia. Patient currently menstruating and endorses abnormal/increased bleeding since Sept 2022.   - f/u collateral for baseline Hb  - Fe studies Patient with Hb of 7.9 on admission. Symptomatic with worsening fatigue and PIMENTEL for 4 months. No known history of anemia. Patient currently menstruating and endorses abnormal/increased bleeding since Sept 2022.  Otherwise no other active sources of bleeding. Never had colonoscopy.  - transfuse 1 unit pRBCs  - f/u post transfusion CBC  - f/u collateral for baseline Hb  - Fe studies  - monitor H+H  - maintain active T+S  - PT consulted

## 2023-06-08 NOTE — CONSULT NOTE ADULT - SUBJECTIVE AND OBJECTIVE BOX
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 Was previously seen by Dr. Neves a few months ago and was advised that she would need to pursue either transplant or start HD in the near future. Patient went to her PMD and labs were checked, she was called and told to report to the ER. She endorses feeling very tired and has had loss in appetite as well as nausea and vomiting. Also endorses having shakiness. Nephrology consulted for dialysis.     PAST MEDICAL & SURGICAL HISTORY:  Asthma      HTN (hypertension)      Morbid obesity      Nephropathy      History of cholecystectomy      H/O:  section      Allergies:  No Known Allergies      Home Medications:   metoprolol succinate 25 mg oral tablet, extended release: 1 tab(s) orally once a day  Norvasc 10 mg oral tablet: 1 tab(s) orally once a day  omeprazole 40 mg oral delayed release capsule: 1 cap(s) orally once a day MDD:1  Percocet 5/325 oral tablet: 1 tab(s) orally every 6 hours, As Needed -for severe pain MDD:4 tablets   Symbicort 80 mcg-4.5 mcg/inh inhalation aerosol: 2 puff(s) inhaled 2 times a day  Ventolin HFA 90 mcg/inh inhalation aerosol: 2 puff(s) inhaled 4 times a day, As Needed  Vitamin D2 50,000 intl units (1.25 mg) oral capsule: 1 cap(s) orally once a week      Hospital Medications:   MEDICATIONS  (STANDING):      SOCIAL HISTORY:  Denies ETOh, Smoking,     Family History:  FAMILY HISTORY:  non-contributory      VITALS:  T(F): 98.7 (23 @ 17:05), Max: 98.7 (23 @ 17:05)  HR: 78 (23 @ 17:05)  BP: 142/90 (23 @ 17:05)  RR: 18 (23 @ 17:05)  SpO2: 100% (23 @ 17:05)  Wt(kg): --    Height (cm): 162.6 ( @ 13:07)  Weight (kg): 98.4 ( @ :07)  BMI (kg/m2): 37.2 (06-08 @ 13:07)  BSA (m2): 2.03 (-08 @ 13:07)  CAPILLARY BLOOD GLUCOSE    Review of Systems:  ROS negative except as per HPI    PHYSICAL EXAM:  GENERAL: Alert, awake, oriented x3 on RA   HEENT: KIMBERLI, EOMI, neck supple, no JVP  CHEST/LUNG: Bilateral clear breath sounds  HEART: Regular rate and rhythm, no murmur, no gallops, no rub   ABDOMEN: Soft, nontender, non distended  : No flank or supra pubic tenderness.  EXTREMITIES: 1+ pitting edema  Neurology: AAOx3, mild asterixis   SKIN: No rash or skin lesion     LABS:      141  |  105  |  83<H>  ----------------------------<  87  3.9   |  15<L>  |  15.69<H>    Ca    4.8<LL>      2023 16:22  Phos  7.8     06-08  Mg     2.0     06-08    TPro  5.6<L>  /  Alb  3.4  /  TBili  0.2  /  DBili      /  AST  10  /  ALT  9<L>  /  AlkPhos  54  06-08    Creatinine Trend: 15.69 <--, 16.98 <--                        7.9    5.17  )-----------( 209      ( 2023 14:52 )             24.3     Urine Studies:  Urinalysis Basic - ( 2023 15:23 )    Color: Yellow / Appearance: Clear / S.020 / pH:   Gluc:  / Ketone: NEGATIVE  / Bili: Negative / Urobili: 0.2 E.U./dL   Blood:  / Protein: >=300 mg/dL / Nitrite: NEGATIVE   Leuk Esterase: Trace / RBC: 5-10 /HPF / WBC > 10 /HPF   Sq Epi:  / Non Sq Epi:  / Bacteria: Present /HPF

## 2023-06-08 NOTE — H&P ADULT - HISTORY OF PRESENT ILLNESS
34 F PMH of HTN, asthma, CKD Stage 4 not on HD (baseline Cr 2.92), PSH lap paloma and , presents today elective bariatric diet. : robotic assisted lap sleeve gastrectomy. Pt tolerated the procedure well. Patient outpatient nephrologist consulted for Cr. At time of discharge, pt was tolerating a bariatric clear liquid diet, and pt's pain was controlled. Plan is to follow up with Dr. Childress in the office.     34 F PMH of HTN, asthma, CKD Stage 4 not on HD (baseline Cr 2.92), PSH lap paloma and , presents today elective bariatric diet. : robotic assisted lap sleeve gastrectomy. Pt tolerated the procedure well. Patient outpatient nephrologist consulted for Cr. At time of discharge, pt was tolerating a bariatric clear liquid diet, and pt's pain was controlled. Plan is to follow up with Dr. Childress in the office.    Following up with her primary care doctor and. emergency room and should start dialysis. PCP: Dr. Garcia ([place]). Feeling weakness, nausea, vomiting (dry heaving). Food tastes bad. N/v; Fatigue x4 months, worse walking up and down stairs. N/v occurs once a week.     Patient also complaining of sneezing, pain in abdomen or when lifts heavy, feels like she has to hold it. no bulging.    Has not seen a doctor for 2 years prior. Periods 2022. No family history of polyps or fibroids. One child C section. Bariatric 2019.    Feels cold at all time. Recently been feeling short of breath. No known history of anemia. No dizziness/lightheadedness. No falls. No hematuria/melena/hematochezia/hematemesis. Never had a colonoscopy. Diarrhea if eat.   37F PMH of HTN, asthma, CKD Stage 4 not on HD (baseline Cr 2.92), PSH lap paloma, , bariatric sleeve gastrectomy (2019) presents from outpatient PCP for abnormal labs. Patient states that for the last four months she has been experiencing worsening fatigue as well as intermittent nausea/vomiting. Her fatigue is worse when walking up/down stairs and also endorses dyspnea on exertion. Nausea vomiting occurs about once a week, mostly dry heaving and states food tastes bad in her mouth. No hematemesis. Has not experienced these symptoms before. Patient went to her primary care doctor Following up with her primary care doctor and. emergency room and should start dialysis. PCP: Dr. Garcia ([place]). Feeling weakness, nausea, vomiting (dry heaving). Food tastes bad. N/v; Fatigue x4 months, worse walking up and down stairs. N/v occurs once a week.     Patient also complaining of sneezing, pain in abdomen or when lifts heavy, feels like she has to hold it. no bulging.    Has not seen a doctor for 2 years prior. Periods 2022. No family history of polyps or fibroids. One child C section. Bariatric 2019.    Feels cold at all time. Recently been feeling short of breath. No known history of anemia. No dizziness/lightheadedness. No falls. No hematuria/melena/hematochezia/hematemesis. Never had a colonoscopy. Diarrhea if eat.   37F PMH of HTN, asthma, CKD Stage 4 not on HD (baseline Cr 2.92), PSH lap paloma, , bariatric sleeve gastrectomy (2019) presents from outpatient PCP for abnormal labs. Patient states that she went to her PCP (Dr. Garcia) who stated she had abnormal labs including uremia, and she was told to come to the ED for dialysis initiation. Patient had not seen her PCP for over 2 years prior to this visit due to issues with insurance. Pt also states that for the last four months she has been experiencing worsening fatigue as well as intermittent nausea/vomiting. Her fatigue is worse when walking up/down stairs and also endorses dyspnea on exertion. Nausea vomiting occurs about once a week, mostly dry heaving and states food tastes bad in her mouth. No hematemesis. Has not experienced these symptoms before. No lightheadedness, dizziness, syncope, falls. No known history of anemia, unknown baseline Hb. No hematuria/melena/hematochezia. Pt currently on her period, has had new history     Patient also complaining of sneezing, pain in abdomen or when lifts heavy, feels like she has to hold it. no bulging.    Has not seen a doctor for 2 years prior. Periods 2022. No family history of polyps or fibroids. One child C section. Bariatric 2019.    Feels cold at all time. Recently been feeling short of breath. No known history of anemia. No dizziness/lightheadedness. No falls. No hematuria/melena/hematochezia/hematemesis. Never had a colonoscopy. Diarrhea if eat.   37F PMH of HTN, asthma, CKD Stage 4 not on HD (baseline Cr 2.92), PSH lap paloma, , bariatric sleeve gastrectomy (2019) presents from outpatient PCP for abnormal labs. Patient states that she went to her PCP (Dr. Garcia) who stated she had abnormal labs including uremia, and she was told to come to the ED for dialysis initiation. Patient had not seen her PCP for over 2 years prior to this visit due to issues with insurance. Pt also states that for the last four months she has been experiencing worsening fatigue as well as intermittent nausea/vomiting. Her fatigue is worse when walking up/down stairs and also endorses dyspnea on exertion. Nausea vomiting occurs about once a week, mostly dry heaving and states food tastes bad in her mouth. No hematemesis. Has not experienced these symptoms before. No lightheadedness, dizziness, syncope, falls. No known history of anemia, unknown baseline Hb. No hematuria/melena/hematochezia. Never had a colonoscopy. Pt currently on her period, has had new history irregular, more frequent periods starting this past September. Periods last 4-5 days, uses about 4 pads a day, regular flow. No family history of polyps or fibroids.     Patient also has had symptoms of bulging belly when she sneezes or lifts something heavy. She feels like she has to hold her belly in. On ROS patient also endorses feeling cold all the time and diarrhea for the last month whenever she eats. No significant changes to diet.

## 2023-06-08 NOTE — H&P ADULT - ATTENDING COMMENTS
Patient is a 36yo Female PMHx of CKD, HTN and anemia here with worsening renal function. ICU consulted for hypocalcemia with ECG changes, prolonged QTc. Given calcium gluconate in ED with minimal improvement. Nephology consulted, will order IR permacath tomorrow and she will undergo dialysis. Will supplement additional calcium as needed, start phos binder, send workup. Presenting with anemia without any signs of acute bleeding, likely due to decreased EPO in renal failure. Additional workup and management as documented by resident above.

## 2023-06-08 NOTE — ED PROVIDER NOTE - OBJECTIVE STATEMENT
After Visit Summary   8/2/2018    Andrew Lockwood    MRN: [id number]           Patient Information     Date Of Birth          11/27/1965        Visit Information        Provider Department      8/2/2018 10:30 AM Keesha Zelaya, [facility] and Infectious Diseases MTM        Today's Diagnoses     Human immunodeficiency virus I infection (H)    -  1    Type 2 diabetes mellitus without complication, without long-term current use of insulin (H)          Care Instructions    Recommendations from today's MTM visit:                                                      1) No considerations for medication changes today.     2) PHQ-9 today.    Considerations from last MTM appt from 07/27/18:    1) PCP/Endo may consider starting Aspirin 81 mg daily.     2) PCP/Endo may consider starting a high density statin.      3) Diabetic foot exam due.      Next MTM visit: one week    To schedule another MTM appointment, please call the clinic directly or you may call the MTM scheduling line at [phone] or toll-free at [phone].     My Clinical Pharmacist's contact information:                                                      It was a pleasure seeing you today!  Please feel free to contact me with any questions or concerns you have.      Keesha Zelaya, [facility] Pharmacist.   [phone]      You may receive a survey about the MT services you received.  I would appreciate your feedback to help me serve you better in the future. Please fill it out and return it when you can. Your comments will be anonymous.      My healthcare goals:                                                      Stay undetectable and take medications every day.                 Follow-ups after your visit        Your next 10 appointments already scheduled     Aug 07, 2018 11:00 AM CDT   (Arrive by 10:45 AM)   RETURN DIABETES with Lenora Haley PA-C   [facility] Endocrinology ([facility])    [address] [place] [address]  [address]  3rd Floor  [facility] [address]   [phone]            Aug 08, 2018  3:00 PM CDT   (Arrive by 2:45 PM)   New Patient Visit with Judson Allen MD   [facility] Sports Medicine ([facility])    [address] [place] [address]  5th Floor  [facility] [address]   [phone]            Aug 09, 2018 10:30 AM CDT   (Arrive by 10:15 AM)   Office Visit with Keesha Zelaya RPH   [facility] and Infectious Diseases [facility] ([facility])    [address] [place] [address]  [address]  [facility] [address] [phone]           Bring a current list of meds and any records pertaining to this visit. For Physicals, please bring immunization records and any forms needing to be filled out. Please arrive 10 minutes early to complete paperwork.            Aug 15, 2018  1:00 PM CDT   (Arrive by 12:45 PM)   Return Visit with Florence Carbajal MD   [facility] and Infectious Diseases ([facility])    [address] [place] [address]  [address]  [facility] [address] [phone]            Aug 21, 2018  3:00 PM CDT   (Arrive by 2:45 PM)   CONSULT with Mick Young MD   [facility] Dermatologic Surgery ([facility])    [address] [place] [address]  [address]rd [facility] [address] [phone]            Oct 08, 2018  1:00 PM CDT   (Arrive by 12:45 PM)   Return Visit with Rekha Membreno MD   [facility] ([facility])    [address] [place] [address]  4th Floor  [facility] [address] [phone]              Who to contact     If you have questions or need follow up information about today's clinic visit or your schedule please contact [facility] AND INFECTIOUS DISEASES [facility] directly at [phone].  Normal or non-critical lab and imaging results will be communicated to you by MyChart, letter or phone within 4 business days after the clinic has received the  results. If you do not hear from us within 7 days, please contact the clinic through [employer] or phone. If you have a critical or abnormal lab result, we will notify you by phone as soon as possible.  Submit refill requests through [employer] or call your pharmacy and they will forward the refill request to us. Please allow 3 business days for your refill to be completed.          Additional Information About Your Visit        [employer] Information     [employer] gives you secure access to your electronic health record. If you see a primary care provider, you can also send messages to your care team and make appointments. If you have questions, please call your primary care clinic.  If you do not have a primary care provider, please call [phone] and they will assist you.        Care EveryWhere ID     This is your Care EveryWhere ID. This could be used by other organizations to access your [place] medical records  [id number]        Your Vitals Were     Pulse Temperature                93 98  F (36.7  C)           Blood Pressure from Last 3 Encounters:   08/02/18 131/86   07/27/18 120/85   07/19/18 133/83    Weight from Last 3 Encounters:   07/19/18 175 lb (79.4 kg)   07/06/18 174 lb 1.6 oz (79 kg)   07/04/18 176 lb (79.8 kg)              Today, you had the following     No orders found for display         Today's Medication Changes          These changes are accurate as of 8/2/18 11:59 PM.  If you have any questions, ask your nurse or doctor.               These medicines have changed or have updated prescriptions.        Dose/Directions    empagliflozin 10 MG Tabs tablet   Commonly known as:  JARDIANCE   This may have changed:    - how much to take  - how to take this  - when to take this  - additional instructions   Used for:  Diabetes mellitus, type 2 (H)   Changed by:  Lenora Haley PA-C        Take 1 tablet by mouth daily   Quantity:  90 tablet   Refills:  3            Where to get your medicines      These  medications were sent to [place], [place] - [address]  [address], [facility] [address]    Hours:  TRANSPLANT PHONE NUMBER [phone] Phone:  [phone]     empagliflozin 10 MG Tabs tablet                Primary Care Provider Office Phone # Fax #    Rubio MD Mehrdad [phone]       [facility] [address]   [facility] [address]        Equal Access to Services     RA JORDAN : Hadii aad ku hadasho Soomaali, waaxda luqadaha, qaybta kaalmada adeegyada, waxay idiin hayaan adeeg kharash laveronica salazar. So [facility] [phone].    ATENCIÓN: Si habla español, tiene a kohli disposición servicios gratuitos de asistencia lingüística. Llame al [phone].    We comply with applicable federal civil rights laws and [place] laws. We do not discriminate on the basis of race, color, national origin, age, disability, sex, sexual orientation, or gender identity.            Thank you!     Thank you for choosing [facility] AND INFECTIOUS DISEASES [facility]  for your care. Our goal is always to provide you with excellent care. Hearing back from our patients is one way we can continue to improve our services. Please take a few minutes to complete the written survey that you may receive in the mail after your visit with us. Thank you!             Your Updated Medication List - Protect others around you: Learn how to safely use, store and throw away your medicines at www.disposemymeds.org.          This list is accurate as of 8/2/18 11:59 PM.  Always use your most recent med list.                   Brand Name Dispense Instructions for use Diagnosis    Bictegravir-Emtricitab-Tenofov -25 MG Tabs     30 tablet    Take 1 tablet by mouth daily    HIV (human immunodeficiency virus infection) (H)       blood glucose lancets standard    no brand specified    100 Box    Use to test blood sugar four times daily or as directed.    Type 2  diabetes mellitus without complication, without long-term current use of insulin (H)       blood glucose monitoring lancets     100 each    Use to test blood sugar 2 times daily or as directed.  Ok to substitute alternative if insurance prefers.    Type 2 diabetes mellitus with hyperglycemia, without long-term current use of insulin (H)       * blood glucose monitoring meter device kit    no brand specified    1 kit    Use to test blood sugar four times daily or as directed.    Type 2 diabetes mellitus without complication, without long-term current use of insulin (H)       * blood glucose monitoring meter device kit    no brand specified    1 kit    Use to test blood sugar 4 times daily or as directed.    Type 2 diabetes mellitus with complication, without long-term current use of insulin (H)       * blood glucose monitoring test strip    no brand specified    100 each    Use to test blood sugars four times daily or as directed    Type 2 diabetes mellitus without complication, without long-term current use of insulin (H)       * blood glucose monitoring test strip    no brand specified    100 strip    1 strip by In Vitro route 4 times daily (before meals and nightly) Use to test blood sugars 4 times daily or as directed    Type 2 diabetes mellitus with complication, without long-term current use of insulin (H)       * blood glucose monitoring test strip    GELACIO CONTOUR NEXT    100 strip    Use to test blood sugar 3 times daily or as directed.  Ok to substitute alternative if insurance prefers.    Type 2 diabetes mellitus with hyperglycemia, without long-term current use of insulin (H)       empagliflozin 10 MG Tabs tablet    JARDIANCE    90 tablet    Take 1 tablet by mouth daily    Diabetes mellitus, type 2 (H)       * insulin pen needle 31G X 8 MM    B-D U/F    100 each    Use 1 pen needles daily or as directed.    Type 2 diabetes mellitus without complication, without long-term current use of insulin (H)       *  insulin pen needle 32G X 4 MM    BD SCOTT U/F    100 each    Use 1 daily as directed.    Type 2 diabetes mellitus with hyperglycemia, without long-term current use of insulin (H)       LANTUS SOLOSTAR 100 UNIT/ML injection   Generic drug:  insulin glargine     15 mL    Take 14 units each morning    Type 2 diabetes mellitus with hyperglycemia, without long-term current use of insulin (H)       omeprazole 20 MG CR capsule    priLOSEC    30 capsule    Take 1 capsule (20 mg) by mouth daily        * Notice:  This list has 7 medication(s) that are the same as other medications prescribed for you. Read the directions carefully, and ask your doctor or other care provider to review them with you.       history of htn, ckd, here with worsening renal function. Reports seeing pmd and having blood work done 2 weeks ago. Called today that it was abnormal and she should come to ER. Notes feeling weak/tired/nausea recently. Denies pain, sob, fever, chills. Taking meds as prescribed. Last saw her renal doctor about a year ago

## 2023-06-09 DIAGNOSIS — J45.909 UNSPECIFIED ASTHMA, UNCOMPLICATED: ICD-10-CM

## 2023-06-09 DIAGNOSIS — D64.9 ANEMIA, UNSPECIFIED: ICD-10-CM

## 2023-06-09 DIAGNOSIS — I10 ESSENTIAL (PRIMARY) HYPERTENSION: ICD-10-CM

## 2023-06-09 DIAGNOSIS — R19.7 DIARRHEA, UNSPECIFIED: ICD-10-CM

## 2023-06-09 DIAGNOSIS — R79.89 OTHER SPECIFIED ABNORMAL FINDINGS OF BLOOD CHEMISTRY: ICD-10-CM

## 2023-06-09 DIAGNOSIS — N18.6 END STAGE RENAL DISEASE: ICD-10-CM

## 2023-06-09 DIAGNOSIS — Z29.9 ENCOUNTER FOR PROPHYLACTIC MEASURES, UNSPECIFIED: ICD-10-CM

## 2023-06-09 DIAGNOSIS — E83.51 HYPOCALCEMIA: ICD-10-CM

## 2023-06-09 LAB
24R-OH-CALCIDIOL SERPL-MCNC: 28.8 NG/ML — LOW (ref 30–80)
ALBUMIN SERPL ELPH-MCNC: 3.1 G/DL — LOW (ref 3.3–5)
ALBUMIN SERPL ELPH-MCNC: 3.2 G/DL — LOW (ref 3.3–5)
ALBUMIN SERPL ELPH-MCNC: 3.6 G/DL — SIGNIFICANT CHANGE UP (ref 3.3–5)
ALP SERPL-CCNC: 50 U/L — SIGNIFICANT CHANGE UP (ref 40–120)
ALP SERPL-CCNC: 53 U/L — SIGNIFICANT CHANGE UP (ref 40–120)
ALP SERPL-CCNC: 57 U/L — SIGNIFICANT CHANGE UP (ref 40–120)
ALT FLD-CCNC: 10 U/L — SIGNIFICANT CHANGE UP (ref 10–45)
ANION GAP SERPL CALC-SCNC: 17 MMOL/L — SIGNIFICANT CHANGE UP (ref 5–17)
APTT BLD: 23.7 SEC — LOW (ref 27.5–35.5)
AST SERPL-CCNC: 10 U/L — SIGNIFICANT CHANGE UP (ref 10–40)
AST SERPL-CCNC: 11 U/L — SIGNIFICANT CHANGE UP (ref 10–40)
AST SERPL-CCNC: 11 U/L — SIGNIFICANT CHANGE UP (ref 10–40)
BASOPHILS # BLD AUTO: 0.01 K/UL — SIGNIFICANT CHANGE UP (ref 0–0.2)
BASOPHILS NFR BLD AUTO: 0.2 % — SIGNIFICANT CHANGE UP (ref 0–2)
BILIRUB DIRECT SERPL-MCNC: <0.2 MG/DL — SIGNIFICANT CHANGE UP (ref 0–0.3)
BILIRUB INDIRECT FLD-MCNC: SIGNIFICANT CHANGE UP MG/DL (ref 0.2–1)
BILIRUB SERPL-MCNC: 0.3 MG/DL — SIGNIFICANT CHANGE UP (ref 0.2–1.2)
BILIRUB SERPL-MCNC: 0.4 MG/DL — SIGNIFICANT CHANGE UP (ref 0.2–1.2)
BILIRUB SERPL-MCNC: 0.5 MG/DL — SIGNIFICANT CHANGE UP (ref 0.2–1.2)
BLD GP AB SCN SERPL QL: NEGATIVE — SIGNIFICANT CHANGE UP
BLD GP AB SCN SERPL QL: NEGATIVE — SIGNIFICANT CHANGE UP
BUN SERPL-MCNC: 87 MG/DL — HIGH (ref 7–23)
BUN SERPL-MCNC: 92 MG/DL — HIGH (ref 7–23)
BUN SERPL-MCNC: 96 MG/DL — HIGH (ref 7–23)
CALCIUM SERPL-MCNC: 5.6 MG/DL — CRITICAL LOW (ref 8.4–10.5)
CALCIUM SERPL-MCNC: 5.9 MG/DL — CRITICAL LOW (ref 8.4–10.5)
CALCIUM SERPL-MCNC: 6.6 MG/DL — LOW (ref 8.4–10.5)
CALCIUM SERPL-MCNC: 6.8 MG/DL — LOW (ref 8.4–10.5)
CHLORIDE SERPL-SCNC: 107 MMOL/L — SIGNIFICANT CHANGE UP (ref 96–108)
CHLORIDE SERPL-SCNC: 108 MMOL/L — SIGNIFICANT CHANGE UP (ref 96–108)
CHLORIDE SERPL-SCNC: 109 MMOL/L — HIGH (ref 96–108)
CO2 SERPL-SCNC: 17 MMOL/L — LOW (ref 22–31)
CO2 SERPL-SCNC: 17 MMOL/L — LOW (ref 22–31)
CO2 SERPL-SCNC: 18 MMOL/L — LOW (ref 22–31)
CREAT SERPL-MCNC: 16.97 MG/DL — HIGH (ref 0.5–1.3)
CREAT SERPL-MCNC: 16.99 MG/DL — HIGH (ref 0.5–1.3)
CREAT SERPL-MCNC: 17.23 MG/DL — HIGH (ref 0.5–1.3)
EGFR: 2 ML/MIN/1.73M2 — LOW
EOSINOPHIL # BLD AUTO: 0.13 K/UL — SIGNIFICANT CHANGE UP (ref 0–0.5)
EOSINOPHIL NFR BLD AUTO: 2.3 % — SIGNIFICANT CHANGE UP (ref 0–6)
GLUCOSE SERPL-MCNC: 102 MG/DL — HIGH (ref 70–99)
GLUCOSE SERPL-MCNC: 81 MG/DL — SIGNIFICANT CHANGE UP (ref 70–99)
GLUCOSE SERPL-MCNC: 82 MG/DL — SIGNIFICANT CHANGE UP (ref 70–99)
HBV CORE AB SER-ACNC: SIGNIFICANT CHANGE UP
HBV SURFACE AB SER-ACNC: SIGNIFICANT CHANGE UP
HCT VFR BLD CALC: 26.1 % — LOW (ref 34.5–45)
HGB BLD-MCNC: 8.6 G/DL — LOW (ref 11.5–15.5)
IMM GRANULOCYTES NFR BLD AUTO: 0.4 % — SIGNIFICANT CHANGE UP (ref 0–0.9)
INR BLD: 1.02 — SIGNIFICANT CHANGE UP (ref 0.88–1.16)
LYMPHOCYTES # BLD AUTO: 0.66 K/UL — LOW (ref 1–3.3)
LYMPHOCYTES # BLD AUTO: 11.7 % — LOW (ref 13–44)
MAGNESIUM SERPL-MCNC: 1.9 MG/DL — SIGNIFICANT CHANGE UP (ref 1.6–2.6)
MAGNESIUM SERPL-MCNC: 2 MG/DL — SIGNIFICANT CHANGE UP (ref 1.6–2.6)
MCHC RBC-ENTMCNC: 29.7 PG — SIGNIFICANT CHANGE UP (ref 27–34)
MCHC RBC-ENTMCNC: 33 GM/DL — SIGNIFICANT CHANGE UP (ref 32–36)
MCV RBC AUTO: 90 FL — SIGNIFICANT CHANGE UP (ref 80–100)
MONOCYTES # BLD AUTO: 0.49 K/UL — SIGNIFICANT CHANGE UP (ref 0–0.9)
MONOCYTES NFR BLD AUTO: 8.7 % — SIGNIFICANT CHANGE UP (ref 2–14)
NEUTROPHILS # BLD AUTO: 4.32 K/UL — SIGNIFICANT CHANGE UP (ref 1.8–7.4)
NEUTROPHILS NFR BLD AUTO: 76.7 % — SIGNIFICANT CHANGE UP (ref 43–77)
NRBC # BLD: 0 /100 WBCS — SIGNIFICANT CHANGE UP (ref 0–0)
NT-PROBNP SERPL-SCNC: HIGH PG/ML (ref 0–300)
PHOSPHATE SERPL-MCNC: 7.6 MG/DL — HIGH (ref 2.5–4.5)
PLATELET # BLD AUTO: 186 K/UL — SIGNIFICANT CHANGE UP (ref 150–400)
POTASSIUM SERPL-MCNC: 3.9 MMOL/L — SIGNIFICANT CHANGE UP (ref 3.5–5.3)
POTASSIUM SERPL-MCNC: 4.1 MMOL/L — SIGNIFICANT CHANGE UP (ref 3.5–5.3)
POTASSIUM SERPL-MCNC: 4.1 MMOL/L — SIGNIFICANT CHANGE UP (ref 3.5–5.3)
POTASSIUM SERPL-SCNC: 3.9 MMOL/L — SIGNIFICANT CHANGE UP (ref 3.5–5.3)
POTASSIUM SERPL-SCNC: 4.1 MMOL/L — SIGNIFICANT CHANGE UP (ref 3.5–5.3)
POTASSIUM SERPL-SCNC: 4.1 MMOL/L — SIGNIFICANT CHANGE UP (ref 3.5–5.3)
PROT SERPL-MCNC: 5.2 G/DL — LOW (ref 6–8.3)
PROT SERPL-MCNC: 5.5 G/DL — LOW (ref 6–8.3)
PROT SERPL-MCNC: 5.9 G/DL — LOW (ref 6–8.3)
PROTHROM AB SERPL-ACNC: 12.2 SEC — SIGNIFICANT CHANGE UP (ref 10.5–13.4)
PTH-INTACT FLD-MCNC: 792 PG/ML — HIGH (ref 15–65)
RBC # BLD: 2.9 M/UL — LOW (ref 3.8–5.2)
RBC # FLD: 13.2 % — SIGNIFICANT CHANGE UP (ref 10.3–14.5)
RH IG SCN BLD-IMP: POSITIVE — SIGNIFICANT CHANGE UP
RH IG SCN BLD-IMP: POSITIVE — SIGNIFICANT CHANGE UP
SODIUM SERPL-SCNC: 142 MMOL/L — SIGNIFICANT CHANGE UP (ref 135–145)
SODIUM SERPL-SCNC: 142 MMOL/L — SIGNIFICANT CHANGE UP (ref 135–145)
SODIUM SERPL-SCNC: 143 MMOL/L — SIGNIFICANT CHANGE UP (ref 135–145)
T4 FREE SERPL-MCNC: 1.34 NG/DL — SIGNIFICANT CHANGE UP (ref 0.93–1.7)
TSH SERPL-MCNC: 6.58 UIU/ML — HIGH (ref 0.27–4.2)
WBC # BLD: 5.63 K/UL — SIGNIFICANT CHANGE UP (ref 3.8–10.5)
WBC # FLD AUTO: 5.63 K/UL — SIGNIFICANT CHANGE UP (ref 3.8–10.5)

## 2023-06-09 PROCEDURE — 71045 X-RAY EXAM CHEST 1 VIEW: CPT | Mod: 26,77

## 2023-06-09 PROCEDURE — 36558 INSERT TUNNELED CV CATH: CPT

## 2023-06-09 PROCEDURE — 90935 HEMODIALYSIS ONE EVALUATION: CPT

## 2023-06-09 PROCEDURE — 77001 FLUOROGUIDE FOR VEIN DEVICE: CPT | Mod: 26

## 2023-06-09 PROCEDURE — 99232 SBSQ HOSP IP/OBS MODERATE 35: CPT | Mod: GC

## 2023-06-09 PROCEDURE — 76937 US GUIDE VASCULAR ACCESS: CPT | Mod: 26

## 2023-06-09 PROCEDURE — 71045 X-RAY EXAM CHEST 1 VIEW: CPT | Mod: 26

## 2023-06-09 RX ORDER — CALCIUM CARBONATE 500(1250)
1 TABLET ORAL
Refills: 0 | Status: DISCONTINUED | OUTPATIENT
Start: 2023-06-09 | End: 2023-06-12

## 2023-06-09 RX ORDER — CALCIUM GLUCONATE 100 MG/ML
2 VIAL (ML) INTRAVENOUS ONCE
Refills: 0 | Status: COMPLETED | OUTPATIENT
Start: 2023-06-09 | End: 2023-06-09

## 2023-06-09 RX ORDER — ADENOSINE 3 MG/ML
6 INJECTION INTRAVENOUS ONCE
Refills: 0 | Status: DISCONTINUED | OUTPATIENT
Start: 2023-06-09 | End: 2023-06-09

## 2023-06-09 RX ORDER — METOPROLOL TARTRATE 50 MG
1 TABLET ORAL
Qty: 0 | Refills: 0 | DISCHARGE

## 2023-06-09 RX ORDER — BUDESONIDE AND FORMOTEROL FUMARATE DIHYDRATE 160; 4.5 UG/1; UG/1
2 AEROSOL RESPIRATORY (INHALATION)
Qty: 0 | Refills: 0 | DISCHARGE

## 2023-06-09 RX ORDER — CALCITRIOL 0.5 UG/1
1 CAPSULE ORAL EVERY 12 HOURS
Refills: 0 | Status: DISCONTINUED | OUTPATIENT
Start: 2023-06-09 | End: 2023-06-12

## 2023-06-09 RX ORDER — CALCITRIOL 0.5 UG/1
1 CAPSULE ORAL
Refills: 0 | DISCHARGE

## 2023-06-09 RX ORDER — CALCITRIOL 0.5 UG/1
0.25 CAPSULE ORAL DAILY
Refills: 0 | Status: DISCONTINUED | OUTPATIENT
Start: 2023-06-09 | End: 2023-06-09

## 2023-06-09 RX ORDER — BUDESONIDE AND FORMOTEROL FUMARATE DIHYDRATE 160; 4.5 UG/1; UG/1
2 AEROSOL RESPIRATORY (INHALATION)
Refills: 0 | Status: DISCONTINUED | OUTPATIENT
Start: 2023-06-09 | End: 2023-06-14

## 2023-06-09 RX ORDER — LEVALBUTEROL 1.25 MG/.5ML
2 SOLUTION, CONCENTRATE RESPIRATORY (INHALATION)
Refills: 0 | DISCHARGE

## 2023-06-09 RX ORDER — SODIUM BICARBONATE 1 MEQ/ML
650 SYRINGE (ML) INTRAVENOUS THREE TIMES A DAY
Refills: 0 | Status: DISCONTINUED | OUTPATIENT
Start: 2023-06-09 | End: 2023-06-12

## 2023-06-09 RX ORDER — ALBUTEROL 90 UG/1
2 AEROSOL, METERED ORAL
Qty: 0 | Refills: 0 | DISCHARGE

## 2023-06-09 RX ORDER — BUDESONIDE AND FORMOTEROL FUMARATE DIHYDRATE 160; 4.5 UG/1; UG/1
2 AEROSOL RESPIRATORY (INHALATION)
Refills: 0 | DISCHARGE

## 2023-06-09 RX ORDER — LISINOPRIL 2.5 MG/1
1 TABLET ORAL
Refills: 0 | DISCHARGE

## 2023-06-09 RX ORDER — CARVEDILOL PHOSPHATE 80 MG/1
1 CAPSULE, EXTENDED RELEASE ORAL
Refills: 0 | DISCHARGE

## 2023-06-09 RX ORDER — METOPROLOL TARTRATE 50 MG
5 TABLET ORAL ONCE
Refills: 0 | Status: DISCONTINUED | OUTPATIENT
Start: 2023-06-09 | End: 2023-06-09

## 2023-06-09 RX ORDER — CALCITRIOL 0.5 UG/1
1 CAPSULE ORAL DAILY
Refills: 0 | Status: DISCONTINUED | OUTPATIENT
Start: 2023-06-09 | End: 2023-06-09

## 2023-06-09 RX ORDER — LEVALBUTEROL 1.25 MG/.5ML
0.31 SOLUTION, CONCENTRATE RESPIRATORY (INHALATION) EVERY 6 HOURS
Refills: 0 | Status: DISCONTINUED | OUTPATIENT
Start: 2023-06-09 | End: 2023-06-14

## 2023-06-09 RX ADMIN — LISINOPRIL 10 MILLIGRAM(S): 2.5 TABLET ORAL at 07:47

## 2023-06-09 RX ADMIN — Medication 200 GRAM(S): at 07:48

## 2023-06-09 RX ADMIN — CALCITRIOL 1 MICROGRAM(S): 0.5 CAPSULE ORAL at 16:17

## 2023-06-09 RX ADMIN — CARVEDILOL PHOSPHATE 12.5 MILLIGRAM(S): 80 CAPSULE, EXTENDED RELEASE ORAL at 10:39

## 2023-06-09 RX ADMIN — Medication 650 MILLIGRAM(S): at 20:43

## 2023-06-09 RX ADMIN — Medication 650 MILLIGRAM(S): at 21:29

## 2023-06-09 RX ADMIN — Medication 650 MILLIGRAM(S): at 05:53

## 2023-06-09 RX ADMIN — Medication 1 TABLET(S): at 16:14

## 2023-06-09 RX ADMIN — Medication 200 GRAM(S): at 20:43

## 2023-06-09 RX ADMIN — Medication 650 MILLIGRAM(S): at 16:14

## 2023-06-09 RX ADMIN — BUDESONIDE AND FORMOTEROL FUMARATE DIHYDRATE 2 PUFF(S): 160; 4.5 AEROSOL RESPIRATORY (INHALATION) at 19:44

## 2023-06-09 RX ADMIN — BUDESONIDE AND FORMOTEROL FUMARATE DIHYDRATE 2 PUFF(S): 160; 4.5 AEROSOL RESPIRATORY (INHALATION) at 05:54

## 2023-06-09 RX ADMIN — CALCITRIOL 1 MICROGRAM(S): 0.5 CAPSULE ORAL at 23:16

## 2023-06-09 RX ADMIN — AMLODIPINE BESYLATE 10 MILLIGRAM(S): 2.5 TABLET ORAL at 05:52

## 2023-06-09 RX ADMIN — Medication 200 GRAM(S): at 15:25

## 2023-06-09 RX ADMIN — Medication 200 GRAM(S): at 00:45

## 2023-06-09 RX ADMIN — Medication 1334 MILLIGRAM(S): at 16:30

## 2023-06-09 RX ADMIN — CARVEDILOL PHOSPHATE 12.5 MILLIGRAM(S): 80 CAPSULE, EXTENDED RELEASE ORAL at 21:29

## 2023-06-09 RX ADMIN — ONDANSETRON 4 MILLIGRAM(S): 8 TABLET, FILM COATED ORAL at 19:54

## 2023-06-09 NOTE — PROGRESS NOTE ADULT - PROBLEM SELECTOR PLAN 4
Elevated BNP to 42K. Patient with renal failure which may elevate BNP, however will r/o heart failure.  - f/u TTE

## 2023-06-09 NOTE — PROGRESS NOTE ADULT - PROBLEM SELECTOR PLAN 1
Patient found to have hypocalcemia to 5.3 on admission with EKG changes (prolonged QTc), admitted to telemetry. Likely 2/2 worsening renal failure.  - s/p 1g Ca x2  Plan:  - 2g calcium IV  - f/u repeat Ca in AM  - phoslo TID  - monitor EKGs  - f/u PTH, vit D studies Patient found to have hypocalcemia to 5.3 on admission with EKG changes (prolonged QTc), admitted to telemetry. Likely 2/2 worsening renal failure. S/p 1g Ca x2  - 2g calcium IV  - f/u repeat Ca in AM  - phoslo TID  - monitor EKGs  - f/u PTH, vit D studies

## 2023-06-09 NOTE — PROGRESS NOTE ADULT - SUBJECTIVE AND OBJECTIVE BOX
CC: The patient is a 37y Female admitted for acute renal failure requiring HD and hypocalcemia.     INTERVAL EVENTS: KRYSTA    SUBJECTIVE / INTERVAL HPI: Patient seen and examined at bedside.     ROS: negative unless otherwise stated above.    VITAL SIGNS:  Vital Signs Last 24 Hrs  T(C): 36.6 (2023 05:25), Max: 37.1 (2023 17:05)  T(F): 97.9 (2023 05:25), Max: 98.7 (2023 17:05)  HR: 78 (2023 04:00) (78 - 96)  BP: 129/76 (2023 04:00) (129/76 - 147/78)  BP(mean): 97 (2023 04:00) (97 - 107)  RR: 20 (2023 04:00) (18 - 20)  SpO2: 99% (2023 04:00) (98% - 100%)    Parameters below as of 2023 04:00  Patient On (Oxygen Delivery Method): room air      PHYSICAL EXAM:  Constitutional: resting comfortably; NAD  HEENT: NC/AT, PERRL, EOMI, anicteric sclera, MMM  Neck: supple; no JVD or thyromegaly  Respiratory: CTA B/L; no W/R/R, no retractions  Cardiac: +S1/S2; RRR; no M/R/G  Gastrointestinal: soft, NT/ND; no rebound or guarding; +BSx4  Extremities: WWP, no clubbing or cyanosis; no peripheral edema  Musculoskeletal: NROM x4; no joint swelling, tenderness or erythema  Vascular: 2+ radial, DP/PT pulses B/L  Dermatologic: skin warm, dry and intact; no rashes, wounds, or scars  Neurologic: AAOx3, no focal deficits  Psychiatric: calm, cooperative, behaviors are appropriate, denies SI/HI    MEDICATIONS:  MEDICATIONS  (STANDING):  amLODIPine   Tablet 10 milliGRAM(s) Oral daily  budesonide 160 MICROgram(s)/formoterol 4.5 MICROgram(s) Inhaler 2 Puff(s) Inhalation two times a day  calcitriol   Capsule 0.25 MICROGram(s) Oral daily  calcium acetate 1334 milliGRAM(s) Oral three times a day with meals  carvedilol 12.5 milliGRAM(s) Oral every 12 hours  lisinopril 10 milliGRAM(s) Oral daily  sodium bicarbonate 650 milliGRAM(s) Oral three times a day    MEDICATIONS  (PRN):  acetaminophen     Tablet .. 650 milliGRAM(s) Oral every 6 hours PRN Temp greater or equal to 38C (100.4F), Mild Pain (1 - 3)  levalbuterol Inhalation 0.31 milliGRAM(s) Inhalation every 6 hours PRN shortness of breath  melatonin 3 milliGRAM(s) Oral at bedtime PRN Insomnia  ondansetron Injectable 4 milliGRAM(s) IV Push every 8 hours PRN Nausea and/or Vomiting      ALLERGIES:  Allergies    No Known Allergies    Intolerances        LABS:                        7.0    4.77  )-----------( 195      ( 2023 20:55 )             21.2     06-09    142  |  108  |  x   ----------------------------<  81  4.1   |  x   |  x     Ca    5.5<LL>      2023 20:52  Phos  6.7     06-08  Mg     2.0     06-09    TPro  5.1<L>  /  Alb  3.4  /  TBili  0.2  /  DBili  0.2  /  AST  14  /  ALT  11  /  AlkPhos  51  06-08    PT/INR - ( 2023 05:30 )   PT: 12.2 sec;   INR: 1.02          PTT - ( 2023 05:30 )  PTT:23.7 sec  Urinalysis Basic - ( 2023 15:23 )    Color: Yellow / Appearance: Clear / S.020 / pH: x  Gluc: x / Ketone: NEGATIVE  / Bili: Negative / Urobili: 0.2 E.U./dL   Blood: x / Protein: >=300 mg/dL / Nitrite: NEGATIVE   Leuk Esterase: Trace / RBC: 5-10 /HPF / WBC > 10 /HPF   Sq Epi: x / Non Sq Epi: x / Bacteria: Present /HPF        RADIOLOGY & ADDITIONAL TESTS: Reviewed.   CC: The patient is a 37y Female admitted for acute renal failure requiring HD and hypocalcemia.     INTERVAL EVENTS: 1UpRBc completed for Hgb 7.0. QTc 12 am 551, improved to QTC at 6 am 504, Ca 5.9 additional 2gram Calcium gluconate given.    SUBJECTIVE / INTERVAL HPI: Patient seen and examined at bedside. Feeling fine this AM with no acute complaints. Denies headaches, tingling, twitching, N/V/D/C.     ROS: negative unless otherwise stated above.    VITAL SIGNS:  Vital Signs Last 24 Hrs  T(C): 36.6 (2023 05:25), Max: 37.1 (2023 17:05)  T(F): 97.9 (2023 05:25), Max: 98.7 (2023 17:05)  HR: 78 (2023 04:00) (78 - 96)  BP: 129/76 (2023 04:00) (129/76 - 147/78)  BP(mean): 97 (2023 04:00) (97 - 107)  RR: 20 (2023 04:00) (18 - 20)  SpO2: 99% (2023 04:00) (98% - 100%)    Parameters below as of 2023 04:00  Patient On (Oxygen Delivery Method): room air      PHYSICAL EXAM:  Constitutional: young female resting comfortably; NAD  HEENT: NC/AT, anicteric sclera, MMM  Neck: supple  Respiratory: CTA B/L  Cardiac: +S1/S2; RRR  Gastrointestinal: soft, NT/ND  Extremities: WWP, no clubbing or cyanosis; 1+ b/l LE edema  Vascular: 2+ radial, DP/PT pulses B/L  Dermatologic: skin warm, dry and intact  Neurologic: AAOx3, no focal deficits  Psychiatric: calm, cooperative, behaviors are appropriate    MEDICATIONS:  MEDICATIONS  (STANDING):  amLODIPine   Tablet 10 milliGRAM(s) Oral daily  budesonide 160 MICROgram(s)/formoterol 4.5 MICROgram(s) Inhaler 2 Puff(s) Inhalation two times a day  calcitriol   Capsule 0.25 MICROGram(s) Oral daily  calcium acetate 1334 milliGRAM(s) Oral three times a day with meals  carvedilol 12.5 milliGRAM(s) Oral every 12 hours  lisinopril 10 milliGRAM(s) Oral daily  sodium bicarbonate 650 milliGRAM(s) Oral three times a day    MEDICATIONS  (PRN):  acetaminophen     Tablet .. 650 milliGRAM(s) Oral every 6 hours PRN Temp greater or equal to 38C (100.4F), Mild Pain (1 - 3)  levalbuterol Inhalation 0.31 milliGRAM(s) Inhalation every 6 hours PRN shortness of breath  melatonin 3 milliGRAM(s) Oral at bedtime PRN Insomnia  ondansetron Injectable 4 milliGRAM(s) IV Push every 8 hours PRN Nausea and/or Vomiting      ALLERGIES:  Allergies    No Known Allergies    Intolerances        LABS:                        7.0    4.77  )-----------( 195      ( 2023 20:55 )             21.2     06-09    142  |  108  |  x   ----------------------------<  81  4.1   |  x   |  x     Ca    5.5<LL>      2023 20:52  Phos  6.7     06-08  Mg     2.0     06-    TPro  5.1<L>  /  Alb  3.4  /  TBili  0.2  /  DBili  0.2  /  AST  14  /  ALT  11  /  AlkPhos  51  06-08    PT/INR - ( 2023 05:30 )   PT: 12.2 sec;   INR: 1.02          PTT - ( 2023 05:30 )  PTT:23.7 sec  Urinalysis Basic - ( 2023 15:23 )    Color: Yellow / Appearance: Clear / S.020 / pH: x  Gluc: x / Ketone: NEGATIVE  / Bili: Negative / Urobili: 0.2 E.U./dL   Blood: x / Protein: >=300 mg/dL / Nitrite: NEGATIVE   Leuk Esterase: Trace / RBC: 5-10 /HPF / WBC > 10 /HPF   Sq Epi: x / Non Sq Epi: x / Bacteria: Present /HPF    RADIOLOGY & ADDITIONAL TESTS: Reviewed.

## 2023-06-09 NOTE — PROGRESS NOTE ADULT - ATTENDING COMMENTS
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 Ca improving, increase calcitriol and start cacarbonate, on Ph binder. s/p PRBC for anemia  Appreciate IR placement of tunneled IJ dialysis catheter this afternoon. Sinus tachy and nausea post procedure, d/w IR, verified line placement, sx likely due to sedation. Recheck EKG and serum Ca levels.   d/w pt option of HD vs PD, and need to asap transplant eval, mother with HTN would like to be considered a donor.   Hold off on AVF vein mapping, may not need.   Plan for first HD session tmrw.  TB quant pending

## 2023-06-09 NOTE — PROGRESS NOTE ADULT - PROBLEM SELECTOR PLAN 8
F: tolerating PO, no IVF  E: replete K<4, Mg<1.8, Phos<3, Ca<8.5   N: renal diet, NPO after midnight    VTE Prophylaxis: hold for HD cath placement  GI: not needed  C: Full Code  D: 7Lach F: tolerating PO, no IVF  E: replete K<4, Mg<1.8, Phos<3, Ca<8.5   N: renal diet    VTE Prophylaxis: SCDs  GI: not needed  C: Full Code  D: 7Lach

## 2023-06-09 NOTE — PHYSICAL THERAPY INITIAL EVALUATION ADULT - ADDITIONAL COMMENTS
Pt live with her  and son in an elevator access apt. At baseline ambulates independently with no DME. Reports she gets SOB and fatigued requiring standing rest every 2 blocks at baseline.

## 2023-06-09 NOTE — PROGRESS NOTE ADULT - PROBLEM SELECTOR PLAN 2
#New HD initiation  Patient presenting from outpatient PCP provider with elevated Cr and BUN for dialysis initiation. Nephrology consulted. On admission Cr elevated to 16.98 w/ BUN 94. Sxs of fatigue. 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 Neves. Home mediations: bicarb 650 TID, calcitriol 0.25 daily, vit D2 50K units weekly  - f/u nephro recs  - IR for permacath  - vascular consult in AM for vein mapping  - f/u quant   - c/w home bicarb and calcitriol  - hold vit D2 50K inpatient, restart as outpatient.  - renal diet  - social work consult for HD unit #New HD initiation  Patient presenting from outpatient PCP provider with elevated Cr and BUN for dialysis initiation. Nephrology consulted. On admission Cr elevated to 16.98 w/ BUN 94. Sxs of fatigue. 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 Neves. Home mediations: bicarb 650 TID, calcitriol 0.25 daily, vit D2 50K units weekly  - f/u nephro recs  - IR for permacath  - vascular consult for vein mapping  - f/u quant   - c/w home bicarb and calcitriol  - hold vit D2 50K inpatient, restart as outpatient  - renal diet  - social work consult for HD unit

## 2023-06-09 NOTE — CONSULT NOTE ADULT - SUBJECTIVE AND OBJECTIVE BOX
HPI:  37F PMH of HTN, asthma, CKD Stage 4 not on HD (baseline Cr 2.92), PSH lap paloma, , bariatric sleeve gastrectomy (2019) presents from outpatient PCP for abnormal labs. Patient states that she went to her PCP (Dr. Garcia) who stated she had abnormal labs including uremia, and she was told to come to the ED for dialysis initiation. Patient had not seen her PCP for over 2 years prior to this visit due to issues with insurance. Pt also states that for the last four months she has been experiencing worsening fatigue as well as intermittent nausea/vomiting. Her fatigue is worse when walking up/down stairs and also endorses dyspnea on exertion. Nausea vomiting occurs about once a week, mostly dry heaving and states food tastes bad in her mouth. No hematemesis. Has not experienced these symptoms before. No lightheadedness, dizziness, syncope, falls. No known history of anemia, unknown baseline Hb. No hematuria/melena/hematochezia. Never had a colonoscopy. Pt currently on her period, has had new history irregular, more frequent periods starting this past September. Periods last 4-5 days, uses about 4 pads a day, regular flow. No family history of polyps or fibroids.     VASCULAR ADDENDUM: 37F admitted with uremic renal failure 2/2 FSGS and hypocalcemia seen for presaturation for AVF. She denies a history of prior central line placement. She is left handed and is presently unemployed. Previously she worked as an aide.     PAST MEDICAL & SURGICAL HISTORY:  Asthma  HTN (hypertension)  Morbid obesity  Nephropathy  History o cholecystectomy  H/O:  section    MEDICATIONS  (STANDING):  amLODIPine   Tablet 10 milliGRAM(s) Oral daily  budesonide 160 MICROgram(s)/formoterol 4.5 MICROgram(s) Inhaler 2 Puff(s) Inhalation two times a day  calcitriol   Capsule 1 MICROGram(s) Oral daily  calcium acetate 1334 milliGRAM(s) Oral three times a day with meals  calcium carbonate   1250 mG (OsCal) 1 Tablet(s) Oral two times a day  carvedilol 12.5 milliGRAM(s) Oral every 12 hours  lisinopril 10 milliGRAM(s) Oral daily  sodium bicarbonate 650 milliGRAM(s) Oral three times a day  trimethobenzamide 300 milliGRAM(s) Oral every 8 hours    MEDICATIONS  (PRN):  acetaminophen     Tablet .. 650 milliGRAM(s) Oral every 6 hours PRN Temp greater or equal to 38C (100.4F), Mild Pain (1 - 3)  levalbuterol Inhalation 0.31 milliGRAM(s) Inhalation every 6 hours PRN shortness of breath  melatonin 3 milliGRAM(s) Oral at bedtime PRN Insomnia  ondansetron Injectable 4 milliGRAM(s) IV Push every 8 hours PRN Nausea and/or Vomiting      Allergies    No Known Allergies    Intolerances        SOCIAL HISTORY:    FAMILY HISTORY:      Vital Signs Last 24 Hrs  T(C): 36.9 (2023 17:14), Max: 36.9 (2023 17:14)  T(F): 98.4 (2023 17:14), Max: 98.4 (2023 17:14)  HR: 149 (2023 16:41) (78 - 149)  BP: 140/79 (2023 16:41) (118/62 - 147/78)  BP(mean): 100 (2023 16:41) (84 - 107)  RR: 14 (2023 16:41) (14 - 20)  SpO2: 100% (2023 16:41) (98% - 100%)    Parameters below as of 2023 16:41  Patient On (Oxygen Delivery Method): room air        PHYSICAL EXAM:      General: Awake, alert. Cooperative  CV: HDS, WWP  Pulm: On room air  Abd: S/nt/nd  Ext: B/L arms w/ 2+ radial pulses. No bulging veins.       LABS:                        8.6    5.63  )-----------( 186      ( 2023 11:02 )             26.1     06-09    142  |  107  |  87<H>  ----------------------------<  82  4.1   |  18<L>  |  16.97<H>    Ca    6.6<L>      2023 11:02  Phos  7.6     06-09  Mg     2.0     06-09    TPro  5.9<L>  /  Alb  3.6  /  TBili  0.5  /  DBili  <0.2  /  AST  10  /  ALT  10  /  AlkPhos  57  06-09    PT/INR - ( 2023 05:30 )   PT: 12.2 sec;   INR: 1.02          PTT - ( 2023 05:30 )  PTT:23.7 sec  Urinalysis Basic - ( 2023 15:23 )    Color: Yellow / Appearance: Clear / S.020 / pH: x  Gluc: x / Ketone: NEGATIVE  / Bili: Negative / Urobili: 0.2 E.U./dL   Blood: x / Protein: >=300 mg/dL / Nitrite: NEGATIVE   Leuk Esterase: Trace / RBC: 5-10 /HPF / WBC > 10 /HPF   Sq Epi: x / Non Sq Epi: x / Bacteria: Present /HPF    RADIOLOGY & ADDITIONAL STUDIES:    A/P: 37F w/ PMHx ESRD newly on dialysis seen for AVF creation. Patient is right handed. Vein mapping has been ordered.     - WIll follow up vein mapping.   - Patient seems to express a preference for discharge followed by outpatient follow up with Dr. Franklin for elective fistula creation. If she remains admitted, can consider inpatient fistula surgery.   - Vascular to follow.     Discussed with chief on call.  HPI:  37F PMH of HTN, asthma, CKD Stage 4 not on HD (baseline Cr 2.92), PSH lap paloma, , bariatric sleeve gastrectomy (2019) presents from outpatient PCP for abnormal labs. Patient states that she went to her PCP (Dr. Garcia) who stated she had abnormal labs including uremia, and she was told to come to the ED for dialysis initiation. Patient had not seen her PCP for over 2 years prior to this visit due to issues with insurance. Pt also states that for the last four months she has been experiencing worsening fatigue as well as intermittent nausea/vomiting. Her fatigue is worse when walking up/down stairs and also endorses dyspnea on exertion. Nausea vomiting occurs about once a week, mostly dry heaving and states food tastes bad in her mouth. No hematemesis. Has not experienced these symptoms before. No lightheadedness, dizziness, syncope, falls. No known history of anemia, unknown baseline Hb. No hematuria/melena/hematochezia. Never had a colonoscopy. Pt currently on her period, has had new history irregular, more frequent periods starting this past September. Periods last 4-5 days, uses about 4 pads a day, regular flow. No family history of polyps or fibroids.     VASCULAR ADDENDUM: 37F admitted with uremic renal failure 2/2 FSGS and hypocalcemia seen for presaturation for AVF. She denies a history of prior central line placement. She is left handed and is presently unemployed. Previously she worked as an aide.     PAST MEDICAL & SURGICAL HISTORY:  Asthma  HTN (hypertension)  Morbid obesity  Nephropathy  History o cholecystectomy  H/O:  section    MEDICATIONS  (STANDING):  amLODIPine   Tablet 10 milliGRAM(s) Oral daily  budesonide 160 MICROgram(s)/formoterol 4.5 MICROgram(s) Inhaler 2 Puff(s) Inhalation two times a day  calcitriol   Capsule 1 MICROGram(s) Oral daily  calcium acetate 1334 milliGRAM(s) Oral three times a day with meals  calcium carbonate   1250 mG (OsCal) 1 Tablet(s) Oral two times a day  carvedilol 12.5 milliGRAM(s) Oral every 12 hours  lisinopril 10 milliGRAM(s) Oral daily  sodium bicarbonate 650 milliGRAM(s) Oral three times a day  trimethobenzamide 300 milliGRAM(s) Oral every 8 hours    MEDICATIONS  (PRN):  acetaminophen     Tablet .. 650 milliGRAM(s) Oral every 6 hours PRN Temp greater or equal to 38C (100.4F), Mild Pain (1 - 3)  levalbuterol Inhalation 0.31 milliGRAM(s) Inhalation every 6 hours PRN shortness of breath  melatonin 3 milliGRAM(s) Oral at bedtime PRN Insomnia  ondansetron Injectable 4 milliGRAM(s) IV Push every 8 hours PRN Nausea and/or Vomiting      Allergies    No Known Allergies    Intolerances        SOCIAL HISTORY:    FAMILY HISTORY:      Vital Signs Last 24 Hrs  T(C): 36.9 (2023 17:14), Max: 36.9 (2023 17:14)  T(F): 98.4 (2023 17:14), Max: 98.4 (2023 17:14)  HR: 149 (2023 16:41) (78 - 149)  BP: 140/79 (2023 16:41) (118/62 - 147/78)  BP(mean): 100 (2023 16:41) (84 - 107)  RR: 14 (2023 16:41) (14 - 20)  SpO2: 100% (2023 16:41) (98% - 100%)    Parameters below as of 2023 16:41  Patient On (Oxygen Delivery Method): room air        PHYSICAL EXAM:      General: Awake, alert. Cooperative  CV: HDS, WWP  Pulm: On room air  Abd: S/nt/nd  Ext: B/L arms w/ 2+ radial pulses. No bulging veins.       LABS:                        8.6    5.63  )-----------( 186      ( 2023 11:02 )             26.1     06-09    142  |  107  |  87<H>  ----------------------------<  82  4.1   |  18<L>  |  16.97<H>    Ca    6.6<L>      2023 11:02  Phos  7.6     06-09  Mg     2.0     06-09    TPro  5.9<L>  /  Alb  3.6  /  TBili  0.5  /  DBili  <0.2  /  AST  10  /  ALT  10  /  AlkPhos  57  06-09    PT/INR - ( 2023 05:30 )   PT: 12.2 sec;   INR: 1.02          PTT - ( 2023 05:30 )  PTT:23.7 sec  Urinalysis Basic - ( 2023 15:23 )    Color: Yellow / Appearance: Clear / S.020 / pH: x  Gluc: x / Ketone: NEGATIVE  / Bili: Negative / Urobili: 0.2 E.U./dL   Blood: x / Protein: >=300 mg/dL / Nitrite: NEGATIVE   Leuk Esterase: Trace / RBC: 5-10 /HPF / WBC > 10 /HPF   Sq Epi: x / Non Sq Epi: x / Bacteria: Present /HPF    RADIOLOGY & ADDITIONAL STUDIES:    A/P: 37F w/ PMHx ESRD newly on dialysis seen for AVF creation. Patient is right handed. Vein mapping has been ordered.     - WIll follow up vein mapping.   - Patient seems to express a preference for discharge followed by outpatient follow up with Dr. Franklin for elective fistula creation. If she remains admitted, can consider inpatient fistula surgery.   -Please have patient call [phone] to schedule an appointment to see Dr. Franklin in office  - Vascular will sign off for now     Discussed with chief on call.

## 2023-06-09 NOTE — CONSULT NOTE ADULT - SUBJECTIVE AND OBJECTIVE BOX
37F PMH of HTN, asthma, CKD Stage 4 not on HD (baseline Cr 2.92, Advanced CKD likely 2/2 secondary FSGS), PSH lap paloma, , bariatric sleeve gastrectomy (2019) presents from outpatient PCP for worsening renal function, admitted for HD initiation, on telemetry for hypocalcemia w/ EKG changes. IR consulted for HD catheter placement.     Clinical History: RENAL FAILURE, ACUTE ONCHRONIC; HYPOCALCEMIA    Handoff    MEWS Score    Asthma    HTN (hypertension)    Thyroid disease    Morbid obesity    Nephropathy    No pertinent past medical history    Renal failure, acute on chronic    Hypocalcemia    ESRD needing dialysis    Anemia    Prophylactic measure    Symptomatic anemia    Elevated brain natriuretic peptide (BNP) level    Hypertension    Asthma    Diarrhea    History of cholecystectomy    Induced termination of pregnancy    H/O:  section    WEAKNESS    90+    Hypocalcemia    Prolonged QT interval    [email or web address]        Meds:acetaminophen     Tablet .. 650 milliGRAM(s) Oral every 6 hours PRN  amLODIPine   Tablet 10 milliGRAM(s) Oral daily  budesonide 160 MICROgram(s)/formoterol 4.5 MICROgram(s) Inhaler 2 Puff(s) Inhalation two times a day  calcitriol   Capsule 0.25 MICROGram(s) Oral daily  calcium acetate 1334 milliGRAM(s) Oral three times a day with meals  carvedilol 12.5 milliGRAM(s) Oral every 12 hours  levalbuterol Inhalation 0.31 milliGRAM(s) Inhalation every 6 hours PRN  lisinopril 10 milliGRAM(s) Oral daily  melatonin 3 milliGRAM(s) Oral at bedtime PRN  ondansetron Injectable 4 milliGRAM(s) IV Push every 8 hours PRN  sodium bicarbonate 650 milliGRAM(s) Oral three times a day      Allergies:No Known Allergies        Labs:                           8.6    5.63  )-----------( 186      ( 2023 11:02 )             26.1     PT/INR - ( 2023 05:30 )   PT: 12.2 sec;   INR: 1.02          PTT - ( 2023 05:30 )  PTT:23.7 sec      142  |  107  |  87<H>  ----------------------------<  82  4.1   |  18<L>  |  16.97<H>    Ca    6.6<L>      2023 11:02  Phos  7.6       Mg     2.0         TPro  5.2<L>  /  Alb  3.2<L>  /  TBili  0.4  /  DBili  x   /  AST  11  /  ALT  10  /  AlkPhos  50

## 2023-06-09 NOTE — PROGRESS NOTE ADULT - PROBLEM SELECTOR PLAN 3
Patient with Hb of 7.9 on admission. Symptomatic with worsening fatigue and PIMENTEL for 4 months. No known history of anemia. Patient currently menstruating and endorses abnormal/increased bleeding since Sept 2022.  Otherwise no other active sources of bleeding. Never had colonoscopy.  - transfuse 1 unit pRBCs  - f/u post transfusion CBC  - f/u collateral for baseline Hb  - Fe studies  - monitor H+H  - maintain active T+S  - PT consulted

## 2023-06-09 NOTE — CONSULT NOTE ADULT - ASSESSMENT
Assessment: 37F PMH of HTN, asthma, CKD Stage 4 not on HD (baseline Cr 2.92, Advanced CKD likely 2/2 secondary FSGS), PSH lap paloma, , bariatric sleeve gastrectomy (2019) presents from outpatient PCP for worsening renal function, admitted for HD initiation, on telemetry for hypocalcemia w/ EKG changes. IR consulted for HD catheter placement. Case reviewed with Dr. Guido, plan for procedure with sedation after repeat CBC.      Recommendations: Maintain NPO x 8 hours prior to procedure.     Communicated with: Dr. Zhang, primary team

## 2023-06-09 NOTE — PROGRESS NOTE ADULT - ATTENDING COMMENTS
hypocalcemia with ekg changes and renal failure requiring HD. plan for hd cath placmenet. replete ca and check serial ekg for qtc monitoring.   rest as above.

## 2023-06-09 NOTE — PROGRESS NOTE ADULT - SUBJECTIVE AND OBJECTIVE BOX
Patient is a 37y Female seen and evaluated at bedside. Laying comfortably in bed. Denies any new symptoms.       Meds:    acetaminophen     Tablet .. 650 every 6 hours PRN  amLODIPine   Tablet 10 daily  budesonide 160 MICROgram(s)/formoterol 4.5 MICROgram(s) Inhaler 2 two times a day  calcitriol   Capsule 1 daily  calcium acetate 1334 three times a day with meals  calcium carbonate   1250 mG (OsCal) 1 two times a day  carvedilol 12.5 every 12 hours  levalbuterol Inhalation 0.31 every 6 hours PRN  lisinopril 10 daily  melatonin 3 at bedtime PRN  ondansetron Injectable 4 every 8 hours PRN  sodium bicarbonate 650 three times a day      T(C): , Max: 36.9 (23 @ 17:14)  T(F): , Max: 98.4 (23 @ 17:14)  HR: 149 (23 @ 16:41)  BP: 140/79 (23 @ 16:41)  BP(mean): 100 (23 @ 16:41)  RR: 14 (23 @ 16:41)  SpO2: 100% (23 @ 16:41)  Wt(kg): --     @ 07:01  -   @ 07:00  --------------------------------------------------------  IN: 458 mL / OUT: 0 mL / NET: 458 mL     @ 07:01  -   @ 17:34  --------------------------------------------------------  IN: 0 mL / OUT: 200 mL / NET: -200 mL          Review of Systems:  ROS negative except as per HPI      PHYSICAL EXAM:  GENERAL: Alert, awake, oriented x3 on RA   CHEST/LUNG: Bilateral clear breath sounds  HEART: Regular rate and rhythm, no murmur  ABDOMEN: Soft, nontender, non distended  EXTREMITIES: 1+ pitting edema LE  Neurology: AAOx3, mild asterixis       LABS:                        8.6    5.63  )-----------( 186      ( 2023 11:02 )             26.1         142  |  107  |  87<H>  ----------------------------<  82  4.1   |  18<L>  |  16.97<H>    Ca    6.6<L>      2023 11:02  Phos  7.6       Mg     2.0         TPro  5.9<L>  /  Alb  3.6  /  TBili  0.5  /  DBili  <0.2  /  AST  10  /  ALT  10  /  AlkPhos  57  09    Hepatitis B Surface Antibody: Nonreact ( @ 11:02)  Cholesterol, Serum: 113 mg/dL ( @ 20:55)    PT/INR - ( 2023 05:30 )   PT: 12.2 sec;   INR: 1.02          PTT - ( 2023 05:30 )  PTT:23.7 sec  Urinalysis Basic - ( 2023 15:23 )    Color: Yellow / Appearance: Clear / S.020 / pH: x  Gluc: x / Ketone: NEGATIVE  / Bili: Negative / Urobili: 0.2 E.U./dL   Blood: x / Protein: >=300 mg/dL / Nitrite: NEGATIVE   Leuk Esterase: Trace / RBC: 5-10 /HPF / WBC > 10 /HPF   Sq Epi: x / Non Sq Epi: x / Bacteria: Present /HPF            RADIOLOGY & ADDITIONAL STUDIES:

## 2023-06-10 LAB
ALBUMIN SERPL ELPH-MCNC: 3 G/DL — LOW (ref 3.3–5)
ALBUMIN SERPL ELPH-MCNC: 3.2 G/DL — LOW (ref 3.3–5)
ALP SERPL-CCNC: 48 U/L — SIGNIFICANT CHANGE UP (ref 40–120)
ALP SERPL-CCNC: 54 U/L — SIGNIFICANT CHANGE UP (ref 40–120)
ALT FLD-CCNC: 8 U/L — LOW (ref 10–45)
ALT FLD-CCNC: 9 U/L — LOW (ref 10–45)
ANION GAP SERPL CALC-SCNC: 16 MMOL/L — SIGNIFICANT CHANGE UP (ref 5–17)
ANION GAP SERPL CALC-SCNC: 17 MMOL/L — SIGNIFICANT CHANGE UP (ref 5–17)
AST SERPL-CCNC: 10 U/L — SIGNIFICANT CHANGE UP (ref 10–40)
AST SERPL-CCNC: 8 U/L — LOW (ref 10–40)
BASOPHILS # BLD AUTO: 0.01 K/UL — SIGNIFICANT CHANGE UP (ref 0–0.2)
BASOPHILS NFR BLD AUTO: 0.2 % — SIGNIFICANT CHANGE UP (ref 0–2)
BILIRUB DIRECT SERPL-MCNC: <0.2 MG/DL — SIGNIFICANT CHANGE UP (ref 0–0.3)
BILIRUB INDIRECT FLD-MCNC: SIGNIFICANT CHANGE UP MG/DL (ref 0.2–1)
BILIRUB SERPL-MCNC: 0.2 MG/DL — SIGNIFICANT CHANGE UP (ref 0.2–1.2)
BILIRUB SERPL-MCNC: 0.3 MG/DL — SIGNIFICANT CHANGE UP (ref 0.2–1.2)
BUN SERPL-MCNC: 89 MG/DL — HIGH (ref 7–23)
BUN SERPL-MCNC: 89 MG/DL — HIGH (ref 7–23)
CALCIUM SERPL-MCNC: 6.7 MG/DL — LOW (ref 8.4–10.5)
CALCIUM SERPL-MCNC: 7.1 MG/DL — LOW (ref 8.4–10.5)
CHLORIDE SERPL-SCNC: 108 MMOL/L — SIGNIFICANT CHANGE UP (ref 96–108)
CHLORIDE SERPL-SCNC: 108 MMOL/L — SIGNIFICANT CHANGE UP (ref 96–108)
CO2 SERPL-SCNC: 17 MMOL/L — LOW (ref 22–31)
CO2 SERPL-SCNC: 18 MMOL/L — LOW (ref 22–31)
CREAT SERPL-MCNC: 16.71 MG/DL — HIGH (ref 0.5–1.3)
CREAT SERPL-MCNC: 17.27 MG/DL — HIGH (ref 0.5–1.3)
EGFR: 2 ML/MIN/1.73M2 — LOW
EGFR: 3 ML/MIN/1.73M2 — LOW
EOSINOPHIL # BLD AUTO: 0.08 K/UL — SIGNIFICANT CHANGE UP (ref 0–0.5)
EOSINOPHIL NFR BLD AUTO: 1.5 % — SIGNIFICANT CHANGE UP (ref 0–6)
GAMMA INTERFERON BACKGROUND BLD IA-ACNC: 0.02 IU/ML — SIGNIFICANT CHANGE UP
GLUCOSE SERPL-MCNC: 109 MG/DL — HIGH (ref 70–99)
GLUCOSE SERPL-MCNC: 90 MG/DL — SIGNIFICANT CHANGE UP (ref 70–99)
HCT VFR BLD CALC: 25.3 % — LOW (ref 34.5–45)
HGB BLD-MCNC: 8.2 G/DL — LOW (ref 11.5–15.5)
IMM GRANULOCYTES NFR BLD AUTO: 0.4 % — SIGNIFICANT CHANGE UP (ref 0–0.9)
LYMPHOCYTES # BLD AUTO: 0.8 K/UL — LOW (ref 1–3.3)
LYMPHOCYTES # BLD AUTO: 15.4 % — SIGNIFICANT CHANGE UP (ref 13–44)
M TB IFN-G BLD-IMP: NEGATIVE — SIGNIFICANT CHANGE UP
M TB IFN-G CD4+ BCKGRND COR BLD-ACNC: 0.01 IU/ML — SIGNIFICANT CHANGE UP
M TB IFN-G CD4+CD8+ BCKGRND COR BLD-ACNC: 0.01 IU/ML — SIGNIFICANT CHANGE UP
MAGNESIUM SERPL-MCNC: 1.8 MG/DL — SIGNIFICANT CHANGE UP (ref 1.6–2.6)
MCHC RBC-ENTMCNC: 29.2 PG — SIGNIFICANT CHANGE UP (ref 27–34)
MCHC RBC-ENTMCNC: 32.4 GM/DL — SIGNIFICANT CHANGE UP (ref 32–36)
MCV RBC AUTO: 90 FL — SIGNIFICANT CHANGE UP (ref 80–100)
MONOCYTES # BLD AUTO: 0.47 K/UL — SIGNIFICANT CHANGE UP (ref 0–0.9)
MONOCYTES NFR BLD AUTO: 9.1 % — SIGNIFICANT CHANGE UP (ref 2–14)
NEUTROPHILS # BLD AUTO: 3.81 K/UL — SIGNIFICANT CHANGE UP (ref 1.8–7.4)
NEUTROPHILS NFR BLD AUTO: 73.4 % — SIGNIFICANT CHANGE UP (ref 43–77)
NRBC # BLD: 0 /100 WBCS — SIGNIFICANT CHANGE UP (ref 0–0)
PHOSPHATE SERPL-MCNC: 7.4 MG/DL — HIGH (ref 2.5–4.5)
PLATELET # BLD AUTO: 166 K/UL — SIGNIFICANT CHANGE UP (ref 150–400)
POTASSIUM SERPL-MCNC: 4.1 MMOL/L — SIGNIFICANT CHANGE UP (ref 3.5–5.3)
POTASSIUM SERPL-MCNC: 4.2 MMOL/L — SIGNIFICANT CHANGE UP (ref 3.5–5.3)
POTASSIUM SERPL-SCNC: 4.1 MMOL/L — SIGNIFICANT CHANGE UP (ref 3.5–5.3)
POTASSIUM SERPL-SCNC: 4.2 MMOL/L — SIGNIFICANT CHANGE UP (ref 3.5–5.3)
PROT SERPL-MCNC: 5.2 G/DL — LOW (ref 6–8.3)
PROT SERPL-MCNC: 5.4 G/DL — LOW (ref 6–8.3)
QUANT TB PLUS MITOGEN MINUS NIL: 0.61 IU/ML — SIGNIFICANT CHANGE UP
RBC # BLD: 2.81 M/UL — LOW (ref 3.8–5.2)
RBC # FLD: 13.2 % — SIGNIFICANT CHANGE UP (ref 10.3–14.5)
SODIUM SERPL-SCNC: 142 MMOL/L — SIGNIFICANT CHANGE UP (ref 135–145)
SODIUM SERPL-SCNC: 142 MMOL/L — SIGNIFICANT CHANGE UP (ref 135–145)
VIT D25+D1,25 OH+D1,25 PNL SERPL-MCNC: 21.8 PG/ML — SIGNIFICANT CHANGE UP (ref 19.9–79.3)
WBC # BLD: 5.19 K/UL — SIGNIFICANT CHANGE UP (ref 3.8–10.5)
WBC # FLD AUTO: 5.19 K/UL — SIGNIFICANT CHANGE UP (ref 3.8–10.5)

## 2023-06-10 PROCEDURE — 99233 SBSQ HOSP IP/OBS HIGH 50: CPT | Mod: GC

## 2023-06-10 RX ORDER — CALCIUM GLUCONATE 100 MG/ML
1 VIAL (ML) INTRAVENOUS ONCE
Refills: 0 | Status: COMPLETED | OUTPATIENT
Start: 2023-06-10 | End: 2023-06-10

## 2023-06-10 RX ORDER — CALCIUM GLUCONATE 100 MG/ML
2 VIAL (ML) INTRAVENOUS ONCE
Refills: 0 | Status: COMPLETED | OUTPATIENT
Start: 2023-06-10 | End: 2023-06-10

## 2023-06-10 RX ORDER — METOPROLOL TARTRATE 50 MG
5 TABLET ORAL ONCE
Refills: 0 | Status: COMPLETED | OUTPATIENT
Start: 2023-06-10 | End: 2023-06-10

## 2023-06-10 RX ADMIN — Medication 1334 MILLIGRAM(S): at 12:44

## 2023-06-10 RX ADMIN — Medication 5 MILLIGRAM(S): at 01:02

## 2023-06-10 RX ADMIN — Medication 650 MILLIGRAM(S): at 22:14

## 2023-06-10 RX ADMIN — LISINOPRIL 10 MILLIGRAM(S): 2.5 TABLET ORAL at 05:36

## 2023-06-10 RX ADMIN — AMLODIPINE BESYLATE 10 MILLIGRAM(S): 2.5 TABLET ORAL at 05:36

## 2023-06-10 RX ADMIN — Medication 1334 MILLIGRAM(S): at 08:48

## 2023-06-10 RX ADMIN — Medication 650 MILLIGRAM(S): at 14:43

## 2023-06-10 RX ADMIN — BUDESONIDE AND FORMOTEROL FUMARATE DIHYDRATE 2 PUFF(S): 160; 4.5 AEROSOL RESPIRATORY (INHALATION) at 07:58

## 2023-06-10 RX ADMIN — CARVEDILOL PHOSPHATE 12.5 MILLIGRAM(S): 80 CAPSULE, EXTENDED RELEASE ORAL at 11:47

## 2023-06-10 RX ADMIN — Medication 1 TABLET(S): at 19:38

## 2023-06-10 RX ADMIN — CALCITRIOL 1 MICROGRAM(S): 0.5 CAPSULE ORAL at 22:14

## 2023-06-10 RX ADMIN — ONDANSETRON 4 MILLIGRAM(S): 8 TABLET, FILM COATED ORAL at 06:29

## 2023-06-10 RX ADMIN — Medication 650 MILLIGRAM(S): at 01:12

## 2023-06-10 RX ADMIN — CALCITRIOL 1 MICROGRAM(S): 0.5 CAPSULE ORAL at 11:57

## 2023-06-10 RX ADMIN — Medication 5 MILLIGRAM(S): at 02:20

## 2023-06-10 RX ADMIN — Medication 1 TABLET(S): at 05:36

## 2023-06-10 RX ADMIN — Medication 650 MILLIGRAM(S): at 05:35

## 2023-06-10 RX ADMIN — CARVEDILOL PHOSPHATE 12.5 MILLIGRAM(S): 80 CAPSULE, EXTENDED RELEASE ORAL at 22:19

## 2023-06-10 RX ADMIN — ONDANSETRON 4 MILLIGRAM(S): 8 TABLET, FILM COATED ORAL at 17:13

## 2023-06-10 RX ADMIN — Medication 1334 MILLIGRAM(S): at 19:35

## 2023-06-10 RX ADMIN — Medication 200 GRAM(S): at 12:12

## 2023-06-10 RX ADMIN — BUDESONIDE AND FORMOTEROL FUMARATE DIHYDRATE 2 PUFF(S): 160; 4.5 AEROSOL RESPIRATORY (INHALATION) at 22:14

## 2023-06-10 NOTE — PROGRESS NOTE ADULT - PROBLEM SELECTOR PLAN 8
F: tolerating PO, no IVF  E: replete K<4, Mg<1.8, Phos<3, Ca<8.5   N: renal diet    VTE Prophylaxis: SCDs  GI: not needed  C: Full Code  D: 7Lach

## 2023-06-10 NOTE — PROGRESS NOTE ADULT - SUBJECTIVE AND OBJECTIVE BOX
OVERNIGHT EVENTS: Calcium 6.7, repleted w/ 2g calcium gluconate. EKG with QTc 500 ms. repeat Ca at 2 pm is 7.1. repleted calcium gluconate 1g. Per nephro, HD today and next on Monday.     SUBJECTIVE / INTERVAL HPI: Patient seen and examined at bedside. Feels palpitations when in SVTs, but no chest pain or dyspnea.     VITAL SIGNS:  Vital Signs Last 24 Hrs  T(C): 36.9 (10 Gamaliel 2023 17:15), Max: 36.9 (10 Gamaliel 2023 01:00)  T(F): 98.5 (10 Gamaliel 2023 17:15), Max: 98.5 (10 Gamaliel 2023 01:00)  HR: 81 (10 Gamaliel 2023 16:50) (74 - 152)  BP: 123/70 (10 Gamaliel 2023 16:50) (123/70 - 149/82)  BP(mean): 91 (10 Gamaliel 2023 16:50) (91 - 107)  RR: 18 (10 Gamaliel 2023 16:50) (16 - 18)  SpO2: 100% (10 Gamaliel 2023 16:50) (96% - 100%)    Parameters below as of 10 Gamaliel 2023 16:50  Patient On (Oxygen Delivery Method): room air        PHYSICAL EXAM:    Constitutional: young female resting comfortably; NAD  HEENT: NC/AT, anicteric sclera, MMM  Neck: supple  Respiratory: CTA B/L  Cardiac: +S1/S2; RRR  Gastrointestinal: soft, NT/ND  Extremities: WWP, no clubbing or cyanosis; 1+ b/l LE edema  Vascular: 2+ radial, DP/PT pulses B/L. R IJ Permacath in place, dressings c/d/i.   Dermatologic: skin warm, dry and intact  Neurologic: AAOx3, no focal deficits  Psychiatric: calm, cooperative, behaviors are appropriate    MEDICATIONS:  MEDICATIONS  (STANDING):  amLODIPine   Tablet 10 milliGRAM(s) Oral daily  budesonide 160 MICROgram(s)/formoterol 4.5 MICROgram(s) Inhaler 2 Puff(s) Inhalation two times a day  calcitriol   Capsule 1 MICROGram(s) Oral every 12 hours  calcium acetate 1334 milliGRAM(s) Oral three times a day with meals  calcium carbonate   1250 mG (OsCal) 1 Tablet(s) Oral two times a day  calcium gluconate IVPB 1 Gram(s) IV Intermittent once  carvedilol 12.5 milliGRAM(s) Oral every 12 hours  lisinopril 10 milliGRAM(s) Oral daily  sodium bicarbonate 650 milliGRAM(s) Oral three times a day  trimethobenzamide 300 milliGRAM(s) Oral every 8 hours    MEDICATIONS  (PRN):  acetaminophen     Tablet .. 650 milliGRAM(s) Oral every 6 hours PRN Temp greater or equal to 38C (100.4F), Mild Pain (1 - 3)  levalbuterol Inhalation 0.31 milliGRAM(s) Inhalation every 6 hours PRN shortness of breath  melatonin 3 milliGRAM(s) Oral at bedtime PRN Insomnia  ondansetron Injectable 4 milliGRAM(s) IV Push every 8 hours PRN Nausea and/or Vomiting      ALLERGIES:  Allergies    No Known Allergies    Intolerances        LABS:                        8.2    5.19  )-----------( 166      ( 10 Gamaliel 2023 05:30 )             25.3     06-10    142  |  108  |  89<H>  ----------------------------<  109<H>  4.2   |  17<L>  |  16.71<H>    Ca    7.1<L>      10 Gamaliel 2023 14:00  Phos  7.4     06-10  Mg     1.8     06-10    TPro  5.4<L>  /  Alb  3.2<L>  /  TBili  0.2  /  DBili  <0.2  /  AST  10  /  ALT  9<L>  /  AlkPhos  54  06-10    PT/INR - ( 09 Jun 2023 05:30 )   PT: 12.2 sec;   INR: 1.02          PTT - ( 09 Jun 2023 05:30 )  PTT:23.7 sec    CAPILLARY BLOOD GLUCOSE          RADIOLOGY & ADDITIONAL TESTS: Reviewed.

## 2023-06-10 NOTE — PROGRESS NOTE ADULT - PROBLEM SELECTOR PLAN 1
Patient found to have hypocalcemia to 5.3 on admission with EKG changes (prolonged QTc), admitted to telemetry. Likely 2/2 worsening renal failure. S/p 1g Ca x2  - 2g calcium IV  - f/u repeat Ca in AM  - phoslo TID  - monitor EKGs  - f/u PTH, vit D studies Patient found to have hypocalcemia to 5.3 on admission with EKG changes (prolonged QTc), admitted to telemetry. Likely 2/2 worsening renal failure. S/p 1g Ca x2  On 6/10 AM: sCa 6.7, gave 2g Ca, improved to 7.1. PTH: 792(H), VitD-25: 28.8(L), VitD-1,25: 21.8.  - D/w Nephro, will use higher calcium bath for HD iso hypoCa2+  - f/u repeat Ca in AM  - phoslo TID  - monitor EKGs

## 2023-06-10 NOTE — PROGRESS NOTE ADULT - PROBLEM SELECTOR PLAN 2
#New HD initiation  Patient presenting from outpatient PCP provider with elevated Cr and BUN for dialysis initiation. Nephrology consulted. On admission Cr elevated to 16.98 w/ BUN 94. Sxs of fatigue. 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 Neves. Home mediations: bicarb 650 TID, calcitriol 0.25 daily, vit D2 50K units weekly  - f/u nephro recs  - IR for permacath  - vascular consult for vein mapping  - f/u quant   - c/w home bicarb and calcitriol  - hold vit D2 50K inpatient, restart as outpatient  - renal diet  - social work consult for HD unit #New HD initiation  Patient presenting from outpatient PCP provider with elevated Cr and BUN for dialysis initiation. Nephrology consulted. On admission Cr elevated to 16.98 w/ BUN 94. Sxs of fatigue. 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 Neves. Home mediations: bicarb 650 TID, calcitriol 0.25 daily, vit D2 50K units weekly  s/p R IJ Permacath 6/9, s/p 1st HD session 6/10 - tolerated well.    - f/u nephro recs    - vascular consult for vein mapping  - f/u quant   - c/w home bicarb and calcitriol  - hold vit D2 50K inpatient, restart as outpatient  - renal diet  - social work consult for HD unit

## 2023-06-10 NOTE — PROGRESS NOTE ADULT - SUBJECTIVE AND OBJECTIVE BOX
CC: RENAL FAILURE, ACUTE ONCHRONIC; HYPOCALCEMIA        INTERVAL HISTORY: eating breakfast  no complaints      ROS: No chest pain, no sob, no abd pain. No n/v/d    PAST MEDICAL & SURGICAL HISTORY:  Asthma    HTN (hypertension)    Thyroid disease    Morbid obesity    Nephropathy    History of cholecystectomy    Induced termination of pregnancy  x 2    H/O:  section        PHYSICAL EXAM:  T(C): 36.9 (06-10-23 @ 06:17), Max: 36.9 (23 @ 17:14)  HR: 88 (06-10-23 @ 04:19)  BP: 136/88 (06-10-23 @ 03:33) (118/62 - 149/82)  RR: 16 (06-10-23 @ 04:19)  SpO2: 98% (06-10-23 @ 04:19)  Wt(kg): --  I&O's Summary    2023 07:01  -  10 Gamaliel 2023 07:00  --------------------------------------------------------  IN: 0 mL / OUT: 500 mL / NET: -500 mL      Weight 98.4 ( @ 13:07)  General: AAO x 3,  NAD.  HEENT: moist mucous membranes, no pallor/cyanosis.  Neck: no JVD visible.  Cardiac: S1, S2. RRR. No murmurs   Respratory: CTA b/l, no access muscle use.   Abdomen: soft. nontender. nondistended  Skin: no rashes.  Extremities: no LE edema b/l  Access: R Permacath      DATA:                        8.2<L>  5.19  )-----------( 166      ( 10 Gamaliel 2023 05:30 )             25.3<L>    Ferritin, Serum: 95 ng/mL ( @ 20:55)      142    |  108    |  89<H>  ----------------------------<  90     Ca:6.7<L> (10 Gamaliel 2023 05:30)  4.1     |  18<L>  |  17.27<H>        TPro  5.2<L>  /  Alb  3.0<L>  /  TBili  0.3    /  DBili  x      /  AST  8<L>   /  ALT  8<L>   /  AlkPhos  48     10 Gamaliel 2023 05:30      Vitamin D, 25-Hydroxy: 28.8 ng/mL *L* [30.0 - 80.0] ( @ 20:55)  Vitamin D, 1, 25-Dihydroxy: 21.8 pg/mL [19.9 - 79.3] ( @ 20:55)    Urinalysis Basic - ( 2023 15:23 )  Color: Yellow / Appearance: Clear / S.020 / pH: x  Gluc: x / Ketone: NEGATIVE  / Bili: Negative / Urobili: 0.2 E.U./dL   Blood: x / Protein: >=300 mg/dL<!> / Nitrite: NEGATIVE   Leuk Esterase: Trace<!> / RBC: 5-10 /HPF<!> / WBC > 10 /HPF<!>   Sq Epi: x / Non Sq Epi: x / Bacteria: Present /HPF<!>                MEDICATIONS  (STANDING):  amLODIPine   Tablet 10 milliGRAM(s) Oral daily  budesonide 160 MICROgram(s)/formoterol 4.5 MICROgram(s) Inhaler 2 Puff(s) Inhalation two times a day  calcitriol   Capsule 1 MICROGram(s) Oral every 12 hours  calcium acetate 1334 milliGRAM(s) Oral three times a day with meals  calcium carbonate   1250 mG (OsCal) 1 Tablet(s) Oral two times a day  calcium gluconate IVPB 2 Gram(s) IV Intermittent once  carvedilol 12.5 milliGRAM(s) Oral every 12 hours  lisinopril 10 milliGRAM(s) Oral daily  sodium bicarbonate 650 milliGRAM(s) Oral three times a day  trimethobenzamide 300 milliGRAM(s) Oral every 8 hours    MEDICATIONS  (PRN):  acetaminophen     Tablet .. 650 milliGRAM(s) Oral every 6 hours PRN Temp greater or equal to 38C (100.4F), Mild Pain (1 - 3)  levalbuterol Inhalation 0.31 milliGRAM(s) Inhalation every 6 hours PRN shortness of breath  melatonin 3 milliGRAM(s) Oral at bedtime PRN Insomnia  ondansetron Injectable 4 milliGRAM(s) IV Push every 8 hours PRN Nausea and/or Vomiting

## 2023-06-11 LAB
ALBUMIN SERPL ELPH-MCNC: 3 G/DL — LOW (ref 3.3–5)
ALP SERPL-CCNC: 45 U/L — SIGNIFICANT CHANGE UP (ref 40–120)
ALT FLD-CCNC: 8 U/L — LOW (ref 10–45)
ANION GAP SERPL CALC-SCNC: 15 MMOL/L — SIGNIFICANT CHANGE UP (ref 5–17)
AST SERPL-CCNC: 7 U/L — LOW (ref 10–40)
BASOPHILS # BLD AUTO: 0.01 K/UL — SIGNIFICANT CHANGE UP (ref 0–0.2)
BASOPHILS NFR BLD AUTO: 0.2 % — SIGNIFICANT CHANGE UP (ref 0–2)
BILIRUB SERPL-MCNC: 0.2 MG/DL — SIGNIFICANT CHANGE UP (ref 0.2–1.2)
BUN SERPL-MCNC: 63 MG/DL — HIGH (ref 7–23)
CALCIUM SERPL-MCNC: 7.8 MG/DL — LOW (ref 8.4–10.5)
CHLORIDE SERPL-SCNC: 107 MMOL/L — SIGNIFICANT CHANGE UP (ref 96–108)
CO2 SERPL-SCNC: 23 MMOL/L — SIGNIFICANT CHANGE UP (ref 22–31)
CREAT SERPL-MCNC: 12.57 MG/DL — HIGH (ref 0.5–1.3)
EGFR: 4 ML/MIN/1.73M2 — LOW
EOSINOPHIL # BLD AUTO: 0.07 K/UL — SIGNIFICANT CHANGE UP (ref 0–0.5)
EOSINOPHIL NFR BLD AUTO: 1.2 % — SIGNIFICANT CHANGE UP (ref 0–6)
GLUCOSE SERPL-MCNC: 83 MG/DL — SIGNIFICANT CHANGE UP (ref 70–99)
HCT VFR BLD CALC: 23.9 % — LOW (ref 34.5–45)
HGB BLD-MCNC: 7.8 G/DL — LOW (ref 11.5–15.5)
IMM GRANULOCYTES NFR BLD AUTO: 0.5 % — SIGNIFICANT CHANGE UP (ref 0–0.9)
LYMPHOCYTES # BLD AUTO: 1.1 K/UL — SIGNIFICANT CHANGE UP (ref 1–3.3)
LYMPHOCYTES # BLD AUTO: 19 % — SIGNIFICANT CHANGE UP (ref 13–44)
MAGNESIUM SERPL-MCNC: 1.7 MG/DL — SIGNIFICANT CHANGE UP (ref 1.6–2.6)
MCHC RBC-ENTMCNC: 29.3 PG — SIGNIFICANT CHANGE UP (ref 27–34)
MCHC RBC-ENTMCNC: 32.6 GM/DL — SIGNIFICANT CHANGE UP (ref 32–36)
MCV RBC AUTO: 89.8 FL — SIGNIFICANT CHANGE UP (ref 80–100)
MONOCYTES # BLD AUTO: 0.54 K/UL — SIGNIFICANT CHANGE UP (ref 0–0.9)
MONOCYTES NFR BLD AUTO: 9.3 % — SIGNIFICANT CHANGE UP (ref 2–14)
NEUTROPHILS # BLD AUTO: 4.03 K/UL — SIGNIFICANT CHANGE UP (ref 1.8–7.4)
NEUTROPHILS NFR BLD AUTO: 69.8 % — SIGNIFICANT CHANGE UP (ref 43–77)
NRBC # BLD: 0 /100 WBCS — SIGNIFICANT CHANGE UP (ref 0–0)
PHOSPHATE SERPL-MCNC: 5.5 MG/DL — HIGH (ref 2.5–4.5)
PLATELET # BLD AUTO: 100 K/UL — LOW (ref 150–400)
POTASSIUM SERPL-MCNC: 3.9 MMOL/L — SIGNIFICANT CHANGE UP (ref 3.5–5.3)
POTASSIUM SERPL-SCNC: 3.9 MMOL/L — SIGNIFICANT CHANGE UP (ref 3.5–5.3)
PROT SERPL-MCNC: 5.1 G/DL — LOW (ref 6–8.3)
RBC # BLD: 2.66 M/UL — LOW (ref 3.8–5.2)
RBC # FLD: 13.2 % — SIGNIFICANT CHANGE UP (ref 10.3–14.5)
SODIUM SERPL-SCNC: 145 MMOL/L — SIGNIFICANT CHANGE UP (ref 135–145)
WBC # BLD: 5.78 K/UL — SIGNIFICANT CHANGE UP (ref 3.8–10.5)
WBC # FLD AUTO: 5.78 K/UL — SIGNIFICANT CHANGE UP (ref 3.8–10.5)

## 2023-06-11 PROCEDURE — 93970 EXTREMITY STUDY: CPT | Mod: 26

## 2023-06-11 PROCEDURE — 99233 SBSQ HOSP IP/OBS HIGH 50: CPT | Mod: GC

## 2023-06-11 RX ADMIN — Medication 650 MILLIGRAM(S): at 23:34

## 2023-06-11 RX ADMIN — AMLODIPINE BESYLATE 10 MILLIGRAM(S): 2.5 TABLET ORAL at 07:04

## 2023-06-11 RX ADMIN — Medication 1334 MILLIGRAM(S): at 07:07

## 2023-06-11 RX ADMIN — Medication 650 MILLIGRAM(S): at 07:03

## 2023-06-11 RX ADMIN — CARVEDILOL PHOSPHATE 12.5 MILLIGRAM(S): 80 CAPSULE, EXTENDED RELEASE ORAL at 10:50

## 2023-06-11 RX ADMIN — BUDESONIDE AND FORMOTEROL FUMARATE DIHYDRATE 2 PUFF(S): 160; 4.5 AEROSOL RESPIRATORY (INHALATION) at 17:21

## 2023-06-11 RX ADMIN — Medication 1334 MILLIGRAM(S): at 13:08

## 2023-06-11 RX ADMIN — LISINOPRIL 10 MILLIGRAM(S): 2.5 TABLET ORAL at 07:04

## 2023-06-11 RX ADMIN — BUDESONIDE AND FORMOTEROL FUMARATE DIHYDRATE 2 PUFF(S): 160; 4.5 AEROSOL RESPIRATORY (INHALATION) at 07:04

## 2023-06-11 RX ADMIN — CALCITRIOL 1 MICROGRAM(S): 0.5 CAPSULE ORAL at 10:49

## 2023-06-11 RX ADMIN — Medication 650 MILLIGRAM(S): at 13:12

## 2023-06-11 RX ADMIN — Medication 1 TABLET(S): at 07:04

## 2023-06-11 RX ADMIN — CALCITRIOL 1 MICROGRAM(S): 0.5 CAPSULE ORAL at 23:35

## 2023-06-11 RX ADMIN — CARVEDILOL PHOSPHATE 12.5 MILLIGRAM(S): 80 CAPSULE, EXTENDED RELEASE ORAL at 23:34

## 2023-06-11 RX ADMIN — Medication 200 GRAM(S): at 00:48

## 2023-06-11 RX ADMIN — Medication 1 TABLET(S): at 17:21

## 2023-06-11 RX ADMIN — Medication 650 MILLIGRAM(S): at 01:48

## 2023-06-11 RX ADMIN — Medication 650 MILLIGRAM(S): at 23:35

## 2023-06-11 RX ADMIN — Medication 1334 MILLIGRAM(S): at 16:46

## 2023-06-11 NOTE — PROGRESS NOTE ADULT - PROBLEM SELECTOR PLAN 2
#New HD initiation  Patient presenting from outpatient PCP provider with elevated Cr and BUN for dialysis initiation. Nephrology consulted. On admission Cr elevated to 16.98 w/ BUN 94. Sxs of fatigue. 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 Neves. Home mediations: bicarb 650 TID, calcitriol 0.25 daily, vit D2 50K units weekly  s/p R IJ Permacath 6/9, s/p 1st HD session 6/10 - tolerated well.    - f/u nephro recs    - vascular consult for vein mapping  - f/u quant   - c/w home bicarb and calcitriol  - hold vit D2 50K inpatient, restart as outpatient  - renal diet  - social work consult for HD unit #New HD initiation  Patient presenting from outpatient PCP provider with elevated Cr and BUN for dialysis initiation. Nephrology consulted. On admission Cr elevated to 16.98 w/ BUN 94. Sxs of fatigue. 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 Neves. Home mediations: bicarb 650 TID, calcitriol 0.25 daily, vit D2 50K units weekly  s/p R IJ Permacath 6/9, s/p 1st HD session 6/10 - tolerated well.    - f/u nephro recs  - vascular consult for vein mapping  - f/u quant   - c/w home bicarb and calcitriol  - hold vit D2 50K inpatient, restart as outpatient  - renal diet  - social work consult for HD unit

## 2023-06-11 NOTE — PROGRESS NOTE ADULT - SUBJECTIVE AND OBJECTIVE BOX
CC: The patient is a 37y Female admitted for     INTERVAL EVENTS: KRYSTA    SUBJECTIVE / INTERVAL HPI: Patient seen and examined at bedside.     ROS: negative unless otherwise stated above.    VITAL SIGNS:  Vital Signs Last 24 Hrs  T(C): 36.9 (11 Jun 2023 05:19), Max: 37.9 (10 Gamaliel 2023 22:05)  T(F): 98.5 (11 Jun 2023 05:19), Max: 100.3 (10 Gamaliel 2023 22:05)  HR: 64 (11 Jun 2023 04:37) (64 - 92)  BP: 145/75 (11 Jun 2023 04:37) (122/75 - 145/75)  BP(mean): 105 (11 Jun 2023 04:37) (87 - 105)  RR: 16 (11 Jun 2023 04:37) (16 - 18)  SpO2: 100% (11 Jun 2023 04:37) (100% - 100%)    Parameters below as of 11 Jun 2023 04:37  Patient On (Oxygen Delivery Method): room air            PHYSICAL EXAM:  Constitutional: resting comfortably; NAD  HEENT: NC/AT, PERRL, EOMI, anicteric sclera, MMM  Neck: supple; no JVD or thyromegaly  Respiratory: CTA B/L; no W/R/R, no retractions  Cardiac: +S1/S2; RRR; no M/R/G  Gastrointestinal: soft, NT/ND; no rebound or guarding; +BSx4  Extremities: WWP, no clubbing or cyanosis; no peripheral edema  Musculoskeletal: NROM x4; no joint swelling, tenderness or erythema  Vascular: 2+ radial, DP/PT pulses B/L  Dermatologic: skin warm, dry and intact; no rashes, wounds, or scars  Neurologic: AAOx3, no focal deficits  Psychiatric: calm, cooperative, behaviors are appropriate, denies SI/HI    MEDICATIONS:  MEDICATIONS  (STANDING):  amLODIPine   Tablet 10 milliGRAM(s) Oral daily  budesonide 160 MICROgram(s)/formoterol 4.5 MICROgram(s) Inhaler 2 Puff(s) Inhalation two times a day  calcitriol   Capsule 1 MICROGram(s) Oral every 12 hours  calcium acetate 1334 milliGRAM(s) Oral three times a day with meals  calcium carbonate   1250 mG (OsCal) 1 Tablet(s) Oral two times a day  carvedilol 12.5 milliGRAM(s) Oral every 12 hours  lisinopril 10 milliGRAM(s) Oral daily  sodium bicarbonate 650 milliGRAM(s) Oral three times a day  trimethobenzamide 300 milliGRAM(s) Oral every 8 hours    MEDICATIONS  (PRN):  acetaminophen     Tablet .. 650 milliGRAM(s) Oral every 6 hours PRN Temp greater or equal to 38C (100.4F), Mild Pain (1 - 3)  levalbuterol Inhalation 0.31 milliGRAM(s) Inhalation every 6 hours PRN shortness of breath  melatonin 3 milliGRAM(s) Oral at bedtime PRN Insomnia  ondansetron Injectable 4 milliGRAM(s) IV Push every 8 hours PRN Nausea and/or Vomiting      ALLERGIES:  Allergies    No Known Allergies    Intolerances        LABS:                        7.8    5.78  )-----------( 100      ( 11 Jun 2023 05:30 )             23.9     06-11    145  |  107  |  x   ----------------------------<  x   3.9   |  x   |  x     Ca    7.1<L>      10 Gamaliel 2023 14:00  Phos  7.4     06-10  Mg     1.7     06-11    TPro  5.4<L>  /  Alb  3.2<L>  /  TBili  0.2  /  DBili  <0.2  /  AST  10  /  ALT  9<L>  /  AlkPhos  54  06-10      RADIOLOGY & ADDITIONAL TESTS: Reviewed.   CC: The patient is a 37y Female admitted for hypocalcemia and HD initiation.    INTERVAL EVENTS: KRYSTA    SUBJECTIVE / INTERVAL HPI: Patient seen and examined at bedside. Feeling fine with no complaints.     ROS: negative unless otherwise stated above.    VITAL SIGNS:  Vital Signs Last 24 Hrs  T(C): 36.9 (11 Jun 2023 05:19), Max: 37.9 (10 Gamaliel 2023 22:05)  T(F): 98.5 (11 Jun 2023 05:19), Max: 100.3 (10 Gamaliel 2023 22:05)  HR: 64 (11 Jun 2023 04:37) (64 - 92)  BP: 145/75 (11 Jun 2023 04:37) (122/75 - 145/75)  BP(mean): 105 (11 Jun 2023 04:37) (87 - 105)  RR: 16 (11 Jun 2023 04:37) (16 - 18)  SpO2: 100% (11 Jun 2023 04:37) (100% - 100%)    Parameters below as of 11 Jun 2023 04:37  Patient On (Oxygen Delivery Method): room air      PHYSICAL EXAM:  Constitutional: young female resting comfortably; NAD  HEENT: NC/AT, anicteric sclera, MMM  Neck: supple  Respiratory: CTA B/L  Cardiac: +S1/S2; RRR  Gastrointestinal: soft, NT/ND  Extremities: WWP, no clubbing or cyanosis; 1+ b/l LE edema  Vascular: 2+ radial, DP/PT pulses B/L. R IJ Permacath in place, dressings c/d/i.   Dermatologic: skin warm, dry and intact  Neurologic: AAOx3, no focal deficits  Psychiatric: calm, cooperative, behaviors are appropriate      MEDICATIONS:  MEDICATIONS  (STANDING):  amLODIPine   Tablet 10 milliGRAM(s) Oral daily  budesonide 160 MICROgram(s)/formoterol 4.5 MICROgram(s) Inhaler 2 Puff(s) Inhalation two times a day  calcitriol   Capsule 1 MICROGram(s) Oral every 12 hours  calcium acetate 1334 milliGRAM(s) Oral three times a day with meals  calcium carbonate   1250 mG (OsCal) 1 Tablet(s) Oral two times a day  carvedilol 12.5 milliGRAM(s) Oral every 12 hours  lisinopril 10 milliGRAM(s) Oral daily  sodium bicarbonate 650 milliGRAM(s) Oral three times a day  trimethobenzamide 300 milliGRAM(s) Oral every 8 hours    MEDICATIONS  (PRN):  acetaminophen     Tablet .. 650 milliGRAM(s) Oral every 6 hours PRN Temp greater or equal to 38C (100.4F), Mild Pain (1 - 3)  levalbuterol Inhalation 0.31 milliGRAM(s) Inhalation every 6 hours PRN shortness of breath  melatonin 3 milliGRAM(s) Oral at bedtime PRN Insomnia  ondansetron Injectable 4 milliGRAM(s) IV Push every 8 hours PRN Nausea and/or Vomiting      ALLERGIES:  Allergies    No Known Allergies    Intolerances        LABS:                        7.8    5.78  )-----------( 100      ( 11 Jun 2023 05:30 )             23.9     06-11    145  |  107  |  x   ----------------------------<  x   3.9   |  x   |  x     Ca    7.1<L>      10 Gamaliel 2023 14:00  Phos  7.4     06-10  Mg     1.7     06-11    TPro  5.4<L>  /  Alb  3.2<L>  /  TBili  0.2  /  DBili  <0.2  /  AST  10  /  ALT  9<L>  /  AlkPhos  54  06-10      RADIOLOGY & ADDITIONAL TESTS: Reviewed.

## 2023-06-11 NOTE — PROGRESS NOTE ADULT - TIME BILLING
And required hemodialysis yesterday.  The corrected calcium is stable.  Follow-up in the QT.  Patient is scheduled for dialysis tomorrow.  Out of bed in chair.  The blood pressure is stable.  Follow-up on repeat EKG  Patient seen and examined with house-staff during bedside rounds.  Resident note read, including vitals, physical findings, laboratory data, and radiological reports.   Revisions included below.  Direct personal management at bed side and extensive interpretation of the data.  Plan was outlined and discussed in details with the housestaff.  Decision making of high complexity  Action taken for acute disease activity to reflect the level of care provided:  - medication reconciliation  - review laboratory data
Patient seen and examined with house-staff during bedside rounds.  Resident note read, including vitals, physical findings, laboratory data, and radiological reports.   Revisions included below.  Direct personal management at bed side and extensive interpretation of the data.  Plan was outlined and discussed in details with the housestaff.  Decision making of high complexity  Action taken for acute disease activity to reflect the level of care provided:  - medication reconciliation  - review laboratory data  Patient is clinically stable.  The patient received IV calcium and she is on oral calcium.  The level is pending posttransfusion.  The patient will get the hemodialysis today.  The patient had a run of SVT.  We will follow-up with EKG.  Patient might need EP evaluation
case complexity as above.

## 2023-06-11 NOTE — PROGRESS NOTE ADULT - PROBLEM SELECTOR PLAN 1
Patient found to have hypocalcemia to 5.3 on admission with EKG changes (prolonged QTc), admitted to telemetry. Likely 2/2 worsening renal failure. S/p 1g Ca x2  On 6/10 AM: sCa 6.7, gave 2g Ca, improved to 7.1. PTH: 792(H), VitD-25: 28.8(L), VitD-1,25: 21.8.  - D/w Nephro, will use higher calcium bath for HD iso hypoCa2+  - f/u repeat Ca in AM  - phoslo TID  - monitor EKGs RESOLVING. Patient found to have hypocalcemia to 5.3 on admission with EKG changes (prolonged QTc), admitted to telemetry. Likely 2/2 worsening renal failure. S/p 1g Ca x2. QTc improving as Ca2+ does.  On 6/10 AM: sCa 6.7, gave 2g Ca, improved to 7.1. PTH: 792(H), VitD-25: 28.8(L), VitD-1,25: 21.8.  - D/w Nephro, will use higher calcium bath for HD iso hypoCa2+  - 6/11 QTc 459  - phoslo TID  - monitor EKGs

## 2023-06-11 NOTE — PROGRESS NOTE ADULT - SUBJECTIVE AND OBJECTIVE BOX
Patient is a 37y Female seen and evaluated at bedside. No acute distress, does not offer any complaints. States that she feels well. Tolerated 1sesson of HD 6/10.       Meds:    acetaminophen     Tablet .. 650 every 6 hours PRN  amLODIPine   Tablet 10 daily  budesonide 160 MICROgram(s)/formoterol 4.5 MICROgram(s) Inhaler 2 two times a day  calcitriol   Capsule 1 every 12 hours  calcium acetate 1334 three times a day with meals  calcium carbonate   1250 mG (OsCal) 1 two times a day  carvedilol 12.5 every 12 hours  levalbuterol Inhalation 0.31 every 6 hours PRN  lisinopril 10 daily  melatonin 3 at bedtime PRN  ondansetron Injectable 4 every 8 hours PRN  sodium bicarbonate 650 three times a day  trimethobenzamide 300 every 8 hours      T(C): , Max: 37.9 (06-10-23 @ 22:05)  T(F): , Max: 100.3 (06-10-23 @ 22:05)  HR: 88 (06-11-23 @ 08:14)  BP: 136/74 (06-11-23 @ 08:14)  BP(mean): 99 (06-11-23 @ 08:14)  RR: 18 (06-11-23 @ 08:14)  SpO2: 100% (06-11-23 @ 08:14)  Wt(kg): --        Review of Systems:  ROS negative except as per HPI      PHYSICAL EXAM:  General: AAO x 3,  NAD.  HEENT: moist mucous membranes, no pallor/cyanosis.  Neck: no JVD visible.  Cardiac: S1, S2. RRR. No murmurs   Respratory: CTA b/l, no access muscle use.   Abdomen: soft. nontender. nondistended  Skin: no rashes.  Extremities: no LE edema b/l  Access: R Permacath    LABS:                        7.8    5.78  )-----------( 100      ( 11 Jun 2023 05:30 )             23.9     06-11    145  |  107  |  63<H>  ----------------------------<  83  3.9   |  23  |  12.57<H>    Ca    7.8<L>      11 Jun 2023 05:30  Phos  5.5     06-11  Mg     1.7     06-11    TPro  5.1<L>  /  Alb  3.0<L>  /  TBili  0.2  /  DBili  x   /  AST  7<L>  /  ALT  8<L>  /  AlkPhos  45  06-11                RADIOLOGY & ADDITIONAL STUDIES:

## 2023-06-12 ENCOUNTER — TRANSCRIPTION ENCOUNTER (OUTPATIENT)
Age: 38
End: 2023-06-12

## 2023-06-12 LAB
ALBUMIN SERPL ELPH-MCNC: 3.1 G/DL — LOW (ref 3.3–5)
ALP SERPL-CCNC: 46 U/L — SIGNIFICANT CHANGE UP (ref 40–120)
ALT FLD-CCNC: 8 U/L — LOW (ref 10–45)
ANION GAP SERPL CALC-SCNC: 12 MMOL/L — SIGNIFICANT CHANGE UP (ref 5–17)
AST SERPL-CCNC: 7 U/L — LOW (ref 10–40)
BASOPHILS # BLD AUTO: 0.01 K/UL — SIGNIFICANT CHANGE UP (ref 0–0.2)
BASOPHILS NFR BLD AUTO: 0.2 % — SIGNIFICANT CHANGE UP (ref 0–2)
BILIRUB SERPL-MCNC: 0.2 MG/DL — SIGNIFICANT CHANGE UP (ref 0.2–1.2)
BLD GP AB SCN SERPL QL: NEGATIVE — SIGNIFICANT CHANGE UP
BUN SERPL-MCNC: 65 MG/DL — HIGH (ref 7–23)
CALCIUM SERPL-MCNC: 7.6 MG/DL — LOW (ref 8.4–10.5)
CHLORIDE SERPL-SCNC: 106 MMOL/L — SIGNIFICANT CHANGE UP (ref 96–108)
CO2 SERPL-SCNC: 25 MMOL/L — SIGNIFICANT CHANGE UP (ref 22–31)
CREAT SERPL-MCNC: 13.45 MG/DL — HIGH (ref 0.5–1.3)
EGFR: 3 ML/MIN/1.73M2 — LOW
EOSINOPHIL # BLD AUTO: 0.11 K/UL — SIGNIFICANT CHANGE UP (ref 0–0.5)
EOSINOPHIL NFR BLD AUTO: 2 % — SIGNIFICANT CHANGE UP (ref 0–6)
GLUCOSE SERPL-MCNC: 86 MG/DL — SIGNIFICANT CHANGE UP (ref 70–99)
HCT VFR BLD CALC: 23.5 % — LOW (ref 34.5–45)
HGB BLD-MCNC: 7.6 G/DL — LOW (ref 11.5–15.5)
IMM GRANULOCYTES NFR BLD AUTO: 0.4 % — SIGNIFICANT CHANGE UP (ref 0–0.9)
LYMPHOCYTES # BLD AUTO: 1.23 K/UL — SIGNIFICANT CHANGE UP (ref 1–3.3)
LYMPHOCYTES # BLD AUTO: 22.1 % — SIGNIFICANT CHANGE UP (ref 13–44)
MAGNESIUM SERPL-MCNC: 1.8 MG/DL — SIGNIFICANT CHANGE UP (ref 1.6–2.6)
MCHC RBC-ENTMCNC: 29.2 PG — SIGNIFICANT CHANGE UP (ref 27–34)
MCHC RBC-ENTMCNC: 32.3 GM/DL — SIGNIFICANT CHANGE UP (ref 32–36)
MCV RBC AUTO: 90.4 FL — SIGNIFICANT CHANGE UP (ref 80–100)
MONOCYTES # BLD AUTO: 0.57 K/UL — SIGNIFICANT CHANGE UP (ref 0–0.9)
MONOCYTES NFR BLD AUTO: 10.3 % — SIGNIFICANT CHANGE UP (ref 2–14)
NEUTROPHILS # BLD AUTO: 3.62 K/UL — SIGNIFICANT CHANGE UP (ref 1.8–7.4)
NEUTROPHILS NFR BLD AUTO: 65 % — SIGNIFICANT CHANGE UP (ref 43–77)
NRBC # BLD: 0 /100 WBCS — SIGNIFICANT CHANGE UP (ref 0–0)
PHOSPHATE SERPL-MCNC: 5.6 MG/DL — HIGH (ref 2.5–4.5)
PLATELET # BLD AUTO: 102 K/UL — LOW (ref 150–400)
POTASSIUM SERPL-MCNC: 4.2 MMOL/L — SIGNIFICANT CHANGE UP (ref 3.5–5.3)
POTASSIUM SERPL-SCNC: 4.2 MMOL/L — SIGNIFICANT CHANGE UP (ref 3.5–5.3)
PROT SERPL-MCNC: 5.2 G/DL — LOW (ref 6–8.3)
RBC # BLD: 2.6 M/UL — LOW (ref 3.8–5.2)
RBC # FLD: 13.2 % — SIGNIFICANT CHANGE UP (ref 10.3–14.5)
RH IG SCN BLD-IMP: POSITIVE — SIGNIFICANT CHANGE UP
SODIUM SERPL-SCNC: 143 MMOL/L — SIGNIFICANT CHANGE UP (ref 135–145)
WBC # BLD: 5.56 K/UL — SIGNIFICANT CHANGE UP (ref 3.8–10.5)
WBC # FLD AUTO: 5.56 K/UL — SIGNIFICANT CHANGE UP (ref 3.8–10.5)

## 2023-06-12 PROCEDURE — 99233 SBSQ HOSP IP/OBS HIGH 50: CPT | Mod: GC

## 2023-06-12 PROCEDURE — 90935 HEMODIALYSIS ONE EVALUATION: CPT

## 2023-06-12 RX ORDER — FERROUS SULFATE 325(65) MG
325 TABLET ORAL DAILY
Refills: 0 | Status: DISCONTINUED | OUTPATIENT
Start: 2023-06-13 | End: 2023-06-14

## 2023-06-12 RX ORDER — CALCIUM CARBONATE 500(1250)
1 TABLET ORAL
Qty: 60 | Refills: 0
Start: 2023-06-12 | End: 2023-07-11

## 2023-06-12 RX ORDER — ERYTHROPOIETIN 10000 [IU]/ML
10000 INJECTION, SOLUTION INTRAVENOUS; SUBCUTANEOUS ONCE
Refills: 0 | Status: COMPLETED | OUTPATIENT
Start: 2023-06-12 | End: 2023-06-12

## 2023-06-12 RX ORDER — FERROUS SULFATE 325(65) MG
1 TABLET ORAL
Qty: 30 | Refills: 0
Start: 2023-06-12 | End: 2023-07-11

## 2023-06-12 RX ORDER — CALCIUM ACETATE 667 MG
2 TABLET ORAL
Qty: 90 | Refills: 0
Start: 2023-06-12 | End: 2023-07-11

## 2023-06-12 RX ORDER — IRON SUCROSE 20 MG/ML
500 INJECTION, SOLUTION INTRAVENOUS ONCE
Refills: 0 | Status: COMPLETED | OUTPATIENT
Start: 2023-06-12 | End: 2023-06-12

## 2023-06-12 RX ORDER — SODIUM BICARBONATE 1 MEQ/ML
1 SYRINGE (ML) INTRAVENOUS
Qty: 0 | Refills: 0 | DISCHARGE
Start: 2023-06-12

## 2023-06-12 RX ADMIN — CARVEDILOL PHOSPHATE 12.5 MILLIGRAM(S): 80 CAPSULE, EXTENDED RELEASE ORAL at 14:47

## 2023-06-12 RX ADMIN — CALCITRIOL 1 MICROGRAM(S): 0.5 CAPSULE ORAL at 10:39

## 2023-06-12 RX ADMIN — Medication 650 MILLIGRAM(S): at 00:11

## 2023-06-12 RX ADMIN — LISINOPRIL 10 MILLIGRAM(S): 2.5 TABLET ORAL at 06:43

## 2023-06-12 RX ADMIN — BUDESONIDE AND FORMOTEROL FUMARATE DIHYDRATE 2 PUFF(S): 160; 4.5 AEROSOL RESPIRATORY (INHALATION) at 19:16

## 2023-06-12 RX ADMIN — CARVEDILOL PHOSPHATE 12.5 MILLIGRAM(S): 80 CAPSULE, EXTENDED RELEASE ORAL at 21:14

## 2023-06-12 RX ADMIN — AMLODIPINE BESYLATE 10 MILLIGRAM(S): 2.5 TABLET ORAL at 06:43

## 2023-06-12 RX ADMIN — Medication 1334 MILLIGRAM(S): at 10:39

## 2023-06-12 RX ADMIN — Medication 3 MILLIGRAM(S): at 23:57

## 2023-06-12 RX ADMIN — BUDESONIDE AND FORMOTEROL FUMARATE DIHYDRATE 2 PUFF(S): 160; 4.5 AEROSOL RESPIRATORY (INHALATION) at 06:43

## 2023-06-12 RX ADMIN — Medication 1 TABLET(S): at 06:43

## 2023-06-12 RX ADMIN — IRON SUCROSE 68.75 MILLIGRAM(S): 20 INJECTION, SOLUTION INTRAVENOUS at 14:48

## 2023-06-12 RX ADMIN — Medication 650 MILLIGRAM(S): at 06:43

## 2023-06-12 RX ADMIN — ERYTHROPOIETIN 10000 UNIT(S): 10000 INJECTION, SOLUTION INTRAVENOUS; SUBCUTANEOUS at 13:06

## 2023-06-12 RX ADMIN — Medication 650 MILLIGRAM(S): at 16:45

## 2023-06-12 RX ADMIN — Medication 650 MILLIGRAM(S): at 14:44

## 2023-06-12 RX ADMIN — Medication 1334 MILLIGRAM(S): at 16:45

## 2023-06-12 NOTE — PROGRESS NOTE ADULT - SUBJECTIVE AND OBJECTIVE BOX
Patient is a 37y Female seen and evaluated at bedside. no acute events overnight patient sates that she is feeling better appetite improving had HD #1 on 10 states hat she felt a bit nauseous during the session though is feeling better now /83 Hgb 7.6 Ca improving to 7.6 K 4.2        Meds:    acetaminophen     Tablet .. 650 every 6 hours PRN  amLODIPine   Tablet 10 daily  budesonide 160 MICROgram(s)/formoterol 4.5 MICROgram(s) Inhaler 2 two times a day  calcitriol   Capsule 1 every 12 hours  calcium acetate 1334 three times a day with meals  calcium carbonate   1250 mG (OsCal) 1 two times a day  carvedilol 12.5 every 12 hours  epoetin liz-epbx (RETACRIT) Injectable 79134 once  levalbuterol Inhalation 0.31 every 6 hours PRN  lisinopril 10 daily  melatonin 3 at bedtime PRN  ondansetron Injectable 4 every 8 hours PRN  sodium bicarbonate 650 three times a day  trimethobenzamide 300 every 8 hours      T(C): , Max: 37.6 (23 @ 01:07)  T(F): , Max: 99.7 (23 @ 01:07)  HR: 78 (23 @ 06:19)  BP: 121/83 (23 @ 06:19)  BP(mean): 99 (23 @ 06:19)  RR: 17 (23 @ 06:19)  SpO2: 100% (23 @ 16:45)  Wt(kg): --        Review of Systems:  all other ROS negative       PHYSICAL EXAM:  GENERAL: well-developed, well nourished, alert, no acute distress at present  CHEST/LUNG: Clear to auscultation bilaterally  HEART: normal S1S2, RRR  ABDOMEN: Soft, Nontender, +BS,   EXTREMITIES: No clubbing, cyanosis, or edema   SKIN" no lesions or rashes   ACCESS: R TDC       LABS:                        7.6    5.56  )-----------( 102      ( 2023 05:30 )             23.5     06-12    143  |  106  |  65<H>  ----------------------------<  86  4.2   |  25  |  13.45<H>    Ca    7.6<L>      2023 05:30  Phos  5.6       Mg     1.8         TPro  5.2<L>  /  Alb  3.1<L>  /  TBili  0.2  /  DBili  x   /  AST  7<L>  /  ALT  8<L>  /  AlkPhos  46                  RADIOLOGY & ADDITIONAL STUDIES:        Hemoglobin: 7.6 g/dL (23 @ 05:30)  Phosphorus Level, Serum: 5.6 mg/dL (23 @ 05:30)  Hemoglobin: 7.8 g/dL (23 @ 05:30)  Phosphorus Level, Serum: 5.5 mg/dL (23 @ 05:30)    Albumin, Serum: 3.1 g/dL (23 @ 05:30)  Albumin, Serum: 3.0 g/dL (23 @ 05:30)    calcium acetate 1334 milliGRAM(s) Oral three times a day with meals, 23 @ 20:22, Routine  epoetin liz-epbx (RETACRIT) Injectable 55792 Unit(s) IV Push once, 23 @ 05:56, Routine, Stop order after: 1 Doses    Hemodialysis Treatment.:     Schedule: Once, Modality: Hemodialysis, Access: Internal Jugular Central Venous Catheter    Dialyzer: Optiflux [id number], Time: 150 Min    Blood Flow: 250 mL/Min , Dialysate Flow: 500 mL/Min, Dialysate Temp: 36.5, Tubinmm (Adult)    Target Fluid Removal: 1 Liters    Dialysate Electrolytes (mEq/L): Potassium 3, Calcium 3, Sodium 138, Bicarbonate 35 (23 @ 05:55) [Active]

## 2023-06-12 NOTE — PROGRESS NOTE ADULT - PROBLEM SELECTOR PLAN 4
Elevated BNP to 42K. Patient with renal failure which may elevate BNP, however will r/o heart failure.  - f/u TTE Elevated BNP to 42K. Patient with renal failure which may elevate BNP, however will r/o heart failure.  - f/u TTE.

## 2023-06-12 NOTE — DISCHARGE NOTE PROVIDER - PROVIDER TOKENS
PROVIDER:[TOKEN:[[id number]],FOLLOWUP:[2 weeks],ESTABLISHEDPATIENT:[T]],PROVIDER:[TOKEN:[[id number]],FOLLOWUP:[2 weeks]] PROVIDER:[TOKEN:[[id number]],FOLLOWUP:[2 weeks],ESTABLISHEDPATIENT:[T]],PROVIDER:[TOKEN:[[id number]],FOLLOWUP:[2 weeks]],PROVIDER:[TOKEN:[[id number]],SCHEDULEDAPPT:[06/19/2023],SCHEDULEDAPPTTIME:[11:45 AM]]

## 2023-06-12 NOTE — DISCHARGE NOTE PROVIDER - HOSPITAL COURSE
#Discharge: do not delete    37F PMH of HTN, asthma, CKD Stage 4 not on HD (baseline Cr 2.92), PSH lap paloma, , bariatric sleeve gastrectomy () presents from outpatient PCP for worsening renal function, admitted for HD initiation, on telemetry for hypocalcemia w/ EKG changes.       Problem/Plan - 1:  ·  Problem: Hypocalcemia.   ·  Plan: RESOLVING. Patient found to have hypocalcemia to 5.3 on admission with EKG changes (prolonged QTc), admitted to telemetry. Likely 2/2 worsening renal failure. S/p 1g Ca x2. QTc improving as Ca2+ does.  On 6/10 AM: sCa 6.7, gave 2g Ca, improved to 7.1. PTH: 792(H), VitD-25: 28.8(L), VitD-1,25: 21.8.  - D/w Nephro, will use higher calcium bath for HD iso hypoCa2+  -  QTc 459  - phoslo TID  - monitor EKGs.     Problem/Plan - 2:  ·  Problem: ESRD needing dialysis.   ·  Plan: #New HD initiation  Patient presenting from outpatient PCP provider with elevated Cr and BUN for dialysis initiation. Nephrology consulted. On admission Cr elevated to 16.98 w/ BUN 94. Sxs of fatigue. 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 Neves. Home mediations: bicarb 650 TID, calcitriol 0.25 daily, vit D2 50K units weekly  s/p R IJ Permacath , s/p 1st HD session 6/10 - tolerated well.    - f/u nephro recs  - vascular consult for vein mapping  - f/u quant   - c/w home bicarb and calcitriol  - hold vit D2 50K inpatient, restart as outpatient  - renal diet  - social work consult for HD unit.    #Symptomatic anemia.   Patient with Hb of 7.9 on admission. Symptomatic with worsening fatigue and PIMENTEL for 4 months. No known history of anemia. Patient currently menstruating and endorses abnormal/increased bleeding since 2022.  Otherwise no other active sources of bleeding. Never had colonoscopy.  - transfuse 1 unit pRBCs  - f/u post transfusion CBC  - f/u collateral for baseline Hb  - Fe studies  - monitor H+H  - maintain active T+S  - PT consulted.    #Elevated brain natriuretic peptide (BNP) level.   Elevated BNP to 42K. Patient with renal failure which may elevate BNP, however will r/o heart failure.  - f/u TTE.    #Hypertension.   Home medications: lisinopril 10mg daily, coreg 12.5 BID, amlodipine 10mg daily. Per patient has not been taking lisinopril because ran out and was having difficulties with insurance.  - c/w home meds.    #Asthma.   Home medications: symbicort 160/4.5 2 puffs BID, levalbuterol tartrate HFA 45mcg PRN  - c/w home meds.    #Diarrhea.   Patient with 1 month history of diarrhea, associated with eating. H/o bariatric surgery. No significant changes today. No infectious symptoms.  - monitor BMs  - nutrition consult.  New medications:   Labs to be followed outpatient:   Exam to be followed outpatient:    #Discharge: do not delete    37F PMH of HTN, asthma, CKD Stage 4 not on HD (baseline Cr 2.92), PSH lap paloma, , bariatric sleeve gastrectomy () presents from outpatient PCP for worsening renal function, admitted for HD initiation, on telemetry for hypocalcemia w/ EKG changes.    #Hypocalcemia.   RESOLVING. Patient found to have hypocalcemia to 5.3 on admission with EKG changes (prolonged QTc), admitted to telemetry. Likely 2/2 worsening renal failure. S/p 1g Ca x2. QTc improving as Ca2+ does.  On 6/10 AM: sCa 6.7, gave 2g Ca, improved to 7.1. PTH: 792(H), VitD-25: 28.8(L), VitD-1,25: 21.8. Calcium now 8.2-8.3, corrected for hypoalbuminemia.   - D/w Nephro, will use higher calcium bath for HD iso hypoCa2+  -  QTc 459  - phoslo TID    #ESRD needing dialysis.   #New HD initiation  Patient presenting from outpatient PCP provider with elevated Cr and BUN for dialysis initiation. Nephrology consulted. On admission Cr elevated to 16.98 w/ BUN 94. Sxs of fatigue. 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 Neves. Home mediations: bicarb 650 TID, calcitriol 0.25 daily, vit D2 50K units weekly. S/p R IJ Permacath , s/p 1st HD session 6/10 - tolerated well. US obtained for vein mapping for possible AV fistula placement outpatient, however patient would like to discuss peritoneal dialysis vs kidney transplant. Pt will follow up for HD sessions in the mean time.  - c/w home bicarb and calcitriol  - hold vit D2 50K inpatient, restart as outpatient  - F/u with nephrology Dr. Neves    #Symptomatic anemia.   Patient with Hb of 7.9 on admission. Symptomatic with worsening fatigue and PIMENTEL for 4 months. No known history of anemia. Patient currently menstruating and endorses abnormal/increased bleeding since 2022.  Otherwise no other active sources of bleeding. Never had colonoscopy. Iron studies showing low iron. Likely combination of AoCD from ESRD and TOÑA. S/p 1 unit pRBC.  - Given iron sucrose IV 500mg once  - Started ferrous sulfate 325mg once daily    #Elevated brain natriuretic peptide (BNP) level.   Elevated BNP to 42K. Patient with renal failure which may elevate BNP, however will r/o heart failure.  - f/u TTE.    #Hypertension.   Home medications: lisinopril 10mg daily, coreg 12.5 BID, amlodipine 10mg daily. Per patient has not been taking lisinopril because ran out and was having difficulties with insurance.  - c/w home meds.    #Asthma.   Home medications: symbicort 160/4.5 2 puffs BID, levalbuterol tartrate HFA 45mcg PRN  - c/w home meds.    #Diarrhea.   Patient with 1 month history of diarrhea, associated with eating. H/o bariatric surgery. No significant changes today. No infectious symptoms.  - monitor BMs  - nutrition consult    New medications: Phoslo TID, ferrous sulfate 325mg qd  Labs to be followed outpatient: CBC (Hg), BMP  Discharged to home    Exam to be followed outpatient:   Constitutional: young female resting comfortably; NAD  HEENT: NC/AT, anicteric sclera, MMM  Neck: supple  Respiratory: CTA B/L  Cardiac: +S1/S2; RRR  Gastrointestinal: soft, NT/ND  Extremities: WWP, no clubbing or cyanosis; 1+ b/l LE edema  Vascular: 2+ radial, DP/PT pulses B/L. R IJ Permacath in place, dressings c/d/i.   Dermatologic: skin warm, dry and intact  Neurologic: AAOx3, no focal deficits  Psychiatric: calm, cooperative, behaviors are appropriate #Discharge: do not delete    37F PMH of HTN, asthma, CKD Stage 4 not on HD (baseline Cr 2.92), PSH lap paloma, , bariatric sleeve gastrectomy (2019) presents from outpatient PCP for worsening renal function, admitted for HD initiation, on telemetry for hypocalcemia w/ EKG changes.    #ESRD needing dialysis.   #New HD initiation  Patient presenting from outpatient PCP provider with elevated Cr and BUN for dialysis initiation. Nephrology consulted. On admission Cr elevated to 16.98 w/ BUN 94. Sxs of fatigue. 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 Neves. Home mediations: bicarb 650 TID, calcitriol 0.25 daily, vit D2 50K units weekly. S/p R IJ Permacath , s/p 1st HD session 6/10 - tolerated well. US obtained for vein mapping for possible AV fistula placement outpatient, however patient would like to discuss peritoneal dialysis vs kidney transplant. Pt will follow up for HD sessions in the mean time.  - c/w home bicarb and calcitriol  - hold vit D2 50K inpatient, restart as outpatient  - F/u with nephrology Dr. Neves    #HFrEF  #Severe mitral regurgitation  Echo showing EF 43% and severe MR. Could be cause of fluid overload.   - F/u structural heart team    #Thrombocytopenia  Platelets greater than 50% decreased since admission, now at 71. No signs of bleeding. Concerning for HIT however patient did not receive any heparin products this admission, including heparin flushes for HD cath during dialysis. Anti-PF4 negative. Suspect this is from platelet consumption is from dialysate.  - Platelets improving after changing dialysate    #Hypocalcemia.   RESOLVING. Patient found to have hypocalcemia to 5.3 on admission with EKG changes (prolonged QTc), admitted to telemetry. Likely 2/2 worsening renal failure. S/p 1g Ca x2. QTc improving as Ca2+ does.  On 6/10 AM: sCa 6.7, gave 2g Ca, improved to 7.1. PTH: 792(H), VitD-25: 28.8(L), VitD-1,25: 21.8. Calcium now 8.2-8.3, corrected for hypoalbuminemia.   - D/w Nephro, will use higher calcium bath for HD iso hypoCa2+  - 6/11 QTc 459  - phoslo TID    #Symptomatic anemia.   Patient with Hb of 7.9 on admission. Symptomatic with worsening fatigue and PIMENTEL for 4 months. No known history of anemia. Patient currently menstruating and endorses abnormal/increased bleeding since 2022.  Otherwise no other active sources of bleeding. Never had colonoscopy. Iron studies showing low iron. Likely combination of AoCD from ESRD and TOÑA. S/p 1 unit pRBC.  - Given iron sucrose IV 500mg once  - Started ferrous sulfate 325mg once daily    #Elevated brain natriuretic peptide (BNP) level.   Elevated BNP to 42K. Patient with renal failure which may elevate BNP, however will r/o heart failure.  - f/u TTE.    #Hypertension.   Home medications: lisinopril 10mg daily, coreg 12.5 BID, amlodipine 10mg daily. Per patient has not been taking lisinopril because ran out and was having difficulties with insurance.  - c/w home meds.    #Asthma.   Home medications: symbicort 160/4.5 2 puffs BID, levalbuterol tartrate HFA 45mcg PRN  - c/w home meds.    #Diarrhea.   Patient with 1 month history of diarrhea, associated with eating. H/o bariatric surgery. No significant changes today. No infectious symptoms.  - monitor BMs  - nutrition consult    New medications: Phoslo TID, ferrous sulfate 325mg qd  Labs to be followed outpatient: CBC (Hg), BMP  Discharged to home    Exam to be followed outpatient:   Constitutional: young female resting comfortably; NAD  HEENT: NC/AT, anicteric sclera, MMM  Neck: supple  Respiratory: CTA B/L  Cardiac: +S1/S2; RRR  Gastrointestinal: soft, NT/ND  Extremities: WWP, no clubbing or cyanosis; 1+ b/l LE edema  Vascular: 2+ radial, DP/PT pulses B/L. R IJ Permacath in place, dressings c/d/i.   Dermatologic: skin warm, dry and intact  Neurologic: AAOx3, no focal deficits  Psychiatric: calm, cooperative, behaviors are appropriate #Discharge: do not delete    37F PMH of HTN, asthma, CKD Stage 4 not on HD (baseline Cr 2.92), PSH lap paloma, , bariatric sleeve gastrectomy (2019) presents from outpatient PCP for worsening renal function, admitted for HD initiation, on telemetry for hypocalcemia w/ EKG changes.    #ESRD needing dialysis.   #New HD initiation  Patient presenting from outpatient PCP provider with elevated Cr and BUN for dialysis initiation. Nephrology consulted. On admission Cr elevated to 16.98 w/ BUN 94. Sxs of fatigue. 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 Neves. Home mediations: bicarb 650 TID, calcitriol 0.25 daily, vit D2 50K units weekly. S/p R IJ Permacath , s/p 1st HD session 6/10 - tolerated well. US obtained for vein mapping for possible AV fistula placement outpatient, however patient would like to discuss peritoneal dialysis vs kidney transplant. Pt will follow up for HD sessions in the mean time. S/p HD x 2, well tolerated.   - c/w home bicarb and calcitriol  - Avoid Optiflux dialyzer membrane during future HD  - hold vit D2 50K inpatient, restart as outpatient  - F/u with nephrology Dr. Neves    #HFrEF  #Severe mitral regurgitation  Echo showing EF 43% and severe MR. Could be cause of fluid overload.   - F/u structural heart team    #Thrombocytopenia  Platelets greater than 50% decreased since admission, now at 71. No signs of bleeding. Concerning for HIT however patient did not receive any heparin products this admission, including heparin flushes for HD cath during dialysis. Anti-PF4 negative. Suspect this is from platelet consumption is from dialysate. Thrombocytopenia likely due to dialyzer-reaction from Optiflux dialyzer membrane or perhaps sterilization technique (ex. ebeam).   - Platelets improving after changing dialysate  - Avoid Optiflux dialyzer membrane during future HD    #Hypocalcemia.   RESOLVING. Patient found to have hypocalcemia to 5.3 on admission with EKG changes (prolonged QTc), admitted to telemetry. Likely 2/2 worsening renal failure. S/p 1g Ca x2. QTc improving as Ca2+ does.  On 6/10 AM: sCa 6.7, gave 2g Ca, improved to 7.1. PTH: 792(H), VitD-25: 28.8(L), VitD-1,25: 21.8. Calcium now 8.2-8.3, corrected for hypoalbuminemia.   - D/w Nephro, will use higher calcium bath for HD iso hypoCa2+  -  QTc 459  - phoslo TID    #Symptomatic anemia.   Patient with Hb of 7.9 on admission. Symptomatic with worsening fatigue and PIMENTEL for 4 months. No known history of anemia. Patient currently menstruating and endorses abnormal/increased bleeding since 2022.  Otherwise no other active sources of bleeding. Never had colonoscopy. Iron studies showing low iron. Likely combination of AoCD from ESRD and TOÑA. S/p 1 unit pRBC.  - Given iron sucrose IV 500mg once  - Started ferrous sulfate 325mg once daily    #Elevated brain natriuretic peptide (BNP) level.   Elevated BNP to 42K. Patient with renal failure which may elevate BNP, however will r/o heart failure.  - f/u TTE.    #Hypertension.   Home medications: lisinopril 10mg daily, coreg 12.5 BID, amlodipine 10mg daily. Per patient has not been taking lisinopril because ran out and was having difficulties with insurance.  - c/w home meds.    #Asthma.   Home medications: symbicort 160/4.5 2 puffs BID, levalbuterol tartrate HFA 45mcg PRN  - c/w home meds.    #Diarrhea.   Patient with 1 month history of diarrhea, associated with eating. H/o bariatric surgery. No significant changes today. No infectious symptoms.  - monitor BMs  - nutrition consult    New medications: Phoslo TID, ferrous sulfate 325mg qd  Labs to be followed outpatient: CBC (Hg), BMP  Discharged to home    Exam to be followed outpatient:   Constitutional: young female resting comfortably; NAD  HEENT: NC/AT, anicteric sclera, MMM  Neck: supple  Respiratory: CTA B/L  Cardiac: +S1/S2; RRR  Gastrointestinal: soft, NT/ND  Extremities: WWP, no clubbing or cyanosis; 1+ b/l LE edema  Vascular: 2+ radial, DP/PT pulses B/L. R IJ Permacath in place, dressings c/d/i.   Dermatologic: skin warm, dry and intact  Neurologic: AAOx3, no focal deficits  Psychiatric: calm, cooperative, behaviors are appropriate #Discharge: do not delete    37F PMH of HTN, asthma, CKD Stage 4 not on HD (baseline Cr 2.92), PSH lap paloma, , bariatric sleeve gastrectomy (2019) presents from outpatient PCP for worsening renal function, admitted for HD initiation, on telemetry for hypocalcemia w/ EKG changes.    #ESRD needing dialysis.   #New HD initiation  Patient presenting from outpatient PCP provider with elevated Cr and BUN for dialysis initiation. Nephrology consulted. On admission Cr elevated to 16.98 w/ BUN 94. Sxs of fatigue. 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 Neves. Home mediations: bicarb 650 TID, calcitriol 0.25 daily, vit D2 50K units weekly. S/p R IJ Permacath , s/p 1st HD session 6/10 - tolerated well. US obtained for vein mapping for possible AV fistula placement outpatient, however patient would like to discuss peritoneal dialysis vs kidney transplant. Pt will follow up for HD sessions in the mean time. S/p HD x 2, well tolerated.   - c/w home bicarb and calcitriol  - Avoid Optiflux dialyzer membrane during future HD  - hold vit D2 50K inpatient, restart as outpatient  - F/u with nephrology Dr. Neves    #HFrEF  #Severe mitral regurgitation  Echo showing EF 43% and severe MR. Could be cause of fluid overload.   - F/u structural heart Dr. Nieto outpatient     #Thrombocytopenia  Platelets greater than 50% decreased since admission, now at 71. No signs of bleeding. Concerning for HIT however patient did not receive any heparin products this admission, including heparin flushes for HD cath during dialysis. Anti-PF4 negative. Suspect this is from platelet consumption is from dialysate. Thrombocytopenia likely due to dialyzer-reaction from Optiflux dialyzer membrane or perhaps sterilization technique (ex. ebeam).   - Platelets improving after changing dialysate  - Avoid Optiflux dialyzer membrane during future HD    #Hypocalcemia.   RESOLVING. Patient found to have hypocalcemia to 5.3 on admission with EKG changes (prolonged QTc), admitted to telemetry. Likely 2/2 worsening renal failure. S/p 1g Ca x2. QTc improving as Ca2+ does.  On 610 AM: sCa 6.7, gave 2g Ca, improved to 7.1. PTH: 792(H), VitD-25: 28.8(L), VitD-1,25: 21.8. Calcium now 8.2-8.3, corrected for hypoalbuminemia.   - D/w Nephro, will use higher calcium bath for HD iso hypoCa2+  -  QTc 459  - phoslo TID    #Symptomatic anemia.   Patient with Hb of 7.9 on admission. Symptomatic with worsening fatigue and PIMENTEL for 4 months. No known history of anemia. Patient currently menstruating and endorses abnormal/increased bleeding since 2022.  Otherwise no other active sources of bleeding. Never had colonoscopy. Iron studies showing low iron. Likely combination of AoCD from ESRD and TOÑA. S/p 1 unit pRBC.  - Given iron sucrose IV 500mg once  - Started ferrous sulfate 325mg once daily    #Elevated brain natriuretic peptide (BNP) level.   Elevated BNP to 42K. Patient with renal failure which may elevate BNP, however will r/o heart failure.  - f/u TTE.    #Hypertension.   Home medications: lisinopril 10mg daily, coreg 12.5 BID, amlodipine 10mg daily. Per patient has not been taking lisinopril because ran out and was having difficulties with insurance.  - c/w home meds.    #Asthma.   Home medications: symbicort 160/4.5 2 puffs BID, levalbuterol tartrate HFA 45mcg PRN  - c/w home meds.    #Diarrhea.   Patient with 1 month history of diarrhea, associated with eating. H/o bariatric surgery. No significant changes today. No infectious symptoms.  - monitor BMs  - nutrition consult    New medications: Phoslo TID, ferrous sulfate 325mg qd  Labs to be followed outpatient: CBC (Hg), BMP  Discharged to home    Exam to be followed outpatient:   Constitutional: young female resting comfortably; NAD  HEENT: NC/AT, anicteric sclera, MMM  Neck: supple  Respiratory: CTA B/L  Cardiac: +S1/S2; RRR  Gastrointestinal: soft, NT/ND  Extremities: WWP, no clubbing or cyanosis; 1+ b/l LE edema  Vascular: 2+ radial, DP/PT pulses B/L. R IJ Permacath in place, dressings c/d/i.   Dermatologic: skin warm, dry and intact  Neurologic: AAOx3, no focal deficits  Psychiatric: calm, cooperative, behaviors are appropriate

## 2023-06-12 NOTE — DISCHARGE NOTE PROVIDER - CARE PROVIDER_API CALL
Timothy Neves  Nephrology  [address]  [place] [address]  Phone: [phone]  Fax: [phone]  Established Patient  Follow Up Time: 2 weeks    ALANA TIWARI  Phone: [phone]  Fax: ()-  Follow Up Time: 2 weeks   Timothy Neves  Nephrology  [address]  [place] [address]  Phone: [phone]  Fax: [phone]  Established Patient  Follow Up Time: 2 weeks    ALANA TIWARI  Phone: [phone]  Fax: ()-  Follow Up Time: 2 weeks    Roberto Nieto  Interventional Cardiology  [address] [place] [address]  Phone: [phone]  Fax: [phone]  Scheduled Appointment: 06/19/2023 11:45 AM

## 2023-06-12 NOTE — PROGRESS NOTE ADULT - PROBLEM SELECTOR PLAN 2
#New HD initiation  Patient presenting from outpatient PCP provider with elevated Cr and BUN for dialysis initiation. Nephrology consulted. On admission Cr elevated to 16.98 w/ BUN 94. Sxs of fatigue. 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 Neves. Home mediations: bicarb 650 TID, calcitriol 0.25 daily, vit D2 50K units weekly  s/p R IJ Permacath 6/9, s/p 1st HD session 6/10 - tolerated well.    - f/u nephro recs  - vascular consult for vein mapping  - f/u quant   - c/w home bicarb and calcitriol  - hold vit D2 50K inpatient, restart as outpatient  - renal diet  - social work consult for HD unit #New HD initiation  Patient presenting from outpatient PCP provider with elevated Cr and BUN for dialysis initiation. Nephrology consulted. On admission Cr elevated to 16.98 w/ BUN 94. Sxs of fatigue. 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 Neves. Home mediations: bicarb 650 TID, calcitriol 0.25 daily, vit D2 50K units weekly. S/p R IJ Permacath 6/9, s/p 1st HD session 6/10 - tolerated well. US obtained for vein mapping for possible AV fistula placement outpatient, however patient would like to discuss peritoneal dialysis vs kidney transplant. Pt will follow up for HD sessions in the mean time.  - c/w home bicarb and calcitriol  - hold vit D2 50K inpatient, restart as outpatient  - F/u with nephrology Dr. Neves

## 2023-06-12 NOTE — PROGRESS NOTE ADULT - PROBLEM SELECTOR PLAN 1
RESOLVING. Patient found to have hypocalcemia to 5.3 on admission with EKG changes (prolonged QTc), admitted to telemetry. Likely 2/2 worsening renal failure. S/p 1g Ca x2. QTc improving as Ca2+ does.  On 6/10 AM: sCa 6.7, gave 2g Ca, improved to 7.1. PTH: 792(H), VitD-25: 28.8(L), VitD-1,25: 21.8.  - D/w Nephro, will use higher calcium bath for HD iso hypoCa2+  - 6/11 QTc 459  - phoslo TID  - monitor EKGs RESOLVING. Patient found to have hypocalcemia to 5.3 on admission with EKG changes (prolonged QTc), admitted to telemetry. Likely 2/2 worsening renal failure. S/p 1g Ca x2. QTc improving as Ca2+ does.  On 6/10 AM: sCa 6.7, gave 2g Ca, improved to 7.1. PTH: 792(H), VitD-25: 28.8(L), VitD-1,25: 21.8. Calcium now 8.2-8.3, corrected for hypoalbuminemia.   - D/w Nephro, will use higher calcium bath for HD iso hypoCa2+  - 6/11 QTc 459  - phoslo TID

## 2023-06-12 NOTE — PROGRESS NOTE ADULT - ATTENDING COMMENTS
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 Given epo and IV Fe for anemia today on H D.   Hypocalcemia 2/2 secondary hyperparathyroidism resolved, corrected Ca 8.3. Can stop PO calcitriol and CaCarbonate. Will give IV hecterol on HD tmrw. Hyperphosphatemia improving on Ph binder.     d/w pt option of HD vs PD, and need to asap transplant eval, mother with HTN would like to be considered a donor.   Does NOT need permanent dialysis access at this time as she may be able to get a kidney from her mother within the next few months.   TB quant negative, Hep B/C negative. Pending placement in our outpt HD unit on the [facility]

## 2023-06-12 NOTE — DISCHARGE NOTE PROVIDER - NSDCCPCAREPLAN_GEN_ALL_CORE_FT
PRINCIPAL DISCHARGE DIAGNOSIS  Diagnosis: Renal failure, acute on chronic  Assessment and Plan of Treatment: Acute kidney injury is when the kidney function worsens. Normally, the kidneys filter the blood and remove waste and excess salt and water. The word "acute" means sudden. Another term for acute kidney injury is "acute kidney failure." Acute kidney injury can have different causes. It can happen when there is less blood flow than usual to the kidneys, when the kidneys get damaged (from infections, cancer, certain medications, or autoimmune conditions), or when the path for urine to leave the body is blocked (a common example is prostate enlargement in men).   In your case, the kidney injury is chronic. Chronic kidney disease is the gradual and permanent loss of kidney function. Normally, the kidneys remove fluids, chemicals, and waste from your blood and turn these waste chemicals into urine. Unfortunately, your kidney injury requires hemodialysis. You should follow up with your nephrologist Dr. Neves in 2 weeks. We have arranged for you to continue receiving dialysis outpatient in the mean time. Call 911 or seek care immediately if your heart is beating faster than normal for you, are confused or very drowsy, have a seizure, have sudden chest pain or shortness of breath. Please also continue to follow regularly with your PCP.  INSTRUCTIONS:  - Continue hemodialysis sessions Monday, Wednesday, Friday.   - Take Calcitriol 0.25mcg 1 capsule once a day  - Take Calcium carbonate 1250mg 1 tablet two times per day  - Take Calcium acetate 667mg 2 tablets three times per day with meals  - Take Sodium bicarbonate 650mg 1 tablet three times per day with meals  - Follow up with Dr. Neves in 1-2 weeks to discuss other options in regards treatment for your kidney disease.      SECONDARY DISCHARGE DIAGNOSES  Diagnosis: Hypocalcemia  Assessment and Plan of Treatment: Hypocalcemia is a condition in which there are lower-than-average levels of calcium in the liquid part of the blood, or the plasma. Calcium has many important roles in your body. Calcium is key to the conduction of electricity in your body. Your nervous system needs calcium to function properly. Your nerves need calcium to relay messages between your brain and the rest of your body. Your muscles need calcium to move. Your bones need calcium to stay strong, grow, and heal. Hypocalcemia may be the result of low calcium production or insufficient calcium circulation in your body. A deficiency of magnesium or vitamin D is linked to most cases of hypocalcemia. We monitored your heart while in the hospital and your EKG showed that the heart function improved after calcium supplements were started.   INSTRUCTIONS:  - Please continue taking calcium supplements as prescribed.  - Please resume your weekly Vitamin D supplements.   - Follow up with your primary care doctor to continue monitoring.     PRINCIPAL DISCHARGE DIAGNOSIS  Diagnosis: Renal failure, acute on chronic  Assessment and Plan of Treatment: Acute kidney injury is when the kidney function worsens. Normally, the kidneys filter the blood and remove waste and excess salt and water. The word "acute" means sudden. Another term for acute kidney injury is "acute kidney failure." Acute kidney injury can have different causes. It can happen when there is less blood flow than usual to the kidneys, when the kidneys get damaged (from infections, cancer, certain medications, or autoimmune conditions), or when the path for urine to leave the body is blocked (a common example is prostate enlargement in men).   In your case, the kidney injury is chronic. Chronic kidney disease is the gradual and permanent loss of kidney function. Normally, the kidneys remove fluids, chemicals, and waste from your blood and turn these waste chemicals into urine. Unfortunately, your kidney injury requires hemodialysis. You should follow up with your nephrologist Dr. Neves in 2 weeks. We have arranged for you to continue receiving dialysis outpatient in the mean time. Call 911 or seek care immediately if your heart is beating faster than normal for you, are confused or very drowsy, have a seizure, have sudden chest pain or shortness of breath. Please also continue to follow regularly with your PCP.  INSTRUCTIONS:  - Continue hemodialysis sessions Monday, Wednesday, Friday.   - Take Calcitriol 0.25mcg 1 capsule once a day  - Take Calcium carbonate 1250mg 1 tablet two times per day  - Take Calcium acetate 667mg 2 tablets three times per day with meals  - Take Sodium bicarbonate 650mg 1 tablet three times per day with meals  - Follow up with Dr. Neves in 1-2 weeks to discuss other options in regards treatment for your kidney disease.      SECONDARY DISCHARGE DIAGNOSES  Diagnosis: Hypocalcemia  Assessment and Plan of Treatment: Hypocalcemia is a condition in which there are lower-than-average levels of calcium in the liquid part of the blood, or the plasma. Calcium has many important roles in your body. Calcium is key to the conduction of electricity in your body. Your nervous system needs calcium to function properly. Your nerves need calcium to relay messages between your brain and the rest of your body. Your muscles need calcium to move. Your bones need calcium to stay strong, grow, and heal. Hypocalcemia may be the result of low calcium production or insufficient calcium circulation in your body. A deficiency of magnesium or vitamin D is linked to most cases of hypocalcemia. We monitored your heart while in the hospital and your EKG showed that the heart function improved after calcium supplements were started.   INSTRUCTIONS:  - Please continue taking calcium supplements as prescribed.  - Please resume your weekly Vitamin D supplements.   - Follow up with your primary care doctor to continue monitoring.    Diagnosis: Anemia  Assessment and Plan of Treatment: Anemia is the medical term for when a person has too few red blood cells. Red blood cells are the cells in your blood that carry oxygen. If you have too few red blood cells, your body does not get all the oxygen it needs. Most people with anemia have no symptoms. They find out they have it after their doctor does blood tests for another reason. People who do have symptoms might feel tired or weak, especially if they try to exercise or have headaches. If you experience these symptoms you should see your primary care provider for further evaluation. We believe your anemia was due to a combination of chronic kidney disease and low iron.   - Start taking ferrous sulfate 325mg once a day.       PRINCIPAL DISCHARGE DIAGNOSIS  Diagnosis: Renal failure, acute on chronic  Assessment and Plan of Treatment: Acute kidney injury is when the kidney function worsens. Normally, the kidneys filter the blood and remove waste and excess salt and water. The word "acute" means sudden. Another term for acute kidney injury is "acute kidney failure." Acute kidney injury can have different causes. It can happen when there is less blood flow than usual to the kidneys, when the kidneys get damaged (from infections, cancer, certain medications, or autoimmune conditions), or when the path for urine to leave the body is blocked (a common example is prostate enlargement in men).   In your case, the kidney injury is chronic. Chronic kidney disease is the gradual and permanent loss of kidney function. Normally, the kidneys remove fluids, chemicals, and waste from your blood and turn these waste chemicals into urine. Unfortunately, your kidney injury requires hemodialysis. You should follow up with your nephrologist Dr. Neves in 2 weeks. We have arranged for you to continue receiving dialysis outpatient in the mean time. Call 911 or seek care immediately if your heart is beating faster than normal for you, are confused or very drowsy, have a seizure, have sudden chest pain or shortness of breath. Please also continue to follow regularly with your PCP.  INSTRUCTIONS:  - Continue hemodialysis sessions Monday, Wednesday, Friday.   - Communicate with HD facility: Avoid Optiflux dialyzer   - Take Calcitriol 0.25mcg 1 capsule once a day  - Take Calcium carbonate 1250mg 1 tablet two times per day  - Take Calcium acetate 667mg 2 tablets three times per day with meals  - Take Sodium bicarbonate 650mg 1 tablet three times per day with meals  - Follow up with Dr. Neves in 1-2 weeks to discuss other options in regards treatment for your kidney disease.      SECONDARY DISCHARGE DIAGNOSES  Diagnosis: Hypocalcemia  Assessment and Plan of Treatment: Hypocalcemia is a condition in which there are lower-than-average levels of calcium in the liquid part of the blood, or the plasma. Calcium has many important roles in your body. Calcium is key to the conduction of electricity in your body. Your nervous system needs calcium to function properly. Your nerves need calcium to relay messages between your brain and the rest of your body. Your muscles need calcium to move. Your bones need calcium to stay strong, grow, and heal. Hypocalcemia may be the result of low calcium production or insufficient calcium circulation in your body. A deficiency of magnesium or vitamin D is linked to most cases of hypocalcemia. We monitored your heart while in the hospital and your EKG showed that the heart function improved after calcium supplements were started.   INSTRUCTIONS:  - Please continue taking calcium supplements as prescribed.  - Please resume your weekly Vitamin D supplements.   - Follow up with your primary care doctor to continue monitoring.    Diagnosis: Anemia  Assessment and Plan of Treatment: Anemia is the medical term for when a person has too few red blood cells. Red blood cells are the cells in your blood that carry oxygen. If you have too few red blood cells, your body does not get all the oxygen it needs. Most people with anemia have no symptoms. They find out they have it after their doctor does blood tests for another reason. People who do have symptoms might feel tired or weak, especially if they try to exercise or have headaches. If you experience these symptoms you should see your primary care provider for further evaluation. We believe your anemia was due to a combination of chronic kidney disease and low iron.   - Start taking ferrous sulfate 325mg once a day.  - If you develop constipation, you may take it every other day.

## 2023-06-12 NOTE — PROGRESS NOTE ADULT - ATTENDING COMMENTS
Patient seen and examined with house-staff during bedside rounds.  Resident note read, including vitals, physical findings, laboratory data, and radiological reports.   Revisions included below.  Direct personal management at bed side and extensive interpretation of the data.  Plan was outlined and discussed in details with the housestaff.  Decision making of high complexity  Action taken for acute disease activity to reflect the level of care provided:  - medication reconciliation  - review laboratory data Patient seen and examined with house-staff during bedside rounds.  Resident note read, including vitals, physical findings, laboratory data, and radiological reports.   Revisions included below.  Direct personal management at bed side and extensive interpretation of the data.  Plan was outlined and discussed in details with the housestaff.  Decision making of high complexity  Action taken for acute disease activity to reflect the level of care provided:  - medication reconciliation  - review laboratory data  Calcium level is stable.  Plan for hemodialysis.  Blood pressures controlled.  We will follow-up with nephrology.  Possible discharge.  Calcium level is adequate

## 2023-06-12 NOTE — PROGRESS NOTE ADULT - PROBLEM SELECTOR PLAN 3
Patient with Hb of 7.9 on admission. Symptomatic with worsening fatigue and PIMENTEL for 4 months. No known history of anemia. Patient currently menstruating and endorses abnormal/increased bleeding since Sept 2022.  Otherwise no other active sources of bleeding. Never had colonoscopy.  - transfuse 1 unit pRBCs  - f/u post transfusion CBC  - f/u collateral for baseline Hb  - Fe studies  - monitor H+H  - maintain active T+S  - PT consulted Patient with Hb of 7.9 on admission. Symptomatic with worsening fatigue and PIMENTEL for 4 months. No known history of anemia. Patient currently menstruating and endorses abnormal/increased bleeding since Sept 2022.  Otherwise no other active sources of bleeding. Never had colonoscopy. Iron studies showing low iron. Likely combination of AoCD from ESRD and TOÑA. S/p 1 unit pRBC.  - Given iron sucrose IV 500mg once  - Started ferrous sulfate 325mg once daily

## 2023-06-12 NOTE — DISCHARGE NOTE PROVIDER - NSDCMRMEDTOKEN_GEN_ALL_CORE_FT
bicarbonate 650 TID:   calcitriol 0.25 mcg oral capsule: 1 cap(s) orally once a day  carvedilol 12.5 mg oral tablet: 1 tab(s) orally 2 times a day  levalbuterol 45 mcg/inh inhalation aerosol: 2 puff(s) inhaled every 6 hours as needed for  shortness of breath and/or wheezing  lisinopril 10 mg oral tablet: 1 tab(s) orally once a day  Norvasc 10 mg oral tablet: 1 tab(s) orally once a day  Symbicort 160 mcg-4.5 mcg/inh inhalation aerosol: 2 puff(s) inhaled 2 times a day  Vitamin D2 50,000 intl units (1.25 mg) oral capsule: 1 cap(s) orally once a week   calcitriol 0.25 mcg oral capsule: 1 cap(s) orally once a day  calcium acetate 667 mg oral tablet: 2 tab(s) orally every 8 hours MDD: 6  calcium carbonate 1250 mg (500 mg elemental calcium) oral tablet: 1 tab(s) orally 2 times a day  carvedilol 12.5 mg oral tablet: 1 tab(s) orally 2 times a day  ferrous sulfate 325 mg (65 mg elemental iron) oral tablet: 1 tab(s) orally once a day  levalbuterol 45 mcg/inh inhalation aerosol: 2 puff(s) inhaled every 6 hours as needed for  shortness of breath and/or wheezing  lisinopril 10 mg oral tablet: 1 tab(s) orally once a day  Norvasc 10 mg oral tablet: 1 tab(s) orally once a day  sodium bicarbonate 650 mg oral tablet: 1 tab(s) orally 3 times a day  Symbicort 160 mcg-4.5 mcg/inh inhalation aerosol: 2 puff(s) inhaled 2 times a day  Vitamin D2 50,000 intl units (1.25 mg) oral capsule: 1 cap(s) orally once a week

## 2023-06-12 NOTE — PROGRESS NOTE ADULT - SUBJECTIVE AND OBJECTIVE BOX
CC: The patient is a 37y Female admitted for hypocalcemia and HD initiation.     INTERVAL EVENTS: KRYSTA    SUBJECTIVE / INTERVAL HPI: Patient seen and examined at bedside.     ROS: negative unless otherwise stated above.    VITAL SIGNS:  Vital Signs Last 24 Hrs  T(C): 36.9 (12 Jun 2023 05:45), Max: 37.6 (12 Jun 2023 01:07)  T(F): 98.4 (12 Jun 2023 05:45), Max: 99.7 (12 Jun 2023 01:07)  HR: 78 (12 Jun 2023 06:19) (78 - 94)  BP: 121/83 (12 Jun 2023 06:19) (121/83 - 147/78)  BP(mean): 99 (12 Jun 2023 06:19) (97 - 108)  RR: 17 (12 Jun 2023 06:19) (16 - 18)  SpO2: 100% (11 Jun 2023 16:45) (100% - 100%)    Parameters below as of 12 Jun 2023 06:19  Patient On (Oxygen Delivery Method): room air      PHYSICAL EXAM:  Constitutional: young female resting comfortably; NAD  HEENT: NC/AT, anicteric sclera, MMM  Neck: supple  Respiratory: CTA B/L  Cardiac: +S1/S2; RRR  Gastrointestinal: soft, NT/ND  Extremities: WWP, no clubbing or cyanosis; 1+ b/l LE edema  Vascular: 2+ radial, DP/PT pulses B/L. R IJ Permacath in place, dressings c/d/i.   Dermatologic: skin warm, dry and intact  Neurologic: AAOx3, no focal deficits  Psychiatric: calm, cooperative, behaviors are appropriate    MEDICATIONS:  MEDICATIONS  (STANDING):  amLODIPine   Tablet 10 milliGRAM(s) Oral daily  budesonide 160 MICROgram(s)/formoterol 4.5 MICROgram(s) Inhaler 2 Puff(s) Inhalation two times a day  calcitriol   Capsule 1 MICROGram(s) Oral every 12 hours  calcium acetate 1334 milliGRAM(s) Oral three times a day with meals  calcium carbonate   1250 mG (OsCal) 1 Tablet(s) Oral two times a day  carvedilol 12.5 milliGRAM(s) Oral every 12 hours  epoetin liz-epbx (RETACRIT) Injectable 08152 Unit(s) IV Push once  lisinopril 10 milliGRAM(s) Oral daily  sodium bicarbonate 650 milliGRAM(s) Oral three times a day  trimethobenzamide 300 milliGRAM(s) Oral every 8 hours    MEDICATIONS  (PRN):  acetaminophen     Tablet .. 650 milliGRAM(s) Oral every 6 hours PRN Temp greater or equal to 38C (100.4F), Mild Pain (1 - 3)  levalbuterol Inhalation 0.31 milliGRAM(s) Inhalation every 6 hours PRN shortness of breath  melatonin 3 milliGRAM(s) Oral at bedtime PRN Insomnia  ondansetron Injectable 4 milliGRAM(s) IV Push every 8 hours PRN Nausea and/or Vomiting      ALLERGIES:  Allergies    No Known Allergies    Intolerances        LABS:                        7.6    5.56  )-----------( 102      ( 12 Jun 2023 05:30 )             23.5     06-12    143  |  106  |  65<H>  ----------------------------<  86  4.2   |  25  |  13.45<H>    Ca    7.6<L>      12 Jun 2023 05:30  Phos  5.6     06-12  Mg     1.8     06-12    TPro  5.2<L>  /  Alb  3.1<L>  /  TBili  0.2  /  DBili  x   /  AST  7<L>  /  ALT  8<L>  /  AlkPhos  46  06-12      RADIOLOGY & ADDITIONAL TESTS: Reviewed.

## 2023-06-13 DIAGNOSIS — D69.6 THROMBOCYTOPENIA, UNSPECIFIED: ICD-10-CM

## 2023-06-13 LAB
ALBUMIN SERPL ELPH-MCNC: 3.2 G/DL — LOW (ref 3.3–5)
ALP SERPL-CCNC: 42 U/L — SIGNIFICANT CHANGE UP (ref 40–120)
ALT FLD-CCNC: 6 U/L — LOW (ref 10–45)
ANION GAP SERPL CALC-SCNC: 11 MMOL/L — SIGNIFICANT CHANGE UP (ref 5–17)
AST SERPL-CCNC: 8 U/L — LOW (ref 10–40)
BASOPHILS # BLD AUTO: 0.01 K/UL — SIGNIFICANT CHANGE UP (ref 0–0.2)
BASOPHILS # BLD AUTO: 0.01 K/UL — SIGNIFICANT CHANGE UP (ref 0–0.2)
BASOPHILS NFR BLD AUTO: 0.1 % — SIGNIFICANT CHANGE UP (ref 0–2)
BASOPHILS NFR BLD AUTO: 0.2 % — SIGNIFICANT CHANGE UP (ref 0–2)
BILIRUB SERPL-MCNC: 0.2 MG/DL — SIGNIFICANT CHANGE UP (ref 0.2–1.2)
BUN SERPL-MCNC: 47 MG/DL — HIGH (ref 7–23)
CALCIUM SERPL-MCNC: 7.5 MG/DL — LOW (ref 8.4–10.5)
CHLORIDE SERPL-SCNC: 103 MMOL/L — SIGNIFICANT CHANGE UP (ref 96–108)
CLOSURE TME COLL+EPINEP BLD: 76 K/UL — LOW (ref 150–400)
CO2 SERPL-SCNC: 26 MMOL/L — SIGNIFICANT CHANGE UP (ref 22–31)
CREAT SERPL-MCNC: 10.02 MG/DL — HIGH (ref 0.5–1.3)
EGFR: 5 ML/MIN/1.73M2 — LOW
EOSINOPHIL # BLD AUTO: 0.07 K/UL — SIGNIFICANT CHANGE UP (ref 0–0.5)
EOSINOPHIL # BLD AUTO: 0.09 K/UL — SIGNIFICANT CHANGE UP (ref 0–0.5)
EOSINOPHIL NFR BLD AUTO: 1.1 % — SIGNIFICANT CHANGE UP (ref 0–6)
EOSINOPHIL NFR BLD AUTO: 1.2 % — SIGNIFICANT CHANGE UP (ref 0–6)
EOSINOPHIL NFR BLD AUTO: 2 % — SIGNIFICANT CHANGE UP (ref 0–6)
GLUCOSE SERPL-MCNC: 84 MG/DL — SIGNIFICANT CHANGE UP (ref 70–99)
HAPTOGLOB SERPL-MCNC: 106 MG/DL — SIGNIFICANT CHANGE UP (ref 34–200)
HCT VFR BLD CALC: 21.9 % — LOW (ref 34.5–45)
HCT VFR BLD CALC: 23.6 % — LOW (ref 34.5–45)
HCT VFR BLD CALC: 25.3 % — LOW (ref 34.5–45)
HEPARIN-PF4 AB RESULT: <0.6 U/ML — SIGNIFICANT CHANGE UP (ref 0–0.9)
HGB BLD-MCNC: 7.1 G/DL — LOW (ref 11.5–15.5)
HGB BLD-MCNC: 7.7 G/DL — LOW (ref 11.5–15.5)
HGB BLD-MCNC: 8.2 G/DL — LOW (ref 11.5–15.5)
HYPOCHROMIA BLD QL: SLIGHT — SIGNIFICANT CHANGE UP
IMM GRANULOCYTES NFR BLD AUTO: 0.5 % — SIGNIFICANT CHANGE UP (ref 0–0.9)
IMM GRANULOCYTES NFR BLD AUTO: 0.5 % — SIGNIFICANT CHANGE UP (ref 0–0.9)
LDH SERPL L TO P-CCNC: 256 U/L — HIGH (ref 50–242)
LYMPHOCYTES # BLD AUTO: 1.22 K/UL — SIGNIFICANT CHANGE UP (ref 1–3.3)
LYMPHOCYTES # BLD AUTO: 1.31 K/UL — SIGNIFICANT CHANGE UP (ref 1–3.3)
LYMPHOCYTES # BLD AUTO: 16.3 % — SIGNIFICANT CHANGE UP (ref 13–44)
LYMPHOCYTES # BLD AUTO: 20.4 % — SIGNIFICANT CHANGE UP (ref 13–44)
LYMPHOCYTES # BLD AUTO: 22 % — SIGNIFICANT CHANGE UP (ref 13–44)
MAGNESIUM SERPL-MCNC: 1.8 MG/DL — SIGNIFICANT CHANGE UP (ref 1.6–2.6)
MANUAL SMEAR VERIFICATION: SIGNIFICANT CHANGE UP
MCHC RBC-ENTMCNC: 29.3 PG — SIGNIFICANT CHANGE UP (ref 27–34)
MCHC RBC-ENTMCNC: 29.4 PG — SIGNIFICANT CHANGE UP (ref 27–34)
MCHC RBC-ENTMCNC: 29.5 PG — SIGNIFICANT CHANGE UP (ref 27–34)
MCHC RBC-ENTMCNC: 32.4 GM/DL — SIGNIFICANT CHANGE UP (ref 32–36)
MCHC RBC-ENTMCNC: 32.4 GM/DL — SIGNIFICANT CHANGE UP (ref 32–36)
MCHC RBC-ENTMCNC: 32.6 GM/DL — SIGNIFICANT CHANGE UP (ref 32–36)
MCV RBC AUTO: 90.1 FL — SIGNIFICANT CHANGE UP (ref 80–100)
MCV RBC AUTO: 90.5 FL — SIGNIFICANT CHANGE UP (ref 80–100)
MCV RBC AUTO: 91 FL — SIGNIFICANT CHANGE UP (ref 80–100)
METAMYELOCYTES # FLD: 1 % — HIGH
MONOCYTES # BLD AUTO: 0.71 K/UL — SIGNIFICANT CHANGE UP (ref 0–0.9)
MONOCYTES # BLD AUTO: 0.73 K/UL — SIGNIFICANT CHANGE UP (ref 0–0.9)
MONOCYTES NFR BLD AUTO: 11.1 % — SIGNIFICANT CHANGE UP (ref 2–14)
MONOCYTES NFR BLD AUTO: 3 % — SIGNIFICANT CHANGE UP (ref 2–14)
MONOCYTES NFR BLD AUTO: 9.7 % — SIGNIFICANT CHANGE UP (ref 2–14)
NEUTROPHILS # BLD AUTO: 4.28 K/UL — SIGNIFICANT CHANGE UP (ref 1.8–7.4)
NEUTROPHILS # BLD AUTO: 5.41 K/UL — SIGNIFICANT CHANGE UP (ref 1.8–7.4)
NEUTROPHILS NFR BLD AUTO: 66.7 % — SIGNIFICANT CHANGE UP (ref 43–77)
NEUTROPHILS NFR BLD AUTO: 72 % — SIGNIFICANT CHANGE UP (ref 43–77)
NEUTROPHILS NFR BLD AUTO: 72.2 % — SIGNIFICANT CHANGE UP (ref 43–77)
NRBC # BLD: 0 /100 WBCS — SIGNIFICANT CHANGE UP (ref 0–0)
OVALOCYTES BLD QL SMEAR: SLIGHT — SIGNIFICANT CHANGE UP
PF4 HEPARIN CMPLX AB SER-ACNC: NEGATIVE — SIGNIFICANT CHANGE UP
PHOSPHATE SERPL-MCNC: 3.8 MG/DL — SIGNIFICANT CHANGE UP (ref 2.5–4.5)
PLAT MORPH BLD: NORMAL — SIGNIFICANT CHANGE UP
PLATELET # BLD AUTO: 71 K/UL — LOW (ref 150–400)
PLATELET # BLD AUTO: 84 K/UL — LOW (ref 150–400)
PLATELET # BLD AUTO: 90 K/UL — LOW (ref 150–400)
POTASSIUM SERPL-MCNC: 4 MMOL/L — SIGNIFICANT CHANGE UP (ref 3.5–5.3)
POTASSIUM SERPL-SCNC: 4 MMOL/L — SIGNIFICANT CHANGE UP (ref 3.5–5.3)
PROT SERPL-MCNC: 5.5 G/DL — LOW (ref 6–8.3)
RBC # BLD: 2.42 M/UL — LOW (ref 3.8–5.2)
RBC # BLD: 2.62 M/UL — LOW (ref 3.8–5.2)
RBC # BLD: 2.78 M/UL — LOW (ref 3.8–5.2)
RBC # FLD: 13.1 % — SIGNIFICANT CHANGE UP (ref 10.3–14.5)
RBC # FLD: 13.1 % — SIGNIFICANT CHANGE UP (ref 10.3–14.5)
RBC # FLD: 13.2 % — SIGNIFICANT CHANGE UP (ref 10.3–14.5)
RBC BLD AUTO: ABNORMAL
RETICS #: 75.6 K/UL — SIGNIFICANT CHANGE UP (ref 25–125)
RETICS/RBC NFR: 2.7 % — HIGH (ref 0.5–2.5)
SODIUM SERPL-SCNC: 140 MMOL/L — SIGNIFICANT CHANGE UP (ref 135–145)
WBC # BLD: 6.41 K/UL — SIGNIFICANT CHANGE UP (ref 3.8–10.5)
WBC # BLD: 7.5 K/UL — SIGNIFICANT CHANGE UP (ref 3.8–10.5)
WBC # BLD: 7.91 K/UL — SIGNIFICANT CHANGE UP (ref 3.8–10.5)
WBC # FLD AUTO: 6.41 K/UL — SIGNIFICANT CHANGE UP (ref 3.8–10.5)
WBC # FLD AUTO: 7.5 K/UL — SIGNIFICANT CHANGE UP (ref 3.8–10.5)
WBC # FLD AUTO: 7.91 K/UL — SIGNIFICANT CHANGE UP (ref 3.8–10.5)

## 2023-06-13 PROCEDURE — 99233 SBSQ HOSP IP/OBS HIGH 50: CPT | Mod: GC

## 2023-06-13 PROCEDURE — 90937 HEMODIALYSIS REPEATED EVAL: CPT

## 2023-06-13 PROCEDURE — 93306 TTE W/DOPPLER COMPLETE: CPT | Mod: 26

## 2023-06-13 RX ADMIN — Medication 1334 MILLIGRAM(S): at 18:56

## 2023-06-13 RX ADMIN — BUDESONIDE AND FORMOTEROL FUMARATE DIHYDRATE 2 PUFF(S): 160; 4.5 AEROSOL RESPIRATORY (INHALATION) at 05:54

## 2023-06-13 RX ADMIN — CARVEDILOL PHOSPHATE 12.5 MILLIGRAM(S): 80 CAPSULE, EXTENDED RELEASE ORAL at 22:23

## 2023-06-13 RX ADMIN — Medication 3 MILLIGRAM(S): at 23:29

## 2023-06-13 RX ADMIN — AMLODIPINE BESYLATE 10 MILLIGRAM(S): 2.5 TABLET ORAL at 05:57

## 2023-06-13 RX ADMIN — Medication 1334 MILLIGRAM(S): at 11:53

## 2023-06-13 RX ADMIN — Medication 1334 MILLIGRAM(S): at 08:04

## 2023-06-13 RX ADMIN — CARVEDILOL PHOSPHATE 12.5 MILLIGRAM(S): 80 CAPSULE, EXTENDED RELEASE ORAL at 11:54

## 2023-06-13 RX ADMIN — Medication 325 MILLIGRAM(S): at 11:53

## 2023-06-13 RX ADMIN — LISINOPRIL 10 MILLIGRAM(S): 2.5 TABLET ORAL at 05:57

## 2023-06-13 RX ADMIN — BUDESONIDE AND FORMOTEROL FUMARATE DIHYDRATE 2 PUFF(S): 160; 4.5 AEROSOL RESPIRATORY (INHALATION) at 18:56

## 2023-06-13 NOTE — DIETITIAN INITIAL EVALUATION ADULT - PERTINENT LABORATORY DATA
06-13    140  |  103  |  47<H>  ----------------------------<  84  4.0   |  26  |  10.02<H>    Ca    7.5<L>      13 Jun 2023 05:30  Phos  3.8     06-13  Mg     1.8     06-13    TPro  5.5<L>  /  Alb  3.2<L>  /  TBili  0.2  /  DBili  x   /  AST  8<L>  /  ALT  6<L>  /  AlkPhos  42  06-13  A1C with Estimated Average Glucose Result: 5.1 % (06-08-23 @ 20:55)

## 2023-06-13 NOTE — DIETITIAN INITIAL EVALUATION ADULT - PROBLEM SELECTOR PLAN 1
Patient found to have hypocalcemia to 5.3 on admission with EKG changes (prolonged QTc), admitted to telemetry. Likely 2/2 worsening renal failure.  - s/p 1g Ca x2  Plan:  - 2g calcium IV  - f/u repeat Ca in AM  - phoslo TID  - monitor EKGs  - f/u PTH, vit D studies

## 2023-06-13 NOTE — PROGRESS NOTE ADULT - ATTENDING COMMENTS
HTN, asthma with ESRD started on HD. Tolerated well. Significant thrombocytopenia (heparin was never given, possibly from HD dialysate, plan to change dialysate and observe platelet count till tomorrow). Rest as above.

## 2023-06-13 NOTE — DIETITIAN INITIAL EVALUATION ADULT - OTHER INFO
37F PMH of HTN, asthma, CKD Stage 4 not on HD (baseline Cr 2.92), PSH lap paloma, , bariatric sleeve gastrectomy (2019) presents from outpatient PCP for worsening renal function, admitted for HD initiation, on telemetry for hypocalcemia w/ EKG changes.      Pt assessed at bedside on 7LA. Rx and labs reviewed. Pt presents with no overt signs of nutritional wasting. Pt reports a good appetite and reflective PO intake; meal tray at bedside ~75% consumed. Pt with hx of gastric sleeve in 2019; pt reports a stable wt trend at this time with no significant changes in appetite, PO intake, or wt PTA. Pt is being managed for platelets and Ca correction. Pt continues on HD; GFR 5, BUN 47, and Cr 10.02 today. No c/o NVCD or difficult chew/swallow. NKA to food. RDN will continue to reassess, intervene, and monitor as appropriate.     Pain: 0 per chart review   GI: Abdomen ND/NT, +BS x4, LBM   Skin: WDI, +2 BLE

## 2023-06-13 NOTE — PROGRESS NOTE ADULT - SUBJECTIVE AND OBJECTIVE BOX
Patient is a 37y Female seen and evaluated at bedside. no acute events overnight patient tolerated HD #2 yesterday /66 patient noted to be more anemic and thrombocytopenic hgb 7.1 platelets 71 K 4.0 bicarb 26       Meds:    acetaminophen     Tablet .. 650 every 6 hours PRN  amLODIPine   Tablet 10 daily  budesonide 160 MICROgram(s)/formoterol 4.5 MICROgram(s) Inhaler 2 two times a day  calcium acetate 1334 three times a day with meals  carvedilol 12.5 every 12 hours  ferrous    sulfate 325 daily  levalbuterol Inhalation 0.31 every 6 hours PRN  lisinopril 10 daily  melatonin 3 at bedtime PRN  ondansetron Injectable 4 every 8 hours PRN      T(C): , Max: 37.2 (06-12-23 @ 18:25)  T(F): , Max: 98.9 (06-12-23 @ 18:25)  HR: 92 (06-13-23 @ 11:52)  BP: 135/66 (06-13-23 @ 11:52)  BP(mean): 95 (06-13-23 @ 11:52)  RR: 18 (06-13-23 @ 11:52)  SpO2: 100% (06-12-23 @ 20:43)  Wt(kg): --    06-12 @ 07:01  -  06-13 @ 07:00  --------------------------------------------------------  IN: 0 mL / OUT: 1000 mL / NET: -1000 mL          Review of Systems:  all other ROS negative       PHYSICAL EXAM:  GENERAL: well-developed, well nourished, alert, no acute distress at present  CHEST/LUNG: Clear to auscultation bilaterally  HEART: normal S1S2, RRR  ABDOMEN: Soft, Nontender, +BS,   EXTREMITIES: No clubbing, cyanosis, or edema   SKIN: no lesions or rashes   ACCESS: TDC       LABS:                        7.1    6.41  )-----------( 71       ( 13 Jun 2023 05:30 )             21.9     06-13    140  |  103  |  47<H>  ----------------------------<  84  4.0   |  26  |  10.02<H>    Ca    7.5<L>      13 Jun 2023 05:30  Phos  3.8     06-13  Mg     1.8     06-13    TPro  5.5<L>  /  Alb  3.2<L>  /  TBili  0.2  /  DBili  x   /  AST  8<L>  /  ALT  6<L>  /  AlkPhos  42  06-13                RADIOLOGY & ADDITIONAL STUDIES:

## 2023-06-13 NOTE — PROGRESS NOTE ADULT - PROBLEM SELECTOR PLAN 8
F: tolerating PO, no IVF  E: replete K<4, Mg<1.8, Phos<3, Ca<8.5   N: renal diet    VTE Prophylaxis: SCDs  GI: not needed  C: Full Code  D: 7Lach Patient with 1 month history of diarrhea, associated with eating. H/o bariatric surgery. No significant changes today. No infectious symptoms.  - monitor BMs  - nutrition consult

## 2023-06-13 NOTE — PROGRESS NOTE ADULT - ATTENDING COMMENTS
Plts downtrending since HD started. Hb also downtrending despite PRBC, IV Fe and epogen. Concerned for dialyzer-reaction causing thrombocytopenia, hapto/ldh wnl pending peripheral smear but hemolysis seems less likely.   Plan to dialyze with Revaclear dialyzer rather than optiflux. Recheck Hb/Plt post dialysis. If stable, can cont with discharge plans.

## 2023-06-13 NOTE — PROGRESS NOTE ADULT - SUBJECTIVE AND OBJECTIVE BOX
CC: The patient is a 37y Female admitted for HD initiation.     INTERVAL EVENTS: KRYSTA    SUBJECTIVE / INTERVAL HPI: Patient seen and examined at bedside.     ROS: negative unless otherwise stated above.    VITAL SIGNS:  Vital Signs Last 24 Hrs  T(C): 37.2 (13 Jun 2023 06:30), Max: 37.2 (12 Jun 2023 18:25)  T(F): 98.9 (13 Jun 2023 06:30), Max: 98.9 (12 Jun 2023 18:25)  HR: 86 (13 Jun 2023 06:04) (68 - 92)  BP: 171/76 (13 Jun 2023 06:04) (130/67 - 171/76)  BP(mean): 108 (13 Jun 2023 06:04) (92 - 113)  RR: 18 (13 Jun 2023 06:04) (17 - 19)  SpO2: 100% (12 Jun 2023 20:43) (100% - 100%)    Parameters below as of 13 Jun 2023 06:04  Patient On (Oxygen Delivery Method): room air        06-12-23 @ 07:01  -  06-13-23 @ 07:00  --------------------------------------------------------  IN: 0 mL / OUT: 1000 mL / NET: -1000 mL        PHYSICAL EXAM:  Constitutional: resting comfortably; NAD  HEENT: NC/AT, PERRL, EOMI, anicteric sclera, MMM  Neck: supple; no JVD or thyromegaly  Respiratory: CTA B/L; no W/R/R, no retractions  Cardiac: +S1/S2; RRR; no M/R/G  Gastrointestinal: soft, NT/ND; no rebound or guarding; +BSx4  Extremities: WWP, no clubbing or cyanosis; no peripheral edema  Musculoskeletal: NROM x4; no joint swelling, tenderness or erythema  Vascular: 2+ radial, DP/PT pulses B/L  Dermatologic: skin warm, dry and intact; no rashes, wounds, or scars  Neurologic: AAOx3, no focal deficits  Psychiatric: calm, cooperative, behaviors are appropriate, denies SI/HI    MEDICATIONS:  MEDICATIONS  (STANDING):  amLODIPine   Tablet 10 milliGRAM(s) Oral daily  budesonide 160 MICROgram(s)/formoterol 4.5 MICROgram(s) Inhaler 2 Puff(s) Inhalation two times a day  calcium acetate 1334 milliGRAM(s) Oral three times a day with meals  carvedilol 12.5 milliGRAM(s) Oral every 12 hours  ferrous    sulfate 325 milliGRAM(s) Oral daily  lisinopril 10 milliGRAM(s) Oral daily    MEDICATIONS  (PRN):  acetaminophen     Tablet .. 650 milliGRAM(s) Oral every 6 hours PRN Temp greater or equal to 38C (100.4F), Mild Pain (1 - 3)  levalbuterol Inhalation 0.31 milliGRAM(s) Inhalation every 6 hours PRN shortness of breath  melatonin 3 milliGRAM(s) Oral at bedtime PRN Insomnia  ondansetron Injectable 4 milliGRAM(s) IV Push every 8 hours PRN Nausea and/or Vomiting      ALLERGIES:  Allergies    No Known Allergies    Intolerances        LABS:                        7.1    6.41  )-----------( 71       ( 13 Jun 2023 05:30 )             21.9     06-13    140  |  103  |  47<H>  ----------------------------<  84  4.0   |  26  |  10.02<H>    Ca    7.5<L>      13 Jun 2023 05:30  Phos  3.8     06-13  Mg     1.8     06-13    TPro  5.5<L>  /  Alb  3.2<L>  /  TBili  0.2  /  DBili  x   /  AST  8<L>  /  ALT  6<L>  /  AlkPhos  42  06-13        CAPILLARY BLOOD GLUCOSE          RADIOLOGY & ADDITIONAL TESTS: Reviewed.   CC: The patient is a 37y Female admitted for HD initiation.     INTERVAL EVENTS: KRYSTA    SUBJECTIVE / INTERVAL HPI: Patient seen and examined at bedside. Feeling fine and is ready to go home.     ROS: negative unless otherwise stated above.    VITAL SIGNS:  Vital Signs Last 24 Hrs  T(C): 37.2 (13 Jun 2023 06:30), Max: 37.2 (12 Jun 2023 18:25)  T(F): 98.9 (13 Jun 2023 06:30), Max: 98.9 (12 Jun 2023 18:25)  HR: 86 (13 Jun 2023 06:04) (68 - 92)  BP: 171/76 (13 Jun 2023 06:04) (130/67 - 171/76)  BP(mean): 108 (13 Jun 2023 06:04) (92 - 113)  RR: 18 (13 Jun 2023 06:04) (17 - 19)  SpO2: 100% (12 Jun 2023 20:43) (100% - 100%)    Parameters below as of 13 Jun 2023 06:04  Patient On (Oxygen Delivery Method): room air      06-12-23 @ 07:01  -  06-13-23 @ 07:00  --------------------------------------------------------  IN: 0 mL / OUT: 1000 mL / NET: -1000 mL      PHYSICAL EXAM:  Constitutional: young female resting comfortably; NAD  HEENT: NC/AT, anicteric sclera, MMM  Neck: supple  Respiratory: CTA B/L  Cardiac: +S1/S2; RRR  Gastrointestinal: soft, NT/ND  Extremities: WWP, no clubbing or cyanosis; 1+ b/l LE edema  Vascular: 2+ radial, DP/PT pulses B/L. R IJ Permacath in place, dressings c/d/i.   Dermatologic: skin warm, dry and intact  Neurologic: AAOx3, no focal deficits  Psychiatric: calm, cooperative, behaviors are appropriate    MEDICATIONS:  MEDICATIONS  (STANDING):  amLODIPine   Tablet 10 milliGRAM(s) Oral daily  budesonide 160 MICROgram(s)/formoterol 4.5 MICROgram(s) Inhaler 2 Puff(s) Inhalation two times a day  calcium acetate 1334 milliGRAM(s) Oral three times a day with meals  carvedilol 12.5 milliGRAM(s) Oral every 12 hours  ferrous    sulfate 325 milliGRAM(s) Oral daily  lisinopril 10 milliGRAM(s) Oral daily    MEDICATIONS  (PRN):  acetaminophen     Tablet .. 650 milliGRAM(s) Oral every 6 hours PRN Temp greater or equal to 38C (100.4F), Mild Pain (1 - 3)  levalbuterol Inhalation 0.31 milliGRAM(s) Inhalation every 6 hours PRN shortness of breath  melatonin 3 milliGRAM(s) Oral at bedtime PRN Insomnia  ondansetron Injectable 4 milliGRAM(s) IV Push every 8 hours PRN Nausea and/or Vomiting      ALLERGIES:  Allergies    No Known Allergies    Intolerances        LABS:                        7.1    6.41  )-----------( 71       ( 13 Jun 2023 05:30 )             21.9     06-13    140  |  103  |  47<H>  ----------------------------<  84  4.0   |  26  |  10.02<H>    Ca    7.5<L>      13 Jun 2023 05:30  Phos  3.8     06-13  Mg     1.8     06-13    TPro  5.5<L>  /  Alb  3.2<L>  /  TBili  0.2  /  DBili  x   /  AST  8<L>  /  ALT  6<L>  /  AlkPhos  42  06-13      RADIOLOGY & ADDITIONAL TESTS: Reviewed.

## 2023-06-13 NOTE — DIETITIAN INITIAL EVALUATION ADULT - PERTINENT MEDS FT
MEDICATIONS  (STANDING):  amLODIPine   Tablet 10 milliGRAM(s) Oral daily  budesonide 160 MICROgram(s)/formoterol 4.5 MICROgram(s) Inhaler 2 Puff(s) Inhalation two times a day  calcium acetate 1334 milliGRAM(s) Oral three times a day with meals  carvedilol 12.5 milliGRAM(s) Oral every 12 hours  ferrous    sulfate 325 milliGRAM(s) Oral daily  lisinopril 10 milliGRAM(s) Oral daily    MEDICATIONS  (PRN):  acetaminophen     Tablet .. 650 milliGRAM(s) Oral every 6 hours PRN Temp greater or equal to 38C (100.4F), Mild Pain (1 - 3)  levalbuterol Inhalation 0.31 milliGRAM(s) Inhalation every 6 hours PRN shortness of breath  melatonin 3 milliGRAM(s) Oral at bedtime PRN Insomnia  ondansetron Injectable 4 milliGRAM(s) IV Push every 8 hours PRN Nausea and/or Vomiting

## 2023-06-13 NOTE — PROGRESS NOTE ADULT - PROBLEM SELECTOR PLAN 4
Elevated BNP to 42K. Patient with renal failure which may elevate BNP, however will r/o heart failure.  - f/u TTE. Patient with Hb of 7.9 on admission. Symptomatic with worsening fatigue and PIMENTEL for 4 months. No known history of anemia. Patient currently menstruating and endorses abnormal/increased bleeding since Sept 2022.  Otherwise no other active sources of bleeding. Never had colonoscopy. Iron studies showing low iron. Likely combination of AoCD from ESRD and TOÑA. S/p 1 unit pRBC.  - Given iron sucrose IV 500mg once  - Started ferrous sulfate 325mg once daily

## 2023-06-13 NOTE — PROGRESS NOTE ADULT - PROBLEM SELECTOR PLAN 3
Patient with Hb of 7.9 on admission. Symptomatic with worsening fatigue and PIMENTEL for 4 months. No known history of anemia. Patient currently menstruating and endorses abnormal/increased bleeding since Sept 2022.  Otherwise no other active sources of bleeding. Never had colonoscopy. Iron studies showing low iron. Likely combination of AoCD from ESRD and TOÑA. S/p 1 unit pRBC.  - Given iron sucrose IV 500mg once  - Started ferrous sulfate 325mg once daily RESOLVING. Patient found to have hypocalcemia to 5.3 on admission with EKG changes (prolonged QTc), admitted to telemetry. Likely 2/2 worsening renal failure. S/p 1g Ca x2. QTc improving as Ca2+ does.  On 6/10 AM: sCa 6.7, gave 2g Ca, improved to 7.1. PTH: 792(H), VitD-25: 28.8(L), VitD-1,25: 21.8. Calcium now 8.2-8.3, corrected for hypoalbuminemia.   - D/w Nephro, will use higher calcium bath for HD iso hypoCa2+  - 6/11 QTc 459  - phoslo TID

## 2023-06-13 NOTE — DIETITIAN INITIAL EVALUATION ADULT - ADD RECOMMEND
-Continue current diet   -Encourage good PO intake   -Honor food preferences as able   -Monitor chemistry, GI fxn, and skin integrity

## 2023-06-13 NOTE — PROGRESS NOTE ADULT - PROBLEM SELECTOR PLAN 2
#New HD initiation  Patient presenting from outpatient PCP provider with elevated Cr and BUN for dialysis initiation. Nephrology consulted. On admission Cr elevated to 16.98 w/ BUN 94. Sxs of fatigue. 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 Neves. Home mediations: bicarb 650 TID, calcitriol 0.25 daily, vit D2 50K units weekly. S/p R IJ Permacath 6/9, s/p 1st HD session 6/10 - tolerated well. US obtained for vein mapping for possible AV fistula placement outpatient, however patient would like to discuss peritoneal dialysis vs kidney transplant. Pt will follow up for HD sessions in the mean time.  - c/w home bicarb and calcitriol  - hold vit D2 50K inpatient, restart as outpatient  - F/u with nephrology Dr. Neves

## 2023-06-13 NOTE — PROGRESS NOTE ADULT - PROBLEM SELECTOR PLAN 6
Home medications: symbicort 160/4.5 2 puffs BID, levalbuterol tartrate HFA 45mcg PRN  - c/w home meds Home medications: lisinopril 10mg daily, coreg 12.5 BID, amlodipine 10mg daily. Per patient has not been taking lisinopril because ran out and was having difficulties with insurance.  - c/w home meds

## 2023-06-13 NOTE — PROGRESS NOTE ADULT - PROBLEM SELECTOR PROBLEM 7
[address] [place] [address]                                 OPERATIVE REPORT    PATIENT NAME: Walter Mcclellan                       :        1961  MED REC NO:   [id number]                           ROOM:       [id number]  ACCOUNT NO:   [de-identified]                           ADMIT DATE: 2018  PROVIDER:     Salvador Justin. MD Marylelen    DATE OF PROCEDURE:  07/15/2018    PREOPERATIVE DIAGNOSES:  1. Acute appendicitis. 2.  Small umbilical hernia. POSTOPERATIVE DIAGNOSES:  1. Acute appendicitis. 2.  Small umbilical hernia. OPERATION:  1. Laparoscopic appendectomy. 2.  Primary repair of umbilical hernia. SURGEON:  Salvador Justin. MD Maryellen    ANESTHESIA:  General.    COMPLICATIONS:  None. INDICATION FOR PROCEDURE:  The patient is a 80-year-old white female with  signs and symptoms of acute appendicitis. She was also found to have a  small umbilical hernia on CT scan findings. The patient did have a small  umbilical hernia. It was repaired primarily with 2-0 Ethibond sutures. DESCRIPTION OF PROCEDURE:  The patient was brought to the operating suite,  placed supine on the operating room table. After adequate inhalational  anesthesia was administered, the patient's abdomen was prepped and draped  in usual sterile fashion. Incision was made below the umbilicus. There  was a small umbilical hernia, and I placed a Veress needle right through  there and insufflated to a pressure of 15. We then easily placed a trocar  through the hernia defect and there were some adhesions up to the right  upper quadrant of the bowel, and certainly, I elected to leave that alone  as I feared for injuring the bowel. The appendix could be seen in the  right lower quadrant. A 12-mm lateral abdominal wall port was placed. A  5-mm suprapubic trocar was placed.   The appendix was grasped and I was able  to pass an Endo-NEIL across the base of the appendix  it from the  cecum and then fired an Endo-NEIL across the mesoappendix and then delivered  the appendix out of the abdominal cavity via the right lateral stab wound  with an EndoCatch. We then irrigated the area, there was no evidence of  bleeding ongoing. The pelvis was irrigated. It should be noted the tube  and ovary were somewhat high-riding and attached to the pelvic sidewall. Then, closure was begun. The trocars were removed as air was aspirated out  of the abdominal cavity. Interrupted 4-0 Vicryl was used to close the  suprapubic and right lateral abdominal wall port. I placed 2-0 Ethibond  sutures in to repair the hernia and then an interrupted 4-0 Vicryl was used  to close the subcutaneous tissue. The patient tolerated the  procedure  well. ABBE LAZO [facility], MD    D: 07/15/2018 3:25:39       T: 07/15/2018 3:27:44     [email or web address]  Job#: [id number]     Doc#: [id number]    CC: Diarrhea Asthma

## 2023-06-13 NOTE — PROGRESS NOTE ADULT - PROBLEM/PLAN-8
none
DISPLAY PLAN FREE TEXT

## 2023-06-13 NOTE — PROGRESS NOTE ADULT - PROBLEM SELECTOR PLAN 1
RESOLVING. Patient found to have hypocalcemia to 5.3 on admission with EKG changes (prolonged QTc), admitted to telemetry. Likely 2/2 worsening renal failure. S/p 1g Ca x2. QTc improving as Ca2+ does.  On 6/10 AM: sCa 6.7, gave 2g Ca, improved to 7.1. PTH: 792(H), VitD-25: 28.8(L), VitD-1,25: 21.8. Calcium now 8.2-8.3, corrected for hypoalbuminemia.   - D/w Nephro, will use higher calcium bath for HD iso hypoCa2+  - 6/11 QTc 459  - phoslo TID Platelets greater than 50% decreased since admission, now at 71. No signs of bleeding. Concerning for HIT however patient did not receive any heparin products this admission, including heparin flushes for HD cath during dialysis. Anti-PF4 negative. Suspect this is from platelet consumption is from dialysate. Will speak to nephro about changing dialysate.   - F/u platelet blue top  - Monitor platelets after new dialysate

## 2023-06-13 NOTE — PROGRESS NOTE ADULT - PROBLEM SELECTOR PLAN 7
Patient with 1 month history of diarrhea, associated with eating. H/o bariatric surgery. No significant changes today. No infectious symptoms.  - monitor BMs  - nutrition consult Home medications: symbicort 160/4.5 2 puffs BID, levalbuterol tartrate HFA 45mcg PRN  - c/w home meds

## 2023-06-13 NOTE — PROGRESS NOTE ADULT - PROBLEM SELECTOR PLAN 5
Home medications: lisinopril 10mg daily, coreg 12.5 BID, amlodipine 10mg daily. Per patient has not been taking lisinopril because ran out and was having difficulties with insurance.  - c/w home meds Elevated BNP to 42K. Patient with renal failure which may elevate BNP, however will r/o heart failure.  - f/u TTE.

## 2023-06-14 ENCOUNTER — TRANSCRIPTION ENCOUNTER (OUTPATIENT)
Age: 38
End: 2023-06-14

## 2023-06-14 VITALS — TEMPERATURE: 99 F

## 2023-06-14 LAB
ALBUMIN SERPL ELPH-MCNC: 3.5 G/DL — SIGNIFICANT CHANGE UP (ref 3.3–5)
ALP SERPL-CCNC: 55 U/L — SIGNIFICANT CHANGE UP (ref 40–120)
ALT FLD-CCNC: 8 U/L — LOW (ref 10–45)
ANION GAP SERPL CALC-SCNC: 11 MMOL/L — SIGNIFICANT CHANGE UP (ref 5–17)
APTT BLD: 26.1 SEC — LOW (ref 27.5–35.5)
AST SERPL-CCNC: 9 U/L — LOW (ref 10–40)
BASOPHILS # BLD AUTO: 0.02 K/UL — SIGNIFICANT CHANGE UP (ref 0–0.2)
BASOPHILS NFR BLD AUTO: 0.3 % — SIGNIFICANT CHANGE UP (ref 0–2)
BILIRUB SERPL-MCNC: 0.2 MG/DL — SIGNIFICANT CHANGE UP (ref 0.2–1.2)
BUN SERPL-MCNC: 33 MG/DL — HIGH (ref 7–23)
CALCIUM SERPL-MCNC: 8.4 MG/DL — SIGNIFICANT CHANGE UP (ref 8.4–10.5)
CHLORIDE SERPL-SCNC: 102 MMOL/L — SIGNIFICANT CHANGE UP (ref 96–108)
CO2 SERPL-SCNC: 27 MMOL/L — SIGNIFICANT CHANGE UP (ref 22–31)
CREAT SERPL-MCNC: 8.18 MG/DL — HIGH (ref 0.5–1.3)
EGFR: 6 ML/MIN/1.73M2 — LOW
EOSINOPHIL # BLD AUTO: 0.08 K/UL — SIGNIFICANT CHANGE UP (ref 0–0.5)
EOSINOPHIL NFR BLD AUTO: 1.3 % — SIGNIFICANT CHANGE UP (ref 0–6)
GLUCOSE BLDC GLUCOMTR-MCNC: 93 MG/DL — SIGNIFICANT CHANGE UP (ref 70–99)
GLUCOSE SERPL-MCNC: 83 MG/DL — SIGNIFICANT CHANGE UP (ref 70–99)
HCT VFR BLD CALC: 25.1 % — LOW (ref 34.5–45)
HCT VFR BLD CALC: 25.3 % — LOW (ref 34.5–45)
HGB BLD-MCNC: 7.9 G/DL — LOW (ref 11.5–15.5)
HGB BLD-MCNC: 8 G/DL — LOW (ref 11.5–15.5)
IMM GRANULOCYTES NFR BLD AUTO: 0.3 % — SIGNIFICANT CHANGE UP (ref 0–0.9)
INR BLD: 1.02 — SIGNIFICANT CHANGE UP (ref 0.88–1.16)
LYMPHOCYTES # BLD AUTO: 1.48 K/UL — SIGNIFICANT CHANGE UP (ref 1–3.3)
LYMPHOCYTES # BLD AUTO: 23.9 % — SIGNIFICANT CHANGE UP (ref 13–44)
MAGNESIUM SERPL-MCNC: 1.8 MG/DL — SIGNIFICANT CHANGE UP (ref 1.6–2.6)
MCHC RBC-ENTMCNC: 29.3 PG — SIGNIFICANT CHANGE UP (ref 27–34)
MCHC RBC-ENTMCNC: 29.5 PG — SIGNIFICANT CHANGE UP (ref 27–34)
MCHC RBC-ENTMCNC: 31.5 GM/DL — LOW (ref 32–36)
MCHC RBC-ENTMCNC: 31.6 GM/DL — LOW (ref 32–36)
MCV RBC AUTO: 92.7 FL — SIGNIFICANT CHANGE UP (ref 80–100)
MCV RBC AUTO: 93.7 FL — SIGNIFICANT CHANGE UP (ref 80–100)
MONOCYTES # BLD AUTO: 0.49 K/UL — SIGNIFICANT CHANGE UP (ref 0–0.9)
MONOCYTES NFR BLD AUTO: 7.9 % — SIGNIFICANT CHANGE UP (ref 2–14)
NEUTROPHILS # BLD AUTO: 4.1 K/UL — SIGNIFICANT CHANGE UP (ref 1.8–7.4)
NEUTROPHILS NFR BLD AUTO: 66.3 % — SIGNIFICANT CHANGE UP (ref 43–77)
NRBC # BLD: 0 /100 WBCS — SIGNIFICANT CHANGE UP (ref 0–0)
NRBC # BLD: 0 /100 WBCS — SIGNIFICANT CHANGE UP (ref 0–0)
PHOSPHATE SERPL-MCNC: 3.3 MG/DL — SIGNIFICANT CHANGE UP (ref 2.5–4.5)
PLATELET # BLD AUTO: 89 K/UL — LOW (ref 150–400)
PLATELET # BLD AUTO: 96 K/UL — LOW (ref 150–400)
POTASSIUM SERPL-MCNC: 4.2 MMOL/L — SIGNIFICANT CHANGE UP (ref 3.5–5.3)
POTASSIUM SERPL-SCNC: 4.2 MMOL/L — SIGNIFICANT CHANGE UP (ref 3.5–5.3)
PROT SERPL-MCNC: 5.5 G/DL — LOW (ref 6–8.3)
PROTHROM AB SERPL-ACNC: 12.2 SEC — SIGNIFICANT CHANGE UP (ref 10.5–13.4)
RBC # BLD: 2.68 M/UL — LOW (ref 3.8–5.2)
RBC # BLD: 2.73 M/UL — LOW (ref 3.8–5.2)
RBC # FLD: 13.2 % — SIGNIFICANT CHANGE UP (ref 10.3–14.5)
RBC # FLD: 13.3 % — SIGNIFICANT CHANGE UP (ref 10.3–14.5)
SODIUM SERPL-SCNC: 140 MMOL/L — SIGNIFICANT CHANGE UP (ref 135–145)
WBC # BLD: 5.92 K/UL — SIGNIFICANT CHANGE UP (ref 3.8–10.5)
WBC # BLD: 6.19 K/UL — SIGNIFICANT CHANGE UP (ref 3.8–10.5)
WBC # FLD AUTO: 5.92 K/UL — SIGNIFICANT CHANGE UP (ref 3.8–10.5)
WBC # FLD AUTO: 6.19 K/UL — SIGNIFICANT CHANGE UP (ref 3.8–10.5)

## 2023-06-14 PROCEDURE — 76937 US GUIDE VASCULAR ACCESS: CPT

## 2023-06-14 PROCEDURE — 86923 COMPATIBILITY TEST ELECTRIC: CPT

## 2023-06-14 PROCEDURE — C1750: CPT

## 2023-06-14 PROCEDURE — 85730 THROMBOPLASTIN TIME PARTIAL: CPT

## 2023-06-14 PROCEDURE — 82962 GLUCOSE BLOOD TEST: CPT

## 2023-06-14 PROCEDURE — 86022 PLATELET ANTIBODIES: CPT

## 2023-06-14 PROCEDURE — 93306 TTE W/DOPPLER COMPLETE: CPT

## 2023-06-14 PROCEDURE — 80048 BASIC METABOLIC PNL TOTAL CA: CPT

## 2023-06-14 PROCEDURE — 81001 URINALYSIS AUTO W/SCOPE: CPT

## 2023-06-14 PROCEDURE — 86705 HEP B CORE ANTIBODY IGM: CPT

## 2023-06-14 PROCEDURE — 83550 IRON BINDING TEST: CPT

## 2023-06-14 PROCEDURE — 83615 LACTATE (LD) (LDH) ENZYME: CPT

## 2023-06-14 PROCEDURE — 96376 TX/PRO/DX INJ SAME DRUG ADON: CPT

## 2023-06-14 PROCEDURE — C1769: CPT

## 2023-06-14 PROCEDURE — 36415 COLL VENOUS BLD VENIPUNCTURE: CPT

## 2023-06-14 PROCEDURE — 84439 ASSAY OF FREE THYROXINE: CPT

## 2023-06-14 PROCEDURE — 82652 VIT D 1 25-DIHYDROXY: CPT

## 2023-06-14 PROCEDURE — 84466 ASSAY OF TRANSFERRIN: CPT

## 2023-06-14 PROCEDURE — 87340 HEPATITIS B SURFACE AG IA: CPT

## 2023-06-14 PROCEDURE — 87086 URINE CULTURE/COLONY COUNT: CPT

## 2023-06-14 PROCEDURE — 83036 HEMOGLOBIN GLYCOSYLATED A1C: CPT

## 2023-06-14 PROCEDURE — 99238 HOSP IP/OBS DSCHRG MGMT 30/<: CPT | Mod: GC

## 2023-06-14 PROCEDURE — 83540 ASSAY OF IRON: CPT

## 2023-06-14 PROCEDURE — 36558 INSERT TUNNELED CV CATH: CPT

## 2023-06-14 PROCEDURE — 86480 TB TEST CELL IMMUN MEASURE: CPT

## 2023-06-14 PROCEDURE — 86803 HEPATITIS C AB TEST: CPT

## 2023-06-14 PROCEDURE — 80076 HEPATIC FUNCTION PANEL: CPT

## 2023-06-14 PROCEDURE — 97161 PT EVAL LOW COMPLEX 20 MIN: CPT

## 2023-06-14 PROCEDURE — 82728 ASSAY OF FERRITIN: CPT

## 2023-06-14 PROCEDURE — 96374 THER/PROPH/DIAG INJ IV PUSH: CPT

## 2023-06-14 PROCEDURE — P9016: CPT

## 2023-06-14 PROCEDURE — 86900 BLOOD TYPING SEROLOGIC ABO: CPT

## 2023-06-14 PROCEDURE — 94640 AIRWAY INHALATION TREATMENT: CPT

## 2023-06-14 PROCEDURE — 85049 AUTOMATED PLATELET COUNT: CPT

## 2023-06-14 PROCEDURE — 36430 TRANSFUSION BLD/BLD COMPNT: CPT

## 2023-06-14 PROCEDURE — 86704 HEP B CORE ANTIBODY TOTAL: CPT

## 2023-06-14 PROCEDURE — 80053 COMPREHEN METABOLIC PANEL: CPT

## 2023-06-14 PROCEDURE — 83010 ASSAY OF HAPTOGLOBIN QUANT: CPT

## 2023-06-14 PROCEDURE — 99291 CRITICAL CARE FIRST HOUR: CPT

## 2023-06-14 PROCEDURE — 84443 ASSAY THYROID STIM HORMONE: CPT

## 2023-06-14 PROCEDURE — 71045 X-RAY EXAM CHEST 1 VIEW: CPT

## 2023-06-14 PROCEDURE — 86706 HEP B SURFACE ANTIBODY: CPT

## 2023-06-14 PROCEDURE — 83970 ASSAY OF PARATHORMONE: CPT

## 2023-06-14 PROCEDURE — 84100 ASSAY OF PHOSPHORUS: CPT

## 2023-06-14 PROCEDURE — 99222 1ST HOSP IP/OBS MODERATE 55: CPT

## 2023-06-14 PROCEDURE — 85025 COMPLETE CBC W/AUTO DIFF WBC: CPT

## 2023-06-14 PROCEDURE — 90935 HEMODIALYSIS ONE EVALUATION: CPT

## 2023-06-14 PROCEDURE — 93970 EXTREMITY STUDY: CPT

## 2023-06-14 PROCEDURE — 82306 VITAMIN D 25 HYDROXY: CPT

## 2023-06-14 PROCEDURE — 85027 COMPLETE CBC AUTOMATED: CPT

## 2023-06-14 PROCEDURE — 86850 RBC ANTIBODY SCREEN: CPT

## 2023-06-14 PROCEDURE — 83735 ASSAY OF MAGNESIUM: CPT

## 2023-06-14 PROCEDURE — 82310 ASSAY OF CALCIUM: CPT

## 2023-06-14 PROCEDURE — 85610 PROTHROMBIN TIME: CPT

## 2023-06-14 PROCEDURE — 85045 AUTOMATED RETICULOCYTE COUNT: CPT

## 2023-06-14 PROCEDURE — 84702 CHORIONIC GONADOTROPIN TEST: CPT

## 2023-06-14 PROCEDURE — 86901 BLOOD TYPING SEROLOGIC RH(D): CPT

## 2023-06-14 PROCEDURE — 83880 ASSAY OF NATRIURETIC PEPTIDE: CPT

## 2023-06-14 PROCEDURE — 80061 LIPID PANEL: CPT

## 2023-06-14 PROCEDURE — 77001 FLUOROGUIDE FOR VEIN DEVICE: CPT

## 2023-06-14 PROCEDURE — 85007 BL SMEAR W/DIFF WBC COUNT: CPT

## 2023-06-14 RX ORDER — ERYTHROPOIETIN 10000 [IU]/ML
10000 INJECTION, SOLUTION INTRAVENOUS; SUBCUTANEOUS ONCE
Refills: 0 | Status: COMPLETED | OUTPATIENT
Start: 2023-06-14 | End: 2023-06-14

## 2023-06-14 RX ADMIN — Medication 1334 MILLIGRAM(S): at 13:42

## 2023-06-14 RX ADMIN — Medication 650 MILLIGRAM(S): at 13:42

## 2023-06-14 RX ADMIN — Medication 325 MILLIGRAM(S): at 13:41

## 2023-06-14 RX ADMIN — AMLODIPINE BESYLATE 10 MILLIGRAM(S): 2.5 TABLET ORAL at 06:23

## 2023-06-14 RX ADMIN — LISINOPRIL 10 MILLIGRAM(S): 2.5 TABLET ORAL at 06:24

## 2023-06-14 RX ADMIN — Medication 1334 MILLIGRAM(S): at 07:15

## 2023-06-14 RX ADMIN — Medication 650 MILLIGRAM(S): at 14:40

## 2023-06-14 RX ADMIN — BUDESONIDE AND FORMOTEROL FUMARATE DIHYDRATE 2 PUFF(S): 160; 4.5 AEROSOL RESPIRATORY (INHALATION) at 06:24

## 2023-06-14 RX ADMIN — ERYTHROPOIETIN 10000 UNIT(S): 10000 INJECTION, SOLUTION INTRAVENOUS; SUBCUTANEOUS at 10:17

## 2023-06-14 NOTE — DISCHARGE NOTE NURSING/CASE MANAGEMENT/SOCIAL WORK - NSDCPEFALRISK_GEN_ALL_CORE
For information on Fall & Injury Prevention, visit: [email or web address] OR  [email or web address] OR  https://www.cdc.gov/steadi/patient.html

## 2023-06-14 NOTE — DISCHARGE NOTE NURSING/CASE MANAGEMENT/SOCIAL WORK - NSDCFUADDAPPT_GEN_ALL_CORE_FT
Initiation of outpatient dialysis, start date Friday, 6/14:    [facility]  Phone: [phone] [address] [place] [address]    - Patient to report to HD clinic by 9:45 on 6/14 to complete intake  - SW notified patient that she will need to set up own transport - father will drive her to/from HD appointments   Initiation of outpatient dialysis, start date Friday, 6/16:    [facility]  Phone: [phone] [address] [place] [address]    - Patient to report to HD clinic by 9:45 on 6/14 to complete intake  - SW notified patient that she will need to set up own transport - father will drive her to/from HD appointments

## 2023-06-14 NOTE — PROGRESS NOTE ADULT - ATTENDING COMMENTS
Thrombocytopenia likely due to dialyzer-reaction from Optiflux dialyzer membrane or perhaps sterilization technique (ex. ebeam). Hb and Plts have stabilized post switch fo dialysis yesterday with Revaclear dialyzer. d/w pt and will d/w outpt HD unit to avoid Optiflux for her.   No evidence of hemolysis.   Stressed importance of her going to transplant center asap on discharge with her mother to be evaluated for mom donating her a kidney.

## 2023-06-14 NOTE — PROGRESS NOTE ADULT - SUBJECTIVE AND OBJECTIVE BOX
Patient is a 37y Female seen and evaluated at bedside. no acute events overnight patient found to have severe MR on TTE pending structural heart eval then likely DC home- platelets stable at 89 Hgb 7.9 K 4.2 bicarb 27       Meds:    acetaminophen     Tablet .. 650 every 6 hours PRN  amLODIPine   Tablet 10 daily  budesonide 160 MICROgram(s)/formoterol 4.5 MICROgram(s) Inhaler 2 two times a day  calcium acetate 1334 three times a day with meals  carvedilol 12.5 every 12 hours  epoetin liz-epbx (RETACRIT) Injectable 27424 once  ferrous    sulfate 325 daily  levalbuterol Inhalation 0.31 every 6 hours PRN  lisinopril 10 daily  melatonin 3 at bedtime PRN  ondansetron Injectable 4 every 8 hours PRN      T(C): , Max: 37.2 (06-14-23 @ 01:19)  T(F): , Max: 99 (06-14-23 @ 01:19)  HR: 103 (06-14-23 @ 09:00)  BP: 137/92 (06-14-23 @ 09:00)  BP(mean): 107 (06-14-23 @ 04:29)  RR: 17 (06-14-23 @ 09:00)  SpO2: 97% (06-14-23 @ 09:00)  Wt(kg): --    06-13 @ 07:01  -  06-14 @ 07:00  --------------------------------------------------------  IN: 0 mL / OUT: 0 mL / NET: 0 mL          Review of Systems:  all other ROS negative       PHYSICAL EXAM:  GENERAL: NAD resting in bed   CHEST/LUNG: Clear to auscultation bilaterally  HEART: normal S1S2, RRR  ABDOMEN: Soft, Nontender, +BS,   EXTREMITIES: No clubbing, cyanosis, or edema   ACCESS: TDC       LABS:                        7.9    6.19  )-----------( 89       ( 14 Jun 2023 05:30 )             25.1     06-14    140  |  102  |  33<H>  ----------------------------<  83  4.2   |  27  |  8.18<H>    Ca    8.4      14 Jun 2023 05:30  Phos  3.3     06-14  Mg     1.8     06-14    TPro  5.5<L>  /  Alb  3.5  /  TBili  0.2  /  DBili  x   /  AST  9<L>  /  ALT  8<L>  /  AlkPhos  55  06-14      PT/INR - ( 14 Jun 2023 05:30 )   PT: 12.2 sec;   INR: 1.02          PTT - ( 14 Jun 2023 05:30 )  PTT:26.1 sec          RADIOLOGY & ADDITIONAL STUDIES:

## 2023-06-14 NOTE — CONSULT NOTE ADULT - ASSESSMENT
Assesment:    37F PMH of HTN, asthma, CKD Stage 4 not on HD (baseline Cr 2.92), PSH lap paloma, , bariatric sleeve gastrectomy (2019) presents from outpatient PCP for worsening renal function, admitted for HD initiation, on telemetry for hypocalcemia w/ EKG changes. Structural heart team consulted for finding of moderate mitral regurg.         Plan:  Problem 1: Moderate MR  -TTE  reveals moderate MR  ***INCOMPLETE NOTE***      Problem 2: ESRD  -dialysis initiated this admission  -care per primary team and nephrology      Problem 3: HTN  -c/w home meds-lisinopril, coreg, norvasc  -care per primary team      Problem 4:asthma  -c/w home meds-symbicort, levalbouterol tartrate  -care per primary team    I have reviewed clinical labs tests and reports, radiology tests and reports, as well as old patient medical records, and discussed with the refering physician.     Assesment:    37F PMH of HTN, asthma, CKD Stage 4 not on HD (baseline Cr 2.92), PSH lap paloma, , bariatric sleeve gastrectomy (2019) presents from outpatient PCP for worsening renal function, admitted for HD initiation, on telemetry for hypocalcemia w/ EKG changes. Structural heart team consulted for finding of moderate mitral regurg.         Plan:  Problem 1: Moderate MR  -TTE  reveals moderate MR  -PT can f/u outpt with Dr. Nieto      Problem 2: ESRD  -dialysis initiated this admission  -care per primary team and nephrology      Problem 3: HTN  -c/w home meds-lisinopril, coreg, norvasc  -care per primary team      Problem 4:asthma  -c/w home meds-symbicort, levalbouterol tartrate  -care per primary team    I have reviewed clinical labs tests and reports, radiology tests and reports, as well as old patient medical records, and discussed with the refering physician.

## 2023-06-14 NOTE — CONSULT NOTE ADULT - SUBJECTIVE AND OBJECTIVE BOX
Surgeon/Attending MD: Gerson    Requesting Physician: Marco    HISTORY OF PRESENT ILLNESS (Need 4):    37F PMH of HTN, asthma, CKD Stage 4 not on HD (baseline Cr 2.92), PSH lap paloma, , bariatric sleeve gastrectomy (2019) presents from outpatient PCP for abnormal labs. Patient states that she went to her PCP (Dr. Garcia) who stated she had abnormal labs including uremia, and she was told to come to the ED for dialysis initiation. Patient had not seen her PCP for over 2 years prior to this visit due to issues with insurance. Pt also states that for the last four months she has been experiencing worsening fatigue as well as intermittent nausea/vomiting. Her fatigue is worse when walking up/down stairs and also endorses dyspnea on exertion. Nausea vomiting occurs about once a week, mostly dry heaving and states food tastes bad in her mouth. No hematemesis. Has not experienced these symptoms before. No lightheadedness, dizziness, syncope, falls. No known history of anemia, unknown baseline Hb. No hematuria/melena/hematochezia. Never had a colonoscopy. Pt currently on her period, has had new history irregular, more frequent periods starting this past September. Periods last 4-5 days, uses about 4 pads a day, regular flow. No family history of polyps or fibroids.     PAST MEDICAL & SURGICAL HISTORY:  Asthma      HTN (hypertension)      Morbid obesity      Nephropathy      History of cholecystectomy      H/O:  section          MEDICATIONS  (STANDING):  amLODIPine   Tablet 10 milliGRAM(s) Oral daily  budesonide 160 MICROgram(s)/formoterol 4.5 MICROgram(s) Inhaler 2 Puff(s) Inhalation two times a day  calcium acetate 1334 milliGRAM(s) Oral three times a day with meals  carvedilol 12.5 milliGRAM(s) Oral every 12 hours  ferrous    sulfate 325 milliGRAM(s) Oral daily  lisinopril 10 milliGRAM(s) Oral daily    MEDICATIONS  (PRN):  acetaminophen     Tablet .. 650 milliGRAM(s) Oral every 6 hours PRN Temp greater or equal to 38C (100.4F), Mild Pain (1 - 3)  levalbuterol Inhalation 0.31 milliGRAM(s) Inhalation every 6 hours PRN shortness of breath  melatonin 3 milliGRAM(s) Oral at bedtime PRN Insomnia  ondansetron Injectable 4 milliGRAM(s) IV Push every 8 hours PRN Nausea and/or Vomiting      Allergies    No Known Allergies    Intolerances        FAMILY HISTORY:      Review of Systems (Need 10):  CONSTITUTIONAL: Denies fevers / chills, sweats, fatigue, weight loss, weight gain                                       NEURO:  Denies parathesias, seizures, syncope, confusion                                                                                  EYES:  Denies blurry vision, discharge, pain, loss of vision                                                                                    ENMT:  Denies difficulty hearing, vertigo, dysphagia, epistaxis, recent dental work                                       CV:  Denies chest pain, palpitations, PIMENTEL, orthopnea                                                                                           RESPIRATORY:  Denies wWheezing, SOB, cough / sputum, hemoptysis                                                               GI:  Denies nausea, vomiting, diarrhea, constipation, melena                                                                          : Denies hematuria, dysuria, urgency, incontinence                                                                                          MUSKULOSKELETAL:  Denies arthritis, joint swelling, muscle weakness                                                             SKIN/BREAST:  Denies rash, itching, hair loss, masses                                                                                              PSYCH:  Denies depression, anxiety, suicidal ideation                                                                                                HEME/LYMPH:  Denies bruises easily, enlarged lymph nodes, tender lymph nodes                                          ENDOCRINE:  Denies cold intolerance, heat intolerance, polydipsia                                                                      Vital Signs Last 24 Hrs  T(C): 37.7 (2023 14:34), Max: 37.7 (2023 14:34)  T(F): 99.9 (2023 14:34), Max: 99.9 (2023 14:34)  HR: 88 (2023 13:10) (75 - 103)  BP: 148/89 (2023 13:10) (129/86 - 158/80)  BP(mean): 114 (2023 13:10) (103 - 114)  RR: 17 (2023 13:10) (17 - 18)  SpO2: 99% (2023 13:10) (95% - 100%)    Parameters below as of 2023 13:10  Patient On (Oxygen Delivery Method): room air        Physical Exam (Need 8)    PHYSICAL EXAM:  General: resting comfortably in bed in NAD  Neurological: AOx3. Motor skills grossly intact  Cardiovascular: Normal S1/S2. Regular rate/rhythm. L sided murmur  Respiratory: Lungs CTA bilaterally. No wheezing or rales  Gastrointestinal: +BS in all 4 quadrants. Non-distended. Soft. Non-tender  Extremities: Strength 5/5 b/l upper/lower extremities. Sensation grossly intact upper/lower extremities. No edema. No calf tenderness.  Vascular: Radial 2+bilaterally, DP 2+ b/l  Incision Sites: NA                                                            LABS:                        7.9    6.19  )-----------( 89       ( 2023 05:30 )             25.1     06-14    140  |  102  |  33<H>  ----------------------------<  83  4.2   |  27  |  8.18<H>    Ca    8.4      2023 05:30  Phos  3.3     06-14  Mg     1.8     06-14    TPro  5.5<L>  /  Alb  3.5  /  TBili  0.2  /  DBili  x   /  AST  9<L>  /  ALT  8<L>  /  AlkPhos  55  06-14    PT/INR - ( 2023 05:30 )   PT: 12.2 sec;   INR: 1.02          PTT - ( 2023 05:30 )  PTT:26.1 sec            RADIOLOGY & ADDITIONAL STUDIES:    < from: TTE Echo Complete w/o Contrast w/ Doppler (23 @ 13:38) >  CONCLUSIONS:     1. Moderately reduced left ventricular systolic function.   2. Normal right ventricular size and systolic function.   3. Mildly dilated left atrium.   4. Moderate mitral regurgitation.   5. Mild mitral stenosis.   6. Pulmonary artery systolic pressure is 42 mmHg.   7. Trivial pericardial effusion.   8. No prior echo is available for comparison.   9. Consider ROBERTO to better visualize the mitral valve and elucidate the   mechanism of mitral pathology, if clinically indicated.    < end of copied text >

## 2023-06-14 NOTE — PROGRESS NOTE ADULT - ASSESSMENT
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, with HD initiation on 6/10     Assessment/Plan:   ESRD   tolerated HD #2 yesterday with 1L UF   K 4.2   -Renal diet      #HTN   BP at goal   continue with amlodipine 10mg Qday  c/w coreg 12.5mg BID  C/w lisinpril 10mg Qday    #access   TDC   hold on  vascular vein mapping at the moment till discussed different dialysis modalities with the patient and potential of living donor transplant    #anemia/thrombocytopenia  Hgb not at goal   please obtain LDH/ haptolgobin/peripheral smear  saad 2/2 dialzyer-> will switch from optiflux to revaclear-> will notify outpatient HD center as well   epo w/ HD    #renal bone disease   Ca ~7.8  Phos: 5.5  c/w calcitriol  1mcg BID  -Started lashell carbonate 1250mg BID  -c/w PhosLo 1334 mg TID w/ meals. Goal phos 3-5.5  -Tele bed  -Vitamin D levels 28.8  -
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, with HD initiation on 6/10, with stable lytes and volume status today, with plan for 2nd HD session 6/12    Assessment/Plan:   ESRD   -2nd HD session 6/12  -Renal diet  -SW consult for HD unit placement    #HTN   BP at goal   continue with amlodipine 10mg Qday  c/w coreg 12.5mg BID  C/w lisinpril 10mg Qday    #access   TDC   hold on  vascular vein mapping at the moment till discussed different dialysis modalities with the patient and potential of living donor transplant    #anemia  Hgb not at goal   Can start on IV iron   epo w/ HD    #renal bone disease   Ca ~7.8  Phos: 5.5  -Increase calcitriol to 1mcg BID  -Started lashell carbonate 1250mg BID  -c/w PhosLo 1334 mg TID w/ meals. Goal phos 3-5.5  -Tele bed  -Vitamin D levels 28.8  -
37F with advanced CKD likely 2/2 secondary FSGS, HTN, Asthma who presents to the ER in setting uremic symptoms requiring new start HD.    Assessment/Plan:   #New Start HD  Pt with known advanced CKD, presenting to the ER in setting of uremic symptoms and sent in by her PMD. Renal failure presumably from secondary FSGS, followed by Dr. Neves (last seen a year ago)  -s/p TDC placement by IR today  -Will arrange HD#1 tomorrow. No urgent indication at the moment as lytes and vol status acceptable. Hypocalcemia management as below  -Renal diet  -SW consult for HD unit placement    #HTN   BP at goal   continue with amlodipine 10mg Qday  c/w coreg 12.5mg BID  C/w lisinpril 10mg Qday    #access   TDC by IR today  Hold off on vascular vein mapping at the moment till discussed different dialysis modalities with the patient and potential of living donor transplant    #anemia  Hgb not at goal  check iron profile and ferritin  epo w/ HD    #renal bone disease   Ca ~6.6  -Increase calcitriol to 1mcg BID  -Started lashell carbonate 1250mg BID  -c/w PhosLo 1334 mg TID w/ meals. Goal phos 3-5.5  -Tele bed  -Vitamin D levels 28.8  -      
37F PMH of HTN, asthma, CKD Stage 4 not on HD (baseline Cr 2.92), PSH lap paloma, , bariatric sleeve gastrectomy (2019) presents from outpatient PCP for worsening renal function, admitted for HD initiation, on telemetry for hypocalcemia w/ EKG changes.
37F PMH of HTN, asthma, CKD Stage 4 not on HD (baseline Cr 2.92), PSH lap paloma, , bariatric sleeve gastrectomy (2019) presents from outpatient PCP for worsening renal function, admitted for HD initiation, on telemetry for hypocalcemia w/ EKG changes.  37F with advanced CKD likely 2/2 secondary FSGS  
37F PMH of HTN, asthma, CKD Stage 4 not on HD (baseline Cr 2.92), PSH lap paloma, , bariatric sleeve gastrectomy (2019) presents from outpatient PCP for worsening renal function, admitted for HD initiation, on telemetry for hypocalcemia w/ EKG changes.
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, with HD initiation on 6/10     Assessment/Plan:   ESRD   HD #2 today  K 4.2   -Renal diet  -SW consult for HD unit placement    #HTN   BP at goal   continue with amlodipine 10mg Qday  c/w coreg 12.5mg BID  C/w lisinpril 10mg Qday    #access   TDC   hold on  vascular vein mapping at the moment till discussed different dialysis modalities with the patient and potential of living donor transplant    #anemia  Hgb not at goal   Can start on IV iron   epo w/ HD    #renal bone disease   Ca ~7.8  Phos: 5.5  c/w calcitriol  1mcg BID  -Started lashell carbonate 1250mg BID  -c/w PhosLo 1334 mg TID w/ meals. Goal phos 3-5.5  -Tele bed  -Vitamin D levels 28.8  -
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, with HD initiation on 6/10     Assessment/Plan:   ESRD   HD today for UF and clearance  using revaclear dialyzer   K 4.2   -Renal diet      #HTN   BP at goal   continue with amlodipine 10mg Qday  c/w coreg 12.5mg BID  C/w lisinpril 10mg Qday    #access   TDC   hold on  vascular vein mapping at the moment till discussed different dialysis modalities with the patient and potential of living donor transplant    #anemia/thrombocytopenia  Hgb not at goal   hemolysis labs normal- peripheral smear pending   saad 2/2 dialzyer->   epo w/ HD    #renal bone disease   Ca 8.4  Phos: 3.3  would lower phos-lo to 667 TID w/ meals     Kristel Plascencia D.O  PGY 5 nephrology fellow  [phone] 
37F PMH of HTN, asthma, CKD Stage 4 not on HD (baseline Cr 2.92), PSH lap paloma, , bariatric sleeve gastrectomy (2019) presents from outpatient PCP for worsening renal function, admitted for HD initiation, on telemetry for hypocalcemia w/ EKG changes.

## 2023-06-14 NOTE — DISCHARGE NOTE NURSING/CASE MANAGEMENT/SOCIAL WORK - PATIENT PORTAL LINK FT
You can access the FollowMyHealth Patient Portal offered by [facility] by registering at the following website: [email or web address]. By joining [facility]’s FollowMyHealth portal, you will also be able to view your health information using other applications (apps) compatible with our system.

## 2023-06-14 NOTE — PROGRESS NOTE ADULT - PROVIDER SPECIALTY LIST ADULT
Nephrology
Internal Medicine
Nephrology
Internal Medicine
2.24
Internal Medicine

## 2023-06-16 LAB
UNFRACTIONATED HEPARIN INTERPRETATION: SIGNIFICANT CHANGE UP
UNFRACTIONATED HEPARIN RESULT: NEGATIVE — SIGNIFICANT CHANGE UP
UNFRACTIONATED HEPARIN-HIGH DOSE: 0 % — SIGNIFICANT CHANGE UP
UNFRACTIONATED HEPARIN-LOW DOSE: 1 % — SIGNIFICANT CHANGE UP

## 2023-06-19 DIAGNOSIS — N18.6 END STAGE RENAL DISEASE: ICD-10-CM

## 2023-06-19 DIAGNOSIS — N17.9 ACUTE KIDNEY FAILURE, UNSPECIFIED: ICD-10-CM

## 2023-06-19 DIAGNOSIS — Z98.84 BARIATRIC SURGERY STATUS: ICD-10-CM

## 2023-06-19 DIAGNOSIS — Z79.899 OTHER LONG TERM (CURRENT) DRUG THERAPY: ICD-10-CM

## 2023-06-19 DIAGNOSIS — I34.0 NONRHEUMATIC MITRAL (VALVE) INSUFFICIENCY: ICD-10-CM

## 2023-06-19 DIAGNOSIS — E21.3 HYPERPARATHYROIDISM, UNSPECIFIED: ICD-10-CM

## 2023-06-19 DIAGNOSIS — R19.7 DIARRHEA, UNSPECIFIED: ICD-10-CM

## 2023-06-19 DIAGNOSIS — R79.89 OTHER SPECIFIED ABNORMAL FINDINGS OF BLOOD CHEMISTRY: ICD-10-CM

## 2023-06-19 DIAGNOSIS — I47.1 SUPRAVENTRICULAR TACHYCARDIA: ICD-10-CM

## 2023-06-19 DIAGNOSIS — I50.22 CHRONIC SYSTOLIC (CONGESTIVE) HEART FAILURE: ICD-10-CM

## 2023-06-19 DIAGNOSIS — Z99.2 DEPENDENCE ON RENAL DIALYSIS: ICD-10-CM

## 2023-06-19 DIAGNOSIS — R94.31 ABNORMAL ELECTROCARDIOGRAM [ECG] [EKG]: ICD-10-CM

## 2023-06-19 DIAGNOSIS — I13.2 HYPERTENSIVE HEART AND CHRONIC KIDNEY DISEASE WITH HEART FAILURE AND WITH STAGE 5 CHRONIC KIDNEY DISEASE, OR END STAGE RENAL DISEASE: ICD-10-CM

## 2023-06-19 DIAGNOSIS — E66.01 MORBID (SEVERE) OBESITY DUE TO EXCESS CALORIES: ICD-10-CM

## 2023-06-19 DIAGNOSIS — J45.909 UNSPECIFIED ASTHMA, UNCOMPLICATED: ICD-10-CM

## 2023-06-19 DIAGNOSIS — D63.1 ANEMIA IN CHRONIC KIDNEY DISEASE: ICD-10-CM

## 2023-06-19 DIAGNOSIS — E88.09 OTHER DISORDERS OF PLASMA-PROTEIN METABOLISM, NOT ELSEWHERE CLASSIFIED: ICD-10-CM

## 2023-06-19 DIAGNOSIS — D69.6 THROMBOCYTOPENIA, UNSPECIFIED: ICD-10-CM

## 2023-12-11 NOTE — ED ADULT TRIAGE NOTE - GLASGOW COMA SCALE: SCORE, MLM
[facility]    Provider, No Known    CHIEF COMPLAINT:     Abdominal Pain     HISTORY OF PRESENT ILLNESS:    Abdominal Pain        ED-12/09/2023  History of present is 42-year-old male sudden onset of left abdominal pain started 45 minutes ago.  Patient was at home just sitting doing nothing in particular.  Nothing seem to trigger it.  Severe he has nausea and vomiting associated with it.  It is nonradiating.  He denies any dizziness or syncope no fever chills sweats no urinary problems no testicular pain or swelling no one home with similar illness.  No recent long car ride plane or immobilization foreign travels no leg pain or swelling chest pain neck arm jaw pain or shortness of breath      Observation-12/09/2023  Patient agrees with HPI noted above including left side flank pain that started earlier today.  He reports history of kidney stones once in the past.  Currently rates pain 4/10 relieved by morphine.  Had some nausea and vomiting earlier due to pain.    Observation-12/11/2023  Patient feeling much better. Denies any abdominal pain, nausea or vomiting. Has been able to pass kidney stone and is eager for discharge.     History reviewed. No pertinent past medical history.  Past Surgical History:   Procedure Laterality Date    DENTAL PROCEDURE      FRACTURE SURGERY      JOINT REPLACEMENT      KNEE SURGERY Left 2023     History reviewed. No pertinent family history.  Social History     Tobacco Use    Smoking status: Never     Passive exposure: Never    Smokeless tobacco: Never   Vaping Use    Vaping Use: Never used   Substance Use Topics    Alcohol use: Yes     Comment: SOCIALLY    Drug use: Never     Medications Prior to Admission   Medication Sig Dispense Refill Last Dose    Cholecalciferol 25 MCG (1000 UT) tablet Take 1 tablet by mouth Daily.       coenzyme Q10 100 MG capsule Take 1 capsule by mouth Daily.        Allergies:  Patient has no known allergies.      There is no  immunization history on file for this patient.        REVIEW OF SYSTEMS:    Review of Systems   Gastrointestinal:  Positive for abdominal pain.     Constitutional: Negative for chills and fever.   Gastrointestinal:  Positive for abdominal pain, nausea and vomiting.   Genitourinary:  Positive for flank pain. Negative for dysuria and hematuria.   All other systems reviewed and are negative.    Vital Signs  Temp:  [97.5 °F (36.4 °C)-98.5 °F (36.9 °C)] 97.6 °F (36.4 °C)  Heart Rate:  [58-71] 71  Resp:  [10-18] 18  BP: ()/(44-87) 100/44          Physical Exam:  Physical Exam  Vitals and nursing note reviewed.   Constitutional:       Appearance: Normal appearance.   HENT:      Head: Normocephalic and atraumatic.      Right Ear: External ear normal.      Left Ear: External ear normal.      Nose: Nose normal.      Mouth/Throat:      Mouth: Mucous membranes are moist.   Eyes:      Extraocular Movements: Extraocular movements intact.   Cardiovascular:      Rate and Rhythm: Normal rate and regular rhythm.      Pulses: Normal pulses.      Heart sounds: Normal heart sounds.   Pulmonary:      Effort: Pulmonary effort is normal.      Breath sounds: Normal breath sounds.   Abdominal:      General: Abdomen is flat. Bowel sounds are normal.      Palpations: Abdomen is soft.   Musculoskeletal:         General: Normal range of motion.      Cervical back: Normal range of motion.   Skin:     General: Skin is warm.   Neurological:      Mental Status: He is alert and oriented to person, place, and time.   Psychiatric:         Behavior: Behavior normal.         Thought Content: Thought content normal.         Judgment: Judgment normal.         Emotional Behavior:    Normal   Debilities:   None  Results Review:    I reviewed the patient's new clinical results.  Lab Results (most recent)       Procedure Component Value Units Date/Time    STONE ANALYSIS - Calculus, Voided [[id number]] Collected: 12/11/23 0646    Specimen: Calculus from  Voided Updated: 12/11/23 0706    Basic Metabolic Panel [[id number]]  (Abnormal) Collected: 12/11/23 0333    Specimen: Blood from Arm, Right Updated: 12/11/23 0458     Glucose 93 mg/dL      BUN 15 mg/dL      Creatinine 1.01 mg/dL      Sodium 143 mmol/L      Potassium 4.0 mmol/L      Chloride 109 mmol/L      CO2 26.0 mmol/L      Calcium 8.5 mg/dL      BUN/Creatinine Ratio 14.9     Anion Gap 8.0 mmol/L      eGFR 95.2 mL/min/1.73     Narrative:      GFR Normal >60  Chronic Kidney Disease <60  Kidney Failure <15      CBC & Differential [[id number]]  (Normal) Collected: 12/11/23 0333    Specimen: Blood from Arm, Right Updated: 12/11/23 0439    Narrative:      The following orders were created for panel order CBC & Differential.  Procedure                               Abnormality         Status                     ---------                               -----------         ------                     CBC Auto Differential[[id number]]        Normal              Final result                 Please view results for these tests on the individual orders.    CBC Auto Differential [[id number]]  (Normal) Collected: 12/11/23 0333    Specimen: Blood from Arm, Right Updated: 12/11/23 0439     WBC 5.10 10*3/mm3      RBC 4.50 10*6/mm3      Hemoglobin 14.0 g/dL      Hematocrit 41.4 %      MCV 92.0 fL      MCH 31.1 pg      MCHC 33.8 g/dL      RDW 12.6 %      RDW-SD 40.3 fl      MPV 9.0 fL      Platelets 177 10*3/mm3      Neutrophil % 48.2 %      Lymphocyte % 36.1 %      Monocyte % 9.2 %      Eosinophil % 6.0 %      Basophil % 0.5 %      Neutrophils, Absolute 2.50 10*3/mm3      Lymphocytes, Absolute 1.80 10*3/mm3      Monocytes, Absolute 0.50 10*3/mm3      Eosinophils, Absolute 0.30 10*3/mm3      Basophils, Absolute 0.00 10*3/mm3      nRBC 0.1 /100 WBC     Basic Metabolic Panel [[id number]]  (Abnormal) Collected: 12/10/23 0546    Specimen: Blood Updated: 12/10/23 0721     Glucose 126 mg/dL      BUN 21 mg/dL      Creatinine 1.36 mg/dL       Sodium 141 mmol/L      Potassium 4.1 mmol/L      Chloride 107 mmol/L      CO2 26.0 mmol/L      Calcium 8.4 mg/dL      BUN/Creatinine Ratio 15.4     Anion Gap 8.0 mmol/L      eGFR 66.6 mL/min/1.73     Narrative:      GFR Normal >60  Chronic Kidney Disease <60  Kidney Failure <15      CBC & Differential [[id number]]  (Abnormal) Collected: 12/10/23 0546    Specimen: Blood Updated: 12/10/23 0701    Narrative:      The following orders were created for panel order CBC & Differential.  Procedure                               Abnormality         Status                     ---------                               -----------         ------                     CBC Auto Differential[[id number]]        Abnormal            Final result                 Please view results for these tests on the individual orders.    CBC Auto Differential [[id number]]  (Abnormal) Collected: 12/10/23 0546    Specimen: Blood Updated: 12/10/23 0701     WBC 8.40 10*3/mm3      RBC 4.80 10*6/mm3      Hemoglobin 14.6 g/dL      Hematocrit 43.2 %      MCV 90.0 fL      MCH 30.4 pg      MCHC 33.8 g/dL      RDW 12.5 %      RDW-SD 41.6 fl      MPV 9.1 fL      Platelets 173 10*3/mm3      Neutrophil % 75.3 %      Lymphocyte % 14.3 %      Monocyte % 7.8 %      Eosinophil % 2.3 %      Basophil % 0.3 %      Neutrophils, Absolute 6.30 10*3/mm3      Lymphocytes, Absolute 1.20 10*3/mm3      Monocytes, Absolute 0.70 10*3/mm3      Eosinophils, Absolute 0.20 10*3/mm3      Basophils, Absolute 0.00 10*3/mm3      nRBC 0.0 /100 WBC     Urinalysis With Microscopic If Indicated (No Culture) - Urine, Clean Catch [[id number]]  (Abnormal) Collected: 12/09/23 1338    Specimen: Urine, Clean Catch Updated: 12/09/23 1355     Color, UA Yellow     Appearance, UA Clear     pH, UA 5.5     Specific Gravity, UA 1.026     Glucose, UA Negative     Ketones, UA 15 mg/dL (1+)     Bilirubin, UA Negative     Blood, UA Negative     Protein, UA Trace     Leuk Esterase, UA Negative     Nitrite, UA  Negative     Urobilinogen, UA 1.0 E.U./dL    Narrative:      Urine microscopic not indicated.    Comprehensive Metabolic Panel [[id number]]  (Abnormal) Collected: 12/09/23 1230    Specimen: Blood Updated: 12/09/23 1305     Glucose 116 mg/dL      BUN 19 mg/dL      Creatinine 1.28 mg/dL      Sodium 143 mmol/L      Potassium 3.8 mmol/L      Chloride 106 mmol/L      CO2 24.0 mmol/L      Calcium 9.4 mg/dL      Total Protein 7.4 g/dL      Albumin 4.5 g/dL      ALT (SGPT) 40 U/L      AST (SGOT) 26 U/L      Alkaline Phosphatase 78 U/L      Total Bilirubin 0.7 mg/dL      Globulin 2.9 gm/dL      A/G Ratio 1.6 g/dL      BUN/Creatinine Ratio 14.8     Anion Gap 13.0 mmol/L      eGFR 71.7 mL/min/1.73     Narrative:      GFR Normal >60  Chronic Kidney Disease <60  Kidney Failure <15      Lipase [[id number]]  (Normal) Collected: 12/09/23 1230    Specimen: Blood Updated: 12/09/23 1305     Lipase 28 U/L             Imaging Results (Most Recent)       Procedure Component Value Units Date/Time    CT Kidneys With & Without Contrast [[id number]] Collected: 12/11/23 0824     Updated: 12/11/23 0833    Narrative:        CT KIDNEYS W WO CONTRAST    Date of Exam: 12/11/2023 8:04 AM EST    Indication: Left renal mass versus cyst.    Comparison: CT abdomen pelvis without contrast 12/11/2023 and 12/9/2023.    Technique: Axial CT images were obtained of the abdomen before and after the uneventful intravenous administration of iodinated contrast. Sagittal and coronal reconstructions were performed.  Automated exposure control and iterative reconstruction   methods were used.      Findings:  A benign hemorrhagic or proteinaceous 6 measuring 9 mm projects exophytically from the left lower renal pole at its lateral margin (noncontrast Hounsfield units 26, postcontrast Hounsfield units 27). No suspicious enhancing renal masses identified.    There is mild left renal cortical thinning or scarring anteriorly at the interpolar region. A 4 mm nonobstructing  stone is seen at the left mid kidney. No ureteral stone is identified. No suspicious filling defects are seen within the renal collecting   systems or the imaged portions of the ureters. There is minimal residual left hydronephrosis.    Two 5-6mm low-density hepatic lesions are favored to represent small cysts. The spleen, pancreas, adrenals are normal.    The stomach, and the imaged portions of the large and small bowel, do not appear abnormally thickened, dilated or inflamed. The appendix is normal. The abdominal aorta is of normal caliber. No pathologically enlarged lymph nodes are identified.    Lung bases are clear. Benign calcified nodule is present in the left lower lobe of the lung. No acute basilar airspace disease. Normal heart size.    No acute or suspicious osseous abnormalities are identified.      Impression:      1. Benign 9 mm hemorrhagic or proteinaceous cyst at the left lower renal pole. No suspicious enhancing renal masses.  2. 4 mm nonobstructing left renal stone.  3. Minimal residual left hydronephrosis.    Electronically Signed: Suki Cantor MD    12/11/2023 8:31 AM EST    Workstation ID: [id number]    XR Abdomen KUB [[id number]] Collected: 12/11/23 0823     Updated: 12/11/23 0827    Narrative:      XR ABDOMEN KUB    Date of Exam: 12/11/2023 7:53 AM EST    Indication: Renal calculus    Comparison: None available.    Findings: 2 supine views.  There is a 5 mm left mid pole renal stone. No right renal stones are present. No ureteral stones are identified. The bowel gas pattern is normal.      Impression:      Impression:  Left nephrolithiasis.      Electronically Signed: Kelle Martínez MD    12/11/2023 8:25 AM EST    Workstation ID: [id number]    CT Abdomen Pelvis Without Contrast [[id number]] Collected: 12/11/23 0057     Updated: 12/11/23 0104    Narrative:      CT ABDOMEN PELVIS WO CONTRAST    Date of Exam: 12/11/2023 12:40 AM EST    Indication: Flank pain, kidney stone suspected  Evaluate for  Ureteral Stone.    Comparison: 12/9/2023.    Technique: Axial CT images were obtained of the abdomen and pelvis without the administration of contrast. Sagittal and coronal reconstructions were performed.  Automated exposure control and iterative reconstruction methods were used.      Findings:  There are calcified left basilar granulomas and mild dependent bibasilar atelectasis. The heart size is normal. Distal esophagus is unremarkable. There are no acute findings in the superficial soft tissues. There are no acute osseous abnormalities or   destructive bone lesions. There are no significant osseous degenerative changes.    There is a subcentimeter cyst in liver segment 8. The gallbladder is contracted. The bile ducts, pancreas, stomach, spleen and adrenal glands appear within normal limits. There is a 9 mm exophytic intermediate density lesion at the inferior pole of the   left kidney. Right kidney parenchyma is normal. There is a 4 mm nonobstructing stone in the mid left kidney. Previously seen left UPJ stone has passed and is no longer seen in the urinary tract. There is mild residual left-sided hydronephrosis and   asymmetric left-sided perinephric stranding. There is no right-sided hydronephrosis. The prostate gland and urinary bladder appear within normal limits. There is a small closed urachal remnant. The appendix is normal. There is mild distal colonic   diverticulosis. Small bowel is nondistended. Aorta is normal diameter. No ascites, pneumoperitoneum or lymphadenopathy.      Impression:      Impression:  1.Previously seen left UPJ stone has passed and is no longer seen in the urinary tract. There is mild residual left-sided hydronephrosis and asymmetric left-sided perinephric stranding.  2.4 mm nonobstructing left-sided kidney stone.  3.A 9 mm exophytic intermediate density lesion at the inferior pole of the left kidney. This is likely a small hemorrhagic or proteinaceous cyst.        Electronically  Signed: Mathew Aviles MD    12/11/2023 1:02 AM EST    Workstation ID: [id number]    CT Abdomen Pelvis Stone Protocol [[id number]] Collected: 12/09/23 1250     Updated: 12/09/23 1255    Narrative:      CT ABDOMEN PELVIS STONE PROTOCOL    Date of Exam: 12/9/2023 11:38 AM CST    Indication: Left-sided flank and groin pain sudden onset.    Comparison: None available.    Technique: Axial CT images were obtained of the abdomen and pelvis without the administration of contrast. Sagittal and coronal reconstructions were performed.  Automated exposure control and iterative reconstruction methods were used.      Findings:  LUNG BASES:  Unremarkable without mass or infiltrate.    LIVER:  Unremarkable parenchyma without focal lesion.  BILIARY/GALLBLADDER:  Unremarkable  SPLEEN:  Unremarkable  PANCREAS:  Unremarkable  ADRENAL:  Unremarkable  KIDNEYS: There is a 4 mm calculus at the level of the left ureteropelvic junction (image 65, series 3) with mild to moderate left hydronephrosis. There is an additional nonobstructive 5 mm calculus within the mid left kidney. There is a 9 mm exophytic   cyst arising from the lower pole of the left kidney.  GASTROINTESTINAL/MESENTERY:  No evidence of obstruction nor inflammation.  The appendix is normal.  AORTA/IVC:  Normal caliber.    RETROPERITONEUM/LYMPH NODES:  Unremarkable    REPRODUCTIVE:  Unremarkable  BLADDER:  Unremarkable    OSSEUS STRUCTURES:  Typical for age with no acute process identified.      Impression:      Impression:  1.There is a 4 mm left UPJ calculus with mild to moderate left hydronephrosis. There is an additional nonobstructive left renal calculus.            Electronically Signed: Con Back MD    12/9/2023 11:53 AM CST    Workstation ID: [id number]          reviewed    ECG/EMG Results (most recent)       Procedure Component Value Units Date/Time    SCANNED - TELEMETRY   [[id number]] Resulted: 12/09/23     Updated: 12/11/23 0246    SCANNED - TELEMETRY   [[id number]]  Resulted: 12/09/23     Updated: 12/11/23 0938          reviewed    Results for orders placed during the hospital encounter of 04/14/23    Duplex Venous Lower Extremity - Left CAR    Interpretation Summary    Normal left lower extremity venous duplex scan.          Microbiology Results (last 10 days)       ** No results found for the last 240 hours. **            Assessment & Plan     * No active hospital problems. *       Ureterolithiasis            Lab Results   Component Value Date     WBC 5.50 12/09/2023     AST 26 12/09/2023     ALT 40 12/09/2023     ALKPHOS 78 12/09/2023     BILITOT 0.7 12/09/2023     LIPASE 28 12/09/2023   -CMP showed creatinine 1.28, 1.36. IVF given and Cr wnr at discharge  -CBC and lipase unremarkable  -UA generally unremarkable  -CT of abdomen and pelvis: Showed 4 mm left UPJ calculus with mild to moderate left hydronephrosis  -In the ED patient given ketorolac 500 mL LR bolus, Reglan, morphine, Zofran  -Urology consulted and recommends flomax, dc toradol  - pt states he was able to pass the stone  - Patient monitored and denies any acute distress or complaints.   -Urology recommended discharge today after KUB and CT kidneys. F/u with urology in 1 week  -CT kidney showed benign 9 mm hemorrhagic cyst at the left lower pole and 4 mm nonobstructing left renal stone  -KUB showed left nephrolithiasis          I discussed the patients findings and my recommendations with patient and nursing staff.     Discharge Diagnosis:      * No active hospital problems. *      Hospital Course  Patient is a 42 y.o. male presented with kidney stones as noted in HPI above.  Creatinine mildly elevated and patient received IV fluids during admission.  He was kept in the observation unit for urology consulted but was able to pass a stone on his own.  Patient also had a CT kidneys which showed a benign cyst and nonobstructing left renal stone.  KUB showed left nephro lithiasis and urologist cleared patient for  discharge with recommendations to follow-up outpatient in 1 week. At this time, patient felt to be in good condition for discharge with close follow up with PCP. Instructed to take all medications as prescribed and to return to ED if any concerning signs/symptoms. All test/lab results were discussed with patient. All questions were answered and patient verbalizes understanding.   .      Past Medical History:   History reviewed. No pertinent past medical history.    Past Surgical History:     Past Surgical History:   Procedure Laterality Date    DENTAL PROCEDURE      FRACTURE SURGERY      JOINT REPLACEMENT      KNEE SURGERY Left 2023       Social History:   Social History     Socioeconomic History    Marital status:    Tobacco Use    Smoking status: Never     Passive exposure: Never    Smokeless tobacco: Never   Vaping Use    Vaping Use: Never used   Substance and Sexual Activity    Alcohol use: Yes     Comment: SOCIALLY    Drug use: Never    Sexual activity: Not Currently       Procedures Performed         Consults:   Consults       Date and Time Order Name Status Description    12/9/2023  2:47 PM Urology (on-call MD unless specified)              Condition on Discharge:     Stable    Discharge Disposition  Home or Self Care    Discharge Medications     Discharge Medications        Continue These Medications        Instructions Start Date   cholecalciferol 25 MCG (1000 UT) tablet  Commonly known as: VITAMIN D3   1,000 Units, Oral, Daily      coenzyme Q10 100 MG capsule   100 mg, Oral, Daily               Discharge Diet:   Diet Instructions       Diet: Gastrointestinal Diets; Gluten-Sensitive; Regular Texture (IDDSI 7); Thin (IDDSI 0)      Discharge Diet: Gastrointestinal Diets    Gastrointestinal Diet: Gluten-Sensitive    Texture: Regular Texture (IDDSI 7)    Fluid Consistency: Thin (IDDSI 0)            Activity at Discharge:     Follow-up Appointments  Future Appointments   Date Time Provider Department  [facility]   12/14/2023  3:30 PM [facility] CT [facility]     Additional Instructions for the Follow-ups that You Need to Schedule       Discharge Follow-up with PCP   As directed       Currently Documented PCP:    Provider, No Known    PCP Phone Number:    None     Follow Up Details: 5-7 days        Discharge Follow-up with Specified Provider: Dr Sorto; 1 Week   As directed      To: Dr Sorto   Follow Up: 1 Week                Test Results Pending at Discharge  Pending Labs       Order Current Status    STONE ANALYSIS - Calculus, Voided In process             Risk for Readmission (LACE) Score: 2 (12/11/2023  6:00 AM)      Greater than 30 minutes spent in discharge activities for this patient    ALMA Rosenbaum  12/11/23  10:18 EST           15

## 2024-01-04 NOTE — PROGRESS NOTE ADULT - PROBLEM SELECTOR PLAN 1
first hemodialysis session today for UF and clearance  two hour session  will use higher Calcium bath in light of hypocalcemia    continue Rocaltrol. Oscal, Phoslo. and NaHCO3  next HD on Monday 6/12 ,
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
## 2025-03-14 NOTE — DIETITIAN INITIAL EVALUATION ADULT - PROBLEM SELECTOR PLAN 2
A1C goal: <7%  Fasting/premeal blood glucose goal:   2 hour post-meal blood glucose goal: less than 180      Reviewed CGM data.   Recommended starting semaglutide or restarting Jardiance/Farxiga for kidney protection. Pt will consider semaglutide at next visit.       He wishes to focus on dietary changes.   Declines another visit with educator at this time.   Return to clinic in 4 months with labs prior with  Continuous Glucose Monitor (CGM) study.     
#New HD initiation  Patient presenting from outpatient PCP provider with elevated Cr and BUN for dialysis initiation. Nephrology consulted. On admission Cr elevated to 16.98 w/ BUN 94. Sxs of fatigue. 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 Neves. Home mediations: bicarb 650 TID, calcitriol 0.25 daily, vit D2 50K units weekly  - f/u nephro recs  - IR for permacath  - vascular consult in AM for vein mapping  - f/u quant   - c/w home bicarb and calcitriol  - hold vit D2 50K inpatient, restart as outpatient.  - renal diet  - social work consult for HD unit